# Patient Record
Sex: FEMALE | Race: WHITE | NOT HISPANIC OR LATINO | Employment: UNEMPLOYED | URBAN - METROPOLITAN AREA
[De-identification: names, ages, dates, MRNs, and addresses within clinical notes are randomized per-mention and may not be internally consistent; named-entity substitution may affect disease eponyms.]

---

## 2018-04-20 ENCOUNTER — TRANSCRIBE ORDERS (OUTPATIENT)
Dept: ADMINISTRATIVE | Facility: HOSPITAL | Age: 48
End: 2018-04-20

## 2018-04-20 DIAGNOSIS — J44.9 CHRONIC OBSTRUCTIVE PULMONARY DISEASE, UNSPECIFIED COPD TYPE (HCC): ICD-10-CM

## 2018-04-20 DIAGNOSIS — R07.9 CHEST PAIN, UNSPECIFIED TYPE: Primary | ICD-10-CM

## 2018-07-19 RX ORDER — FENOPROFEN CALCIUM 200 MG
CAPSULE ORAL EVERY 24 HOURS
COMMUNITY
Start: 2018-04-11 | End: 2018-12-06 | Stop reason: ALTCHOICE

## 2018-07-19 RX ORDER — FEXOFENADINE HCL 180 MG/1
TABLET ORAL EVERY 24 HOURS
COMMUNITY
Start: 2018-04-11 | End: 2018-12-06 | Stop reason: ALTCHOICE

## 2018-07-19 RX ORDER — IPRATROPIUM BROMIDE AND ALBUTEROL SULFATE 2.5; .5 MG/3ML; MG/3ML
3 SOLUTION RESPIRATORY (INHALATION) 4 TIMES DAILY
COMMUNITY
Start: 2017-08-24 | End: 2018-12-06 | Stop reason: ALTCHOICE

## 2018-07-19 RX ORDER — ALBUTEROL SULFATE 90 UG/1
1 AEROSOL, METERED RESPIRATORY (INHALATION) EVERY 6 HOURS
COMMUNITY
Start: 2017-08-24 | End: 2019-01-30 | Stop reason: SDUPTHER

## 2018-07-19 RX ORDER — LORATADINE 10 MG
TABLET ORAL
Refills: 0 | COMMUNITY
Start: 2018-04-11 | End: 2019-03-14 | Stop reason: ALTCHOICE

## 2018-07-19 RX ORDER — PAROXETINE 10 MG/1
TABLET, FILM COATED ORAL EVERY 24 HOURS
COMMUNITY
Start: 2018-04-20 | End: 2018-07-24 | Stop reason: SDUPTHER

## 2018-07-19 RX ORDER — NAPROXEN 500 MG/1
500 TABLET ORAL 2 TIMES DAILY WITH MEALS
Refills: 2 | COMMUNITY
Start: 2018-05-13 | End: 2018-07-24 | Stop reason: SDUPTHER

## 2018-07-19 RX ORDER — OMEPRAZOLE 40 MG/1
40 CAPSULE, DELAYED RELEASE ORAL DAILY
Refills: 5 | COMMUNITY
Start: 2018-05-13 | End: 2018-08-07 | Stop reason: SDUPTHER

## 2018-07-19 RX ORDER — LORAZEPAM 1 MG/1
1 TABLET ORAL EVERY 6 HOURS
COMMUNITY
Start: 2017-08-24 | End: 2018-07-24 | Stop reason: SDUPTHER

## 2018-07-23 DIAGNOSIS — Z72.0 TOBACCO ABUSE DISORDER: Primary | ICD-10-CM

## 2018-07-23 RX ORDER — NICOTINE 7 MG/24H
PATCH, EXTENDED RELEASE TRANSDERMAL
Qty: 28 PATCH | Refills: 0 | Status: SHIPPED | OUTPATIENT
Start: 2018-07-23 | End: 2020-03-13 | Stop reason: SDUPTHER

## 2018-07-23 RX ORDER — NICOTINE 14MG/24HR
PATCH, TRANSDERMAL 24 HOURS TRANSDERMAL
Qty: 28 PATCH | Refills: 0 | Status: SHIPPED | OUTPATIENT
Start: 2018-07-23 | End: 2019-01-10 | Stop reason: SDDI

## 2018-07-24 ENCOUNTER — OFFICE VISIT (OUTPATIENT)
Dept: FAMILY MEDICINE CLINIC | Facility: CLINIC | Age: 48
End: 2018-07-24
Payer: COMMERCIAL

## 2018-07-24 VITALS
RESPIRATION RATE: 16 BRPM | BODY MASS INDEX: 22.25 KG/M2 | TEMPERATURE: 97.6 F | SYSTOLIC BLOOD PRESSURE: 120 MMHG | DIASTOLIC BLOOD PRESSURE: 74 MMHG | WEIGHT: 106 LBS | OXYGEN SATURATION: 99 % | HEART RATE: 74 BPM | HEIGHT: 58 IN

## 2018-07-24 DIAGNOSIS — G89.29 CHRONIC BILATERAL LOW BACK PAIN WITHOUT SCIATICA: ICD-10-CM

## 2018-07-24 DIAGNOSIS — F17.200 TOBACCO DEPENDENCE SYNDROME: ICD-10-CM

## 2018-07-24 DIAGNOSIS — J44.9 COPD (CHRONIC OBSTRUCTIVE PULMONARY DISEASE) WITH CHRONIC BRONCHITIS (HCC): ICD-10-CM

## 2018-07-24 DIAGNOSIS — R53.83 FATIGUE, UNSPECIFIED TYPE: ICD-10-CM

## 2018-07-24 DIAGNOSIS — N95.1 PERIMENOPAUSAL: ICD-10-CM

## 2018-07-24 DIAGNOSIS — R58 ECCHYMOSIS: ICD-10-CM

## 2018-07-24 DIAGNOSIS — M54.50 CHRONIC BILATERAL LOW BACK PAIN WITHOUT SCIATICA: ICD-10-CM

## 2018-07-24 DIAGNOSIS — F41.9 ANXIETY: Primary | ICD-10-CM

## 2018-07-24 DIAGNOSIS — J45.40 UNCONTROLLED MODERATE PERSISTENT ASTHMA: ICD-10-CM

## 2018-07-24 PROBLEM — J45.909 ASTHMA: Status: ACTIVE | Noted: 2017-10-25

## 2018-07-24 PROBLEM — K44.9 GASTROESOPHAGEAL REFLUX DISEASE WITH HIATAL HERNIA: Status: ACTIVE | Noted: 2017-11-22

## 2018-07-24 PROBLEM — K21.9 GASTROESOPHAGEAL REFLUX DISEASE WITH HIATAL HERNIA: Status: ACTIVE | Noted: 2017-11-22

## 2018-07-24 LAB
APTT PPP: 28 SECONDS (ref 24–36)
BASOPHILS # BLD AUTO: 0.01 THOUSANDS/ΜL (ref 0–0.1)
BASOPHILS NFR BLD AUTO: 0 % (ref 0–1)
EOSINOPHIL # BLD AUTO: 0.04 THOUSAND/ΜL (ref 0–0.61)
EOSINOPHIL NFR BLD AUTO: 0 % (ref 0–6)
ERYTHROCYTE [DISTWIDTH] IN BLOOD BY AUTOMATED COUNT: 17.4 % (ref 11.6–15.1)
HCT VFR BLD AUTO: 37.4 % (ref 34.8–46.1)
HGB BLD-MCNC: 11.5 G/DL (ref 11.5–15.4)
IMM GRANULOCYTES # BLD AUTO: 0.04 THOUSAND/UL (ref 0–0.2)
IMM GRANULOCYTES NFR BLD AUTO: 0 % (ref 0–2)
LYMPHOCYTES # BLD AUTO: 1.15 THOUSANDS/ΜL (ref 0.6–4.47)
LYMPHOCYTES NFR BLD AUTO: 12 % (ref 14–44)
MCH RBC QN AUTO: 25.8 PG (ref 26.8–34.3)
MCHC RBC AUTO-ENTMCNC: 30.7 G/DL (ref 31.4–37.4)
MCV RBC AUTO: 84 FL (ref 82–98)
MONOCYTES # BLD AUTO: 0.97 THOUSAND/ΜL (ref 0.17–1.22)
MONOCYTES NFR BLD AUTO: 10 % (ref 4–12)
NEUTROPHILS # BLD AUTO: 7.16 THOUSANDS/ΜL (ref 1.85–7.62)
NEUTS SEG NFR BLD AUTO: 78 % (ref 43–75)
NRBC BLD AUTO-RTO: 0 /100 WBCS
PLATELET # BLD AUTO: 237 THOUSANDS/UL (ref 149–390)
PMV BLD AUTO: 11.7 FL (ref 8.9–12.7)
RBC # BLD AUTO: 4.46 MILLION/UL (ref 3.81–5.12)
TSH SERPL DL<=0.05 MIU/L-ACNC: 1.27 UIU/ML (ref 0.36–3.74)
WBC # BLD AUTO: 9.37 THOUSAND/UL (ref 4.31–10.16)

## 2018-07-24 PROCEDURE — 85730 THROMBOPLASTIN TIME PARTIAL: CPT | Performed by: FAMILY MEDICINE

## 2018-07-24 PROCEDURE — 36415 COLL VENOUS BLD VENIPUNCTURE: CPT | Performed by: FAMILY MEDICINE

## 2018-07-24 PROCEDURE — 99214 OFFICE O/P EST MOD 30 MIN: CPT | Performed by: FAMILY MEDICINE

## 2018-07-24 PROCEDURE — 85025 COMPLETE CBC W/AUTO DIFF WBC: CPT | Performed by: FAMILY MEDICINE

## 2018-07-24 PROCEDURE — 84443 ASSAY THYROID STIM HORMONE: CPT | Performed by: FAMILY MEDICINE

## 2018-07-24 PROCEDURE — 3008F BODY MASS INDEX DOCD: CPT | Performed by: FAMILY MEDICINE

## 2018-07-24 RX ORDER — LORAZEPAM 1 MG/1
1 TABLET ORAL EVERY 6 HOURS
Qty: 30 TABLET | Refills: 0 | Status: CANCELLED | OUTPATIENT
Start: 2018-07-24

## 2018-07-24 RX ORDER — LORAZEPAM 1 MG/1
1 TABLET ORAL DAILY PRN
Qty: 30 TABLET | Refills: 0 | Status: SHIPPED | OUTPATIENT
Start: 2018-07-24 | End: 2018-12-06 | Stop reason: ALTCHOICE

## 2018-07-24 RX ORDER — NAPROXEN 500 MG/1
500 TABLET ORAL 2 TIMES DAILY WITH MEALS
Qty: 60 TABLET | Refills: 1 | Status: SHIPPED | OUTPATIENT
Start: 2018-07-24 | End: 2018-10-22 | Stop reason: SDUPTHER

## 2018-07-24 RX ORDER — PAROXETINE HYDROCHLORIDE 20 MG/1
20 TABLET, FILM COATED ORAL DAILY
Qty: 30 TABLET | Refills: 3 | Status: SHIPPED | OUTPATIENT
Start: 2018-07-24 | End: 2019-02-05

## 2018-07-24 NOTE — PROGRESS NOTES
Assessment/Plan:    No problem-specific Assessment & Plan notes found for this encounter  Plan:  1  Patient was referred to pulmonologist   2  We will increase her Paxil to 20 mg daily  This should help with some recent depression and also her underlying anxiety condition  She will also use Ativan 1 mg as needed when she has significant anxiety or panic symptoms  She is aware that this medication may cause dependency  3  We discussed the importance of smoking cessation and she is going to continue to try to quit  4  In reference to her menopausal symptoms I would like her to see our gynecologist for continued care and management  5  Her CT scan of the abdomen and pelvis was essentially unremarkable except for some old healed left rib fractures and mild diverticulosis of her sigmoid colon  Appropriate diet was discussed which included higher fiber  And to avoid seeds and nuts  Most likely her symptoms are related to irritable bowel syndrome which probably would improve with a higher dose of Paxil and better control of her depression and anxiety  If her symptoms become worse it would be prudent to refer her to a gastroenterology specialist   6  She also has experienced significant almost daily headaches which are described more as a tension-type headache  She was advised on various tips including the use of a massager, heat, and over-the-counter salonpas patches  She may also continue to use naproxen 500 mg twice daily, but she is to monitor for symptoms of stomach irritation  7  I will see her back in 4 weeks to see how some of the items that we discussed today are helping her and working for her  [place] drug [place] was reviewed  Diagnoses and all orders for this visit:    Anxiety  -     LORazepam (ATIVAN) 1 mg tablet; Take 1 tablet (1 mg total) by mouth every 6 (six) hours    Other orders  -     albuterol (PROVENTIL HFA,VENTOLIN HFA) 90 mcg/act inhaler;  Inhale 1 puff every 6 (six) hours  -     mometasone-formoterol (DULERA) 200-5 MCG/ACT inhaler; Every 12 hours  -     fexofenadine (ALLEGRA) 180 MG tablet; every 24 hours  -     fluticasone-salmeterol (ADVAIR DISKUS) 100-50 mcg/dose inhaler; Inhale 1 puff  -     MUCUS RELIEF 400 MG; TAKE 1 TABLET BY ORAL ROUTE EVERY 4 HOURS AS NEEDED  -     hydrocortisone 1 % lotion; every 24 hours  -     ipratropium-albuterol (DUO-NEB) 0 5-2 5 mg/3 mL nebulizer solution; Inhale 3 mL 4 (four) times a day  -     LORazepam (ATIVAN) 1 mg tablet; Take 1 mg by mouth every 6 (six) hours  -     naproxen (NAPROSYN) 500 mg tablet; Take 500 mg by mouth 2 (two) times a day with meals  -     omeprazole (PriLOSEC) 40 MG capsule; Take 40 mg by mouth daily  -     PARoxetine (PAXIL) 10 mg tablet; every 24 hours          Subjective:      Patient ID: Maite Sweeney is a 53 y o  female  53 yof complains of cough with SOB productive of clear mucus x 1 month  This improves with inhalers and nebulizers which she uses three times a day  Reports she has hot and cold sweats  Reports irregular periods  Believes she is going through menopause  Reports tension headaches in the evening, is not aware of any exacerbating or alleviating factors other than food  She was also recently scratched by a cat 2 weeks ago  Denies warmth or drainage  Has been washing it regularly and is using bacitracin  Also reports she has been bruising more easily lately  Also reports upper back pain that is mildly alleviated by naproxen  The following portions of the patient's history were reviewed and updated as appropriate: allergies, current medications, past family history, past medical history, past social history, past surgical history and problem list     Review of Systems   Constitutional: Positive for fatigue  Negative for appetite change and unexpected weight change  HENT: Positive for congestion, rhinorrhea and sinus pressure      Eyes: Negative for discharge and visual disturbance  Reports worsening vision and has never seen an opthomologist   Respiratory: Positive for cough, chest tightness, shortness of breath and wheezing  Cardiovascular: Negative for chest pain, palpitations and leg swelling  Gastrointestinal: Negative for abdominal pain, diarrhea, nausea and vomiting  Genitourinary: Negative for frequency  Neurological: Positive for headaches  Negative for dizziness and light-headedness  Hematological: Bruises/bleeds easily  Psychiatric/Behavioral: Positive for agitation  Negative for decreased concentration  The patient is nervous/anxious  Objective:      /74 (BP Location: Left arm, Patient Position: Sitting, Cuff Size: Standard)   Pulse 74   Temp 97 6 °F (36 4 °C) (Tympanic)   Resp 16   Ht 4' 10" (1 473 m)   Wt 48 1 kg (106 lb)   SpO2 99%   BMI 22 15 kg/m²          Physical Exam   Constitutional: She appears well-developed and well-nourished  She is cooperative  No distress  HENT:   Head: Normocephalic and atraumatic  Eyes: Conjunctivae are normal  No scleral icterus  Neck: Normal range of motion  Neck supple  No thyromegaly present  Cardiovascular: Normal rate, regular rhythm, normal heart sounds and intact distal pulses  No murmur heard  Pulmonary/Chest: Effort normal and breath sounds normal  No respiratory distress  She has no wheezes  She has no rales  Musculoskeletal: She exhibits no edema  Neurological: She is alert  Skin: She is not diaphoretic  Psychiatric: She has a normal mood and affect   Her behavior is normal  Judgment and thought content normal

## 2018-08-07 DIAGNOSIS — K21.00 GASTROESOPHAGEAL REFLUX DISEASE WITH ESOPHAGITIS: Primary | ICD-10-CM

## 2018-08-07 RX ORDER — OMEPRAZOLE 40 MG/1
CAPSULE, DELAYED RELEASE ORAL
Qty: 30 CAPSULE | Refills: 5 | Status: SHIPPED | OUTPATIENT
Start: 2018-08-07 | End: 2019-01-30 | Stop reason: SDUPTHER

## 2018-10-11 DIAGNOSIS — F41.8 DEPRESSION WITH ANXIETY: Primary | ICD-10-CM

## 2018-10-11 NOTE — TELEPHONE ENCOUNTER
Medication list shows Paxil 20 mg daily  Refill request from pharmacy is for Paxil 10 mg daily  Please call patient and pharmacy to verify which dose she is taking  Please pen and the medication order with 30 tablets and 1 refill an established patient with new PCP for November 2018

## 2018-10-22 DIAGNOSIS — M54.50 CHRONIC BILATERAL LOW BACK PAIN WITHOUT SCIATICA: ICD-10-CM

## 2018-10-22 DIAGNOSIS — G89.29 CHRONIC BILATERAL LOW BACK PAIN WITHOUT SCIATICA: ICD-10-CM

## 2018-10-22 RX ORDER — NAPROXEN 500 MG/1
TABLET ORAL
Qty: 60 TABLET | Refills: 1 | Status: SHIPPED | OUTPATIENT
Start: 2018-10-22 | End: 2018-12-16 | Stop reason: SDUPTHER

## 2018-12-06 ENCOUNTER — OFFICE VISIT (OUTPATIENT)
Dept: FAMILY MEDICINE CLINIC | Facility: CLINIC | Age: 48
End: 2018-12-06
Payer: COMMERCIAL

## 2018-12-06 VITALS
SYSTOLIC BLOOD PRESSURE: 152 MMHG | HEART RATE: 98 BPM | DIASTOLIC BLOOD PRESSURE: 84 MMHG | BODY MASS INDEX: 22.25 KG/M2 | WEIGHT: 106 LBS | OXYGEN SATURATION: 95 % | HEIGHT: 58 IN

## 2018-12-06 DIAGNOSIS — F17.200 TOBACCO DEPENDENCE SYNDROME: ICD-10-CM

## 2018-12-06 DIAGNOSIS — J44.1 CHRONIC OBSTRUCTIVE PULMONARY DISEASE WITH ACUTE EXACERBATION (HCC): Primary | ICD-10-CM

## 2018-12-06 DIAGNOSIS — F41.9 ANXIETY DISORDER, UNSPECIFIED TYPE: ICD-10-CM

## 2018-12-06 PROCEDURE — 99214 OFFICE O/P EST MOD 30 MIN: CPT | Performed by: INTERNAL MEDICINE

## 2018-12-06 PROCEDURE — 3008F BODY MASS INDEX DOCD: CPT | Performed by: INTERNAL MEDICINE

## 2018-12-06 RX ORDER — AMOXICILLIN AND CLAVULANATE POTASSIUM 875; 125 MG/1; MG/1
1 TABLET, FILM COATED ORAL EVERY 12 HOURS SCHEDULED
Qty: 14 TABLET | Refills: 0 | Status: SHIPPED | OUTPATIENT
Start: 2018-12-06 | End: 2018-12-13

## 2018-12-06 RX ORDER — PREDNISONE 20 MG/1
TABLET ORAL
Qty: 8 TABLET | Refills: 0 | Status: SHIPPED | OUTPATIENT
Start: 2018-12-06 | End: 2019-01-08 | Stop reason: SDUPTHER

## 2018-12-06 NOTE — PROGRESS NOTES
Assessment/Plan:    No problem-specific Assessment & Plan notes found for this encounter  Diagnoses and all orders for this visit:    Chronic obstructive pulmonary disease with acute exacerbation ([facility] [address] )  -     Ambulatory referral to Pulmonology; Future  -     predniSONE 20 mg tablet; 20 mg 1 p o  Q day then 10 mg 1 p  O  Q day then 5 mg 1 p o  Q day   -     amoxicillin-clavulanate (AUGMENTIN) 875-125 mg per tablet; Take 1 tablet by mouth every 12 twelve) hours for 7 days   patient will follow up on Monday for close monitoring  Anxiety disorder, unspecified type  -     Ambulatory referral to Psychiatry; Future   continue with packed  Tobacco dependence syndrome    Other orders  -     Discontinue: OMEPRAZOLE PO; TAKE 1 CAPSULE BY ORAL ROUTE EVERY DAY        Subjective:      Patient ID: Hernesto Tom is a 53 y o  female  This is a 16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 who is here with an upper respiratory infection going on for last several days with increased mucus production shortness of breath  She has a diagnosis of COPD for last several years  She smokes since she was 16years of age to 3 packs a day though has cut back to couple of cigarettes today she stays now  She is diagnosed with COPD  Denies any fevers and chills  She did take her nebulizer treatment this morning  She has poor exercise tolerance  Dr Rebecca Landa had referred her for a pulmonologist but she did not follow up  She was started on Paxil last visit  She has not seen any significant improvement and does suffer from anxiety  We discussed the cycle of anxiety and continues use of tobacco   Psychiatry referral was made today  She is also encouraged to follow up with pulmonologist as well  She has a process of getting a welfare health  She has a 15year-old son at home        She is accompanied today by Gaye Lynne clinical coordinator    The following portions of the patient's history were reviewed and updated as appropriate: allergies, current medications, past family history, past medical history, past social history, past surgical history and problem list     Review of Systems   Constitutional: Negative for chills and fever  HENT: Negative for trouble swallowing  Respiratory: Positive for cough and shortness of breath  Cardiovascular: Negative for chest pain and leg swelling  Gastrointestinal: Positive for constipation and diarrhea  Abdominal pain: Chronic ongoing abdominal pain for which she sees Dr Ilana High  Genitourinary: Negative for dysuria  Neurological: Negative for dizziness  Psychiatric/Behavioral: Positive for dysphoric mood  The patient is nervous/anxious  Objective:      /84 (BP Location: Right leg, Patient Position: Sitting, Cuff Size: Standard)   Pulse 98   Ht 4' 10" (1 473 m)   Wt 48 1 kg (106 lb)   SpO2 95%   BMI 22 15 kg/m²          Physical Exam   Constitutional: She is oriented to person, place, and time  HENT:   Mouth/Throat: No oropharyngeal exudate  Throat erythema   Eyes: Conjunctivae are normal  No scleral icterus  Neck: No thyromegaly present  Cardiovascular: Normal rate, regular rhythm and normal heart sounds  No murmur heard  Pulmonary/Chest: Effort normal  No respiratory distress  She has wheezes  She has no rales  Smells of tobacco   Abdominal: Soft  Bowel sounds are normal  She exhibits no distension  There is no tenderness  There is no rebound  Musculoskeletal: She exhibits no edema  Lymphadenopathy:     She has no cervical adenopathy  Neurological: She is alert and oriented to person, place, and time     Psychiatric:   Tearful talking about her son and social situation

## 2018-12-10 ENCOUNTER — OFFICE VISIT (OUTPATIENT)
Dept: FAMILY MEDICINE CLINIC | Facility: CLINIC | Age: 48
End: 2018-12-10
Payer: COMMERCIAL

## 2018-12-10 VITALS
SYSTOLIC BLOOD PRESSURE: 120 MMHG | OXYGEN SATURATION: 99 % | HEART RATE: 87 BPM | BODY MASS INDEX: 22.67 KG/M2 | DIASTOLIC BLOOD PRESSURE: 68 MMHG | HEIGHT: 58 IN | WEIGHT: 108 LBS

## 2018-12-10 DIAGNOSIS — Z13.220 SCREENING FOR LIPID DISORDERS: ICD-10-CM

## 2018-12-10 DIAGNOSIS — Z01.419 ENCOUNTER FOR GYNECOLOGICAL EXAMINATION: ICD-10-CM

## 2018-12-10 DIAGNOSIS — F41.9 ANXIETY: ICD-10-CM

## 2018-12-10 DIAGNOSIS — J41.0 SIMPLE CHRONIC BRONCHITIS (HCC): ICD-10-CM

## 2018-12-10 DIAGNOSIS — Z13.21 ENCOUNTER FOR VITAMIN DEFICIENCY SCREENING: ICD-10-CM

## 2018-12-10 DIAGNOSIS — Z13.29 SCREENING FOR THYROID DISORDER: ICD-10-CM

## 2018-12-10 DIAGNOSIS — J45.50 SEVERE PERSISTENT ASTHMA WITHOUT COMPLICATION: Primary | ICD-10-CM

## 2018-12-10 PROCEDURE — 3008F BODY MASS INDEX DOCD: CPT | Performed by: INTERNAL MEDICINE

## 2018-12-10 PROCEDURE — 99213 OFFICE O/P EST LOW 20 MIN: CPT | Performed by: INTERNAL MEDICINE

## 2018-12-10 NOTE — PROGRESS NOTES
Assessment/Plan:         Diagnoses and all orders for this visit:    Severe persistent asthma without complication  -     CBC and differential  -     Comprehensive metabolic panel    Continue current regimen  Screening for thyroid disorder  -     TSH, 3rd generation with Free T4 reflex    Screening for lipid disorders  -     Lipid panel    Encounter for vitamin deficiency screening  -     Vitamin D 25 hydroxy    copd    Continue current regimen pulmonary consult  Encounter for gynecological examination  -     Ambulatory referral to Gynecology; Future        Subjective:      Patient ID: Leia Stephens is a 53 y o  female  Patient is here to follow-up on recent episode upper respiratory infection  As started on Augmentin she has taken for last 3 days and root reports remarkable improvement of her symptoms  She reports that she has not smoke since this morning  She has seen an improvement already with less dyspnea and cough  No fevers and chills  Denies any diarrhea  She will be calling for the pulmonologist appointment  The following portions of the patient's history were reviewed and updated as appropriate: allergies, current medications, past family history, past medical history, past social history, past surgical history and problem list     Review of Systems   Respiratory:        As above         Objective:      /68 (BP Location: Left arm, Patient Position: Sitting, Cuff Size: Standard)   Pulse 87   Ht 4' 10" (1 473 m)   Wt 49 kg (108 lb)   SpO2 99%   BMI 22 57 kg/m²          Physical Exam   Constitutional: She is oriented to person, place, and time  Minimal congestion and hoarseness   Cardiovascular: Normal rate, regular rhythm and normal heart sounds  No murmur heard  Pulmonary/Chest: No respiratory distress  She has no wheezes  She has no rales  Decreased breath sounds bilaterally no wheeze or rhonchi   Musculoskeletal: She exhibits no edema     Neurological: She is alert and oriented to person, place, and time     Psychiatric:    Seems calm  And composed

## 2018-12-13 RX ORDER — PAROXETINE 10 MG/1
TABLET, FILM COATED ORAL
Qty: 30 TABLET | Refills: 1 | Status: SHIPPED | OUTPATIENT
Start: 2018-12-13 | End: 2019-02-05

## 2018-12-16 DIAGNOSIS — G89.29 CHRONIC BILATERAL LOW BACK PAIN WITHOUT SCIATICA: ICD-10-CM

## 2018-12-16 DIAGNOSIS — M54.50 CHRONIC BILATERAL LOW BACK PAIN WITHOUT SCIATICA: ICD-10-CM

## 2018-12-17 RX ORDER — NAPROXEN 500 MG/1
TABLET ORAL
Qty: 60 TABLET | Refills: 1 | Status: SHIPPED | OUTPATIENT
Start: 2018-12-17 | End: 2019-02-18 | Stop reason: SDUPTHER

## 2019-01-08 ENCOUNTER — APPOINTMENT (OUTPATIENT)
Dept: LAB | Facility: MEDICAL CENTER | Age: 49
End: 2019-01-08
Payer: COMMERCIAL

## 2019-01-08 DIAGNOSIS — R58 ECCHYMOSIS: ICD-10-CM

## 2019-01-08 DIAGNOSIS — R53.83 FATIGUE, UNSPECIFIED TYPE: ICD-10-CM

## 2019-01-08 LAB
25(OH)D3 SERPL-MCNC: 27.1 NG/ML (ref 30–100)
ALBUMIN SERPL BCP-MCNC: 3.6 G/DL (ref 3.5–5)
ALP SERPL-CCNC: 146 U/L (ref 46–116)
ALT SERPL W P-5'-P-CCNC: 89 U/L (ref 12–78)
ANION GAP SERPL CALCULATED.3IONS-SCNC: 6 MMOL/L (ref 4–13)
AST SERPL W P-5'-P-CCNC: 121 U/L (ref 5–45)
BASOPHILS # BLD AUTO: 0.03 THOUSANDS/ΜL (ref 0–0.1)
BASOPHILS NFR BLD AUTO: 1 % (ref 0–1)
BILIRUB SERPL-MCNC: 0.29 MG/DL (ref 0.2–1)
BUN SERPL-MCNC: 8 MG/DL (ref 5–25)
CALCIUM SERPL-MCNC: 9 MG/DL (ref 8.3–10.1)
CHLORIDE SERPL-SCNC: 101 MMOL/L (ref 100–108)
CHOLEST SERPL-MCNC: 245 MG/DL (ref 50–200)
CO2 SERPL-SCNC: 27 MMOL/L (ref 21–32)
CREAT SERPL-MCNC: 0.53 MG/DL (ref 0.6–1.3)
EOSINOPHIL # BLD AUTO: 0.14 THOUSAND/ΜL (ref 0–0.61)
EOSINOPHIL NFR BLD AUTO: 3 % (ref 0–6)
ERYTHROCYTE [DISTWIDTH] IN BLOOD BY AUTOMATED COUNT: 17.8 % (ref 11.6–15.1)
GFR SERPL CREATININE-BSD FRML MDRD: 113 ML/MIN/1.73SQ M
GLUCOSE P FAST SERPL-MCNC: 72 MG/DL (ref 65–99)
HCT VFR BLD AUTO: 43.4 % (ref 34.8–46.1)
HDLC SERPL-MCNC: 96 MG/DL (ref 40–60)
HGB BLD-MCNC: 14 G/DL (ref 11.5–15.4)
IMM GRANULOCYTES # BLD AUTO: 0.02 THOUSAND/UL (ref 0–0.2)
IMM GRANULOCYTES NFR BLD AUTO: 0 % (ref 0–2)
INR PPP: 1.16 (ref 0.86–1.17)
LDLC SERPL CALC-MCNC: 104 MG/DL (ref 0–100)
LYMPHOCYTES # BLD AUTO: 0.77 THOUSANDS/ΜL (ref 0.6–4.47)
LYMPHOCYTES NFR BLD AUTO: 17 % (ref 14–44)
MCH RBC QN AUTO: 28.6 PG (ref 26.8–34.3)
MCHC RBC AUTO-ENTMCNC: 32.3 G/DL (ref 31.4–37.4)
MCV RBC AUTO: 89 FL (ref 82–98)
MONOCYTES # BLD AUTO: 0.72 THOUSAND/ΜL (ref 0.17–1.22)
MONOCYTES NFR BLD AUTO: 16 % (ref 4–12)
NEUTROPHILS # BLD AUTO: 2.79 THOUSANDS/ΜL (ref 1.85–7.62)
NEUTS SEG NFR BLD AUTO: 63 % (ref 43–75)
NONHDLC SERPL-MCNC: 149 MG/DL
NRBC BLD AUTO-RTO: 0 /100 WBCS
PLATELET # BLD AUTO: 225 THOUSANDS/UL (ref 149–390)
PMV BLD AUTO: 11.8 FL (ref 8.9–12.7)
POTASSIUM SERPL-SCNC: 3.8 MMOL/L (ref 3.5–5.3)
PROT SERPL-MCNC: 8.9 G/DL (ref 6.4–8.2)
PROTHROMBIN TIME: 14.9 SECONDS (ref 11.8–14.2)
RBC # BLD AUTO: 4.89 MILLION/UL (ref 3.81–5.12)
SODIUM SERPL-SCNC: 134 MMOL/L (ref 136–145)
TRIGL SERPL-MCNC: 223 MG/DL
TSH SERPL DL<=0.05 MIU/L-ACNC: 3.21 UIU/ML (ref 0.36–3.74)
WBC # BLD AUTO: 4.47 THOUSAND/UL (ref 4.31–10.16)

## 2019-01-08 PROCEDURE — 82306 VITAMIN D 25 HYDROXY: CPT | Performed by: INTERNAL MEDICINE

## 2019-01-08 PROCEDURE — 80061 LIPID PANEL: CPT | Performed by: INTERNAL MEDICINE

## 2019-01-08 PROCEDURE — 84443 ASSAY THYROID STIM HORMONE: CPT | Performed by: INTERNAL MEDICINE

## 2019-01-08 PROCEDURE — 80053 COMPREHEN METABOLIC PANEL: CPT

## 2019-01-08 PROCEDURE — 36415 COLL VENOUS BLD VENIPUNCTURE: CPT

## 2019-01-08 PROCEDURE — 85610 PROTHROMBIN TIME: CPT

## 2019-01-08 PROCEDURE — 85025 COMPLETE CBC W/AUTO DIFF WBC: CPT | Performed by: INTERNAL MEDICINE

## 2019-01-10 ENCOUNTER — OFFICE VISIT (OUTPATIENT)
Dept: PULMONOLOGY | Facility: CLINIC | Age: 49
End: 2019-01-10
Payer: COMMERCIAL

## 2019-01-10 ENCOUNTER — APPOINTMENT (OUTPATIENT)
Dept: RADIOLOGY | Facility: MEDICAL CENTER | Age: 49
End: 2019-01-10
Payer: COMMERCIAL

## 2019-01-10 VITALS
RESPIRATION RATE: 22 BRPM | HEART RATE: 94 BPM | BODY MASS INDEX: 20.96 KG/M2 | HEIGHT: 59 IN | TEMPERATURE: 98.5 F | WEIGHT: 104 LBS | OXYGEN SATURATION: 95 % | SYSTOLIC BLOOD PRESSURE: 154 MMHG | DIASTOLIC BLOOD PRESSURE: 84 MMHG

## 2019-01-10 DIAGNOSIS — J44.9 COPD (CHRONIC OBSTRUCTIVE PULMONARY DISEASE) (HCC): ICD-10-CM

## 2019-01-10 DIAGNOSIS — J30.9 ALLERGIC RHINITIS: ICD-10-CM

## 2019-01-10 DIAGNOSIS — J44.1 CHRONIC OBSTRUCTIVE PULMONARY DISEASE WITH ACUTE EXACERBATION (HCC): ICD-10-CM

## 2019-01-10 DIAGNOSIS — R06.00 DOE (DYSPNEA ON EXERTION): Primary | ICD-10-CM

## 2019-01-10 PROCEDURE — 99245 OFF/OP CONSLTJ NEW/EST HI 55: CPT | Performed by: INTERNAL MEDICINE

## 2019-01-10 PROCEDURE — 71046 X-RAY EXAM CHEST 2 VIEWS: CPT

## 2019-01-10 PROCEDURE — 94010 BREATHING CAPACITY TEST: CPT | Performed by: INTERNAL MEDICINE

## 2019-01-10 PROCEDURE — 94618 PULMONARY STRESS TESTING: CPT | Performed by: INTERNAL MEDICINE

## 2019-01-10 RX ORDER — FEXOFENADINE HCL 180 MG/1
180 TABLET ORAL DAILY
Qty: 30 TABLET | Refills: 5 | Status: SHIPPED | OUTPATIENT
Start: 2019-01-10 | End: 2019-02-05

## 2019-01-10 RX ORDER — FLUTICASONE PROPIONATE 50 MCG
1 SPRAY, SUSPENSION (ML) NASAL DAILY
Qty: 1 BOTTLE | Refills: 5 | Status: SHIPPED | OUTPATIENT
Start: 2019-01-10 | End: 2019-08-04 | Stop reason: SDUPTHER

## 2019-01-10 NOTE — PROGRESS NOTES
Pulmonary Consultation   Silvia Smallwood 53 y o  female MRN: [id number]    Encounter: [id number]      Reason for consultation:   Dyspnea and bronchitis  Requesting physician:  Cher Ramírez      Impressions:   · Severe chronic obstructive pulmonary disease  · Dyspnea on exertion  · Allergic rhinitis  Recommendations:  · Spirometry  · 6 min walk test   · Chest x-ray PA and lateral   · Trelegy one inhalation once a day  · Discontinued Dulera  · Albuterol as needed  · Fexofenadine 180 mg once a day  · Fluticasone nasal spray 2 sprays to each nostril once a day  · Discontinue prednisone  · Discontinue doxycycline  · Follow-up in 2 months  Discussion:  The patient has severe COPD  It is the cause of her dyspnea on exertion  Fortunately on the 6 min walk test she did not desaturate  I have discontinue the [facility]  I have started her on Trelegy 1 inhalation once a day  I have discontinue the [facility]  Also advised her to start taking the prednisone and doxycycline  I have maintained her on the albuterol as needed  Also she has significant allergic rhinitis  I have started her on fexofenadine 180 mg once a day and fluticasone nasal spray 2 sprays to each nostril once a day  I had a long discussion with her regarding her home environment  Seems that there is mold in the house as well as her significant other smoking in the trailer  She is in a very difficult social situation  Of will discuss this further during the next visit  I have recommended to do the chest x-ray PA and lateral that was ordered by Magdalena Gonzalez  Also I will discuss with her next visit the need for lung cancer screening  I will see her in 2 months in a follow-up visit  History of Present Illness   HPI:  Silvia Smallwood is a 53 y o  female who is here for evaluation of dyspnea and bronchitis  The patient has been having recurrent symptoms of cough and wheezing    The patient stated that she gets sudden onset of tickling in her throat and then she will have an episode of severe cough followed by wheezing and chest tightness  Also she has been having shortness of breath on exertion  These episodes happen mainly in the trailer that she lives in  The trailer is old and has mold in it  His significant other who lives with her smokes in the trailer  She has about 40 pack year smoking history  Occasionally she wakes up at night with coughing episodes and shortness of Breath  She was recently at the [facility] and was prescribed prednisone and doxycycline  She was seen by Dr Kevin Call :  Who renewed her prednisone  Before the latest episode she will have these episodes once or twice a week  Review of systems:  12 point review of systems was completed and was otherwise negative except as listed in HPI  Historical Information   Past Medical History:   Diagnosis Date    Anxiety     Depression      Past Surgical History:   Procedure Laterality Date    BUNIONECTOMY Left      Family History   Problem Relation Age of Onset    Diabetes Mother    Wash Caller COPD Mother     Lung cancer Mother     Lung cancer Father        Family History:  Noncontributory  Social History:  The patient lives with her significant other  They live in a trailer which is very old and has mold in it  Also her son lives with her  She has no pets  She has more than 40 pack year smoking history and quit a month ago  Meds/Allergies   No current facility-administered medications for this visit  (Not in a hospital admission)  Allergies   Allergen Reactions    No Active Allergies        Vitals: Blood pressure 154/84, pulse 94, temperature 98 5 °F (36 9 °C), resp  rate 22, height 4' 10 5" (1 486 m), weight 47 2 kg (104 lb), SpO2 95 %  ,      Physical exam:        Head/eyes:    Normocephalic, without obvious abnormality, atraumatic,         PERRL, extraocular muscles intact, no scleral icterus    Nose:   Nares normal, septum midline, mucosa normal, no drainage    or sinus tenderness   Throat:   Moist mucous membranes, no thrush   Neck:   Supple, trachea midline, no adenopathy; no carotid    bruit or JVD   Lungs:     Decreased breath sounds  No wheezing or rhonchi  Heart:    Regular rate and rhythm, S1 and S2 normal, no murmur, rub   or gallop   Abdomen:     Soft, non-tender, bowel sounds active all four quadrants,     no masses, no organomegaly   Extremities:   Extremities normal, atraumatic, no cyanosis or edema   Skin:   Warm, dry, turgor normal, no rashes or lesions   Neurologic:   CNII-XII intact, normal strength, non-focal         6 min walk test was done today and she was able to do only 4 min because she was out of breath  She has maintained her O2 saturation between 90-93 %  She walked 252 m  Spirometry done today in the office and it showed severe airflow limitation           Jas Conley MD

## 2019-01-15 ENCOUNTER — TELEPHONE (OUTPATIENT)
Dept: PULMONOLOGY | Facility: CLINIC | Age: 49
End: 2019-01-15

## 2019-01-15 NOTE — TELEPHONE ENCOUNTER
Pt is calling requesting for trellegy and fexofenadine to get pre authorized  Call her if you have any questions

## 2019-01-15 NOTE — TELEPHONE ENCOUNTER
Called the patient and explained the steps taken and that she will have to purchase the allegra as it is not covered and they do sell the generic over the counter  The insurance will not cover over the counter medication and the pharmacist also explained to patient

## 2019-01-15 NOTE — TELEPHONE ENCOUNTER
Called [facility] and was informed that the trelegy ellipta needed prior auth and that the [employer] is not covered by insurance because it is a over the counter medication      Submitted prior auth via [facility] [id number]

## 2019-01-21 DIAGNOSIS — J45.909 UNCOMPLICATED ASTHMA, UNSPECIFIED ASTHMA SEVERITY, UNSPECIFIED WHETHER PERSISTENT: Primary | ICD-10-CM

## 2019-01-21 DIAGNOSIS — J45.40 UNCONTROLLED MODERATE PERSISTENT ASTHMA: ICD-10-CM

## 2019-01-21 NOTE — TELEPHONE ENCOUNTER
Meghann Weber call for an update on the prior auth  Explained I am working on it  Patient is low so I am giving 2 samples    Spoke with Meghann Weber and explained that the insurance will not cover the Trelegy due to not being formulary and I explained that I am waiting on a reply as to what is to be done   I will call her tomorrow with the answer as she could not get in today    Spoke to Ana Paula and we are going to give 2 samples and left a message to call me in a week to see how she is doing

## 2019-01-21 NOTE — TELEPHONE ENCOUNTER
Received fax that the insurance will not cover the Trelegy because it is not a formulary drug   Patient will have to try Edgar smart

## 2019-01-22 DIAGNOSIS — J45.20 MILD INTERMITTENT ASTHMA WITHOUT COMPLICATION: Primary | ICD-10-CM

## 2019-01-22 RX ORDER — FLUTICASONE FUROATE AND VILANTEROL 200; 25 UG/1; UG/1
1 POWDER RESPIRATORY (INHALATION) DAILY
Qty: 2 INHALER | Refills: 0 | Status: SHIPPED | COMMUNITY
Start: 2019-01-22 | End: 2019-01-25 | Stop reason: CLARIF

## 2019-01-24 NOTE — TELEPHONE ENCOUNTER
Kori Sanchez called and stated that the patient had already tried Breo in the past and it did not work  She had gone to the pharmacy and got a list of the medication that the patient tried in the past     Called the insurance and spoke with Ml Morales who transferred me to [facility] and he is approving the trelegy Rachna Calender as the patient already tried the [employer] and left a message to let Kori Sanchez know the status    Received approval for the Trelegy quantity of 60 for 30 days for 12 months from 1/24/19-1/24/2020      Called pharmacy to please expidite the prescription due to the patient not having any at this time

## 2019-01-25 DIAGNOSIS — J45.40 UNCONTROLLED MODERATE PERSISTENT ASTHMA: ICD-10-CM

## 2019-01-25 NOTE — TELEPHONE ENCOUNTER
Submitting sample of  trelegy in the amount of2 given to Jessica Horner at the [facility] office per Becca O'Lakes

## 2019-01-25 NOTE — ADDENDUM NOTE
Addended by: Khurram Ruiz on: 1/25/2019 01:29 PM     Modules accepted: Orders Encounter addended by: Elizabeth L. Sawallisch on: 10/10/2017 11:07 AM<BR>    Actions taken: Sign clinical note

## 2019-01-30 DIAGNOSIS — J40 BRONCHITIS: Primary | ICD-10-CM

## 2019-01-30 DIAGNOSIS — K21.00 GASTROESOPHAGEAL REFLUX DISEASE WITH ESOPHAGITIS: ICD-10-CM

## 2019-01-30 RX ORDER — OMEPRAZOLE 40 MG/1
CAPSULE, DELAYED RELEASE ORAL
Qty: 30 CAPSULE | Refills: 5 | Status: SHIPPED | OUTPATIENT
Start: 2019-01-30 | End: 2019-07-23 | Stop reason: SDUPTHER

## 2019-01-30 RX ORDER — ALBUTEROL SULFATE 90 UG/1
AEROSOL, METERED RESPIRATORY (INHALATION)
Qty: 18 INHALER | Refills: 0 | Status: SHIPPED | OUTPATIENT
Start: 2019-01-30 | End: 2019-01-31 | Stop reason: SDUPTHER

## 2019-01-31 ENCOUNTER — OFFICE VISIT (OUTPATIENT)
Dept: FAMILY MEDICINE CLINIC | Facility: CLINIC | Age: 49
End: 2019-01-31
Payer: COMMERCIAL

## 2019-01-31 ENCOUNTER — HOSPITAL ENCOUNTER (OUTPATIENT)
Dept: ULTRASOUND IMAGING | Facility: HOSPITAL | Age: 49
Discharge: HOME | End: 2019-01-31
Payer: COMMERCIAL

## 2019-01-31 VITALS
DIASTOLIC BLOOD PRESSURE: 90 MMHG | WEIGHT: 104 LBS | HEIGHT: 59 IN | TEMPERATURE: 98.6 F | BODY MASS INDEX: 20.96 KG/M2 | HEART RATE: 97 BPM | SYSTOLIC BLOOD PRESSURE: 142 MMHG | OXYGEN SATURATION: 98 %

## 2019-01-31 DIAGNOSIS — Z72.89 ALCOHOL USE: ICD-10-CM

## 2019-01-31 DIAGNOSIS — R74.8 ELEVATED LIVER ENZYMES: ICD-10-CM

## 2019-01-31 DIAGNOSIS — R74.8 ELEVATED LIVER ENZYMES: Primary | ICD-10-CM

## 2019-01-31 DIAGNOSIS — R21 RASH: ICD-10-CM

## 2019-01-31 DIAGNOSIS — J40 BRONCHITIS: ICD-10-CM

## 2019-01-31 DIAGNOSIS — I10 ESSENTIAL HYPERTENSION: ICD-10-CM

## 2019-01-31 PROBLEM — F41.9 ANXIETY: Status: RESOLVED | Noted: 2017-10-25 | Resolved: 2019-01-31

## 2019-01-31 LAB
ALBUMIN SERPL BCP-MCNC: 4.1 G/DL (ref 3.5–5)
ALP SERPL-CCNC: 117 U/L (ref 46–116)
ALT SERPL W P-5'-P-CCNC: 61 U/L (ref 12–78)
ANION GAP SERPL CALCULATED.3IONS-SCNC: 10 MMOL/L (ref 4–13)
AST SERPL W P-5'-P-CCNC: 76 U/L (ref 5–45)
BILIRUB SERPL-MCNC: 0.24 MG/DL (ref 0.2–1)
BUN SERPL-MCNC: 10 MG/DL (ref 5–25)
CALCIUM SERPL-MCNC: 8.8 MG/DL (ref 8.3–10.1)
CHLORIDE SERPL-SCNC: 107 MMOL/L (ref 100–108)
CO2 SERPL-SCNC: 22 MMOL/L (ref 21–32)
CREAT SERPL-MCNC: 0.57 MG/DL (ref 0.6–1.3)
GFR SERPL CREATININE-BSD FRML MDRD: 111 ML/MIN/1.73SQ M
GLUCOSE P FAST SERPL-MCNC: 116 MG/DL (ref 65–99)
INR PPP: 1.1 (ref 0.86–1.17)
MAGNESIUM SERPL-MCNC: 1.6 MG/DL (ref 1.6–2.6)
POTASSIUM SERPL-SCNC: 4 MMOL/L (ref 3.5–5.3)
PROT SERPL-MCNC: 8 G/DL (ref 6.4–8.2)
PROTHROMBIN TIME: 14.3 SECONDS (ref 11.8–14.2)
SODIUM SERPL-SCNC: 139 MMOL/L (ref 136–145)

## 2019-01-31 PROCEDURE — 76705 ECHO EXAM OF ABDOMEN: CPT

## 2019-01-31 PROCEDURE — 86705 HEP B CORE ANTIBODY IGM: CPT | Performed by: INTERNAL MEDICINE

## 2019-01-31 PROCEDURE — 86803 HEPATITIS C AB TEST: CPT | Performed by: INTERNAL MEDICINE

## 2019-01-31 PROCEDURE — 36415 COLL VENOUS BLD VENIPUNCTURE: CPT | Performed by: INTERNAL MEDICINE

## 2019-01-31 PROCEDURE — 85610 PROTHROMBIN TIME: CPT | Performed by: INTERNAL MEDICINE

## 2019-01-31 PROCEDURE — 87340 HEPATITIS B SURFACE AG IA: CPT | Performed by: INTERNAL MEDICINE

## 2019-01-31 PROCEDURE — 86704 HEP B CORE ANTIBODY TOTAL: CPT | Performed by: INTERNAL MEDICINE

## 2019-01-31 PROCEDURE — 80053 COMPREHEN METABOLIC PANEL: CPT | Performed by: INTERNAL MEDICINE

## 2019-01-31 PROCEDURE — 83735 ASSAY OF MAGNESIUM: CPT | Performed by: INTERNAL MEDICINE

## 2019-01-31 PROCEDURE — 99215 OFFICE O/P EST HI 40 MIN: CPT | Performed by: INTERNAL MEDICINE

## 2019-01-31 RX ORDER — ALBUTEROL SULFATE 90 UG/1
2 AEROSOL, METERED RESPIRATORY (INHALATION) EVERY 4 HOURS PRN
Qty: 18 INHALER | Refills: 0 | Status: SHIPPED | OUTPATIENT
Start: 2019-01-31 | End: 2019-03-18 | Stop reason: SDUPTHER

## 2019-01-31 RX ORDER — PERMETHRIN 50 MG/G
CREAM TOPICAL ONCE
Qty: 60 G | Refills: 0 | Status: SHIPPED | OUTPATIENT
Start: 2019-01-31 | End: 2019-01-31

## 2019-01-31 RX ORDER — METOPROLOL SUCCINATE 25 MG/1
25 TABLET, EXTENDED RELEASE ORAL DAILY
Qty: 30 TABLET | Refills: 1 | Status: SHIPPED | OUTPATIENT
Start: 2019-01-31 | End: 2019-03-29 | Stop reason: SDUPTHER

## 2019-01-31 RX ORDER — FOLIC ACID 1 MG/1
1 TABLET ORAL DAILY
Qty: 30 TABLET | Refills: 1 | Status: SHIPPED | OUTPATIENT
Start: 2019-01-31 | End: 2019-03-29 | Stop reason: SDUPTHER

## 2019-01-31 RX ORDER — LANOLIN ALCOHOL/MO/W.PET/CERES
100 CREAM (GRAM) TOPICAL DAILY
Qty: 30 TABLET | Refills: 1 | Status: SHIPPED | OUTPATIENT
Start: 2019-01-31 | End: 2019-04-01 | Stop reason: SDUPTHER

## 2019-01-31 NOTE — PROGRESS NOTES
Assessment/Plan:         Diagnoses and all orders for this visit:   this is a very complicated patient with many  social economical challenges  Elevated liver enzymes  -     US liver; Future  -      thiamine 100 MG tablet; Take 1 tablet (100 mg total) by mouth daily  -     folic acid (FOLVITE) 1 mg tablet; Take 1 tablet (1 mg total) by mouth daily  -     Chronic Hepatitis Panel; Future  -     Comprehensive metabolic panel; Future  -     Protime-INR; Future  -     Ambulatory referral to Gastroenterology; Future  -     Chronic Hepatitis Panel  -     Comprehensive metabolic panel  -     Protime-INR    Essential hypertension  -     metoprolol succinate (TOPROL-XL) 25 mg 24 hr tablet; Take 1 tablet (25 mg total) by mouth daily   her blood pressures been persistently elevated  I would  Start her metoprolol  She took her blood sugar checked by her friend Matti Cameron who help her with transportation and is a medical assistant  beta-blocker would potentially be helpful reduce portal hypertension  scabies  -     permethrin (ELIMITE) 5 % cream; Apply topically once for 1 dose   cleaning instructions  reiterated  Bronchitis  -     albuterol (PROVENTIL HFA,VENTOLIN HFA) 90 mcg/act inhaler; Inhale 2 puffs every 4 (four) hours as needed for wheezing    Alcohol use  -     Magnesium; Future  -     Chronic Hepatitis Panel; Future  -     Comprehensive metabolic panel; Future  -     Protime-INR; Future  -     Ambulatory referral to Gastroenterology; Future  -     Magnesium  -     Chronic Hepatitis Panel  -     Comprehensive metabolic panel  -     Protime-INR     I was started on thiamine folic acid  Abstinence again advised  Subjective:      Patient ID: Vanessa Montero is a 53 y o  female  Hypertension   Associated symptoms include shortness of breath  Pertinent negatives include no chest pain  patient with history of COPD chronic alcohol use is here to follow-up on recent lab studies  Her liver enzymes were elevated    Her ALT was up to 89 and   Alk-phos has increased to 146  Total protein 8 9 with albumin at 3 6  No recent hepatitis studies  Patient denies using any IV drug use  She does drink 6 can beer a day  She has cut down to 3-4 a week since we had this discussion last visit  She reports no abdominal pain nausea vomiting hematemesis melena hematochezia or easy bruisability  Patient does mention dyspepsia and has been on a PPI  She denies any dysphagia  No weight changes  she denies chest pain lightheadedness palpitation  Her blood pressure is high today  Gratefully she is not smoking for almost a month now  She had an appointment with Pulmonary and has a follow-up scheduled   She also mentions a right that she has been noticing for the last several weeks now  Is a concern of scabies by a recent visit her in the house  She had had pruritus for long time but now is seeing new lesions both on herself and her son  The pruritus is throughout the day  Does not get worse at night or after hot shower  The following portions of the patient's history were reviewed and updated as appropriate: allergies, current medications, past family history, past medical history, past social history, past surgical history and problem list     Review of Systems   Constitutional: Negative for appetite change, chills and fever  HENT: Positive for congestion  Negative for sore throat  Eyes: Positive for itching  Respiratory: Positive for cough and shortness of breath  As above   Cardiovascular: Negative for chest pain and leg swelling  Gastrointestinal: Negative for abdominal pain, blood in stool, constipation, diarrhea and nausea  Genitourinary: Negative for difficulty urinating and hematuria  Musculoskeletal: Positive for back pain  Skin: Positive for rash  See above  Pruritus   Neurological: Negative for dizziness  Psychiatric/Behavioral: The patient is nervous/anxious  Objective:      /90 (BP Location: Left arm, Patient Position: Sitting, Cuff Size: Standard)   Pulse 97   Temp 98 6 °F (37 °C) (Tympanic)   Ht 4' 10 5" (1 486 m)   Wt 47 2 kg (104 lb)   SpO2 98%   BMI 21 37 kg/m²          Physical Exam   Constitutional: She is oriented to person, place, and time  No distress  Eyes: Conjunctivae are normal  No scleral icterus  Mild irritation of eyelids   Cardiovascular: Normal rate, regular rhythm and normal heart sounds  No murmur heard  Pulmonary/Chest: No respiratory distress  She has wheezes (Few occasional wheeze)  She has no rales  Decreased breath sounds   Abdominal: Soft  Bowel sounds are normal  She exhibits no distension  There is no tenderness  There is no rebound and no guarding  Liver able two fingers below the right rib cage  No free fluid  Nontender   Musculoskeletal: She exhibits no edema  Neurological: She is alert and oriented to person, place, and time  Skin: Rash ( few papular rash arms, abdomen legs  No burrowing lesions seen) noted  Psychiatric: She has a normal mood and affect     Anxious

## 2019-02-01 ENCOUNTER — TELEPHONE (OUTPATIENT)
Dept: FAMILY MEDICINE CLINIC | Facility: CLINIC | Age: 49
End: 2019-02-01

## 2019-02-01 DIAGNOSIS — R76.8 HEPATITIS C ANTIBODY TEST POSITIVE: Primary | ICD-10-CM

## 2019-02-01 PROBLEM — B17.10 ACUTE HEPATITIS C VIRUS INFECTION WITHOUT HEPATIC COMA: Status: ACTIVE | Noted: 2019-02-01

## 2019-02-01 LAB
HBV CORE AB SER QL: ABNORMAL
HBV CORE IGM SER QL: ABNORMAL
HBV SURFACE AG SER QL: ABNORMAL
HCV AB SER QL: ABNORMAL

## 2019-02-01 NOTE — TELEPHONE ENCOUNTER
----- Message from Afua Simmons MD sent at 2/1/2019 10:20 AM EST -----  Barrier protection as well  Blood glucose level is high watch carbohydrate intake

## 2019-02-04 ENCOUNTER — TELEPHONE (OUTPATIENT)
Dept: FAMILY MEDICINE CLINIC | Facility: CLINIC | Age: 49
End: 2019-02-04

## 2019-02-04 ENCOUNTER — DOCUMENTATION (OUTPATIENT)
Dept: PULMONOLOGY | Facility: CLINIC | Age: 49
End: 2019-02-04

## 2019-02-04 ENCOUNTER — APPOINTMENT (OUTPATIENT)
Dept: LAB | Facility: MEDICAL CENTER | Age: 49
End: 2019-02-04
Payer: COMMERCIAL

## 2019-02-04 DIAGNOSIS — R76.8 HEPATITIS C ANTIBODY TEST POSITIVE: ICD-10-CM

## 2019-02-04 PROCEDURE — 36415 COLL VENOUS BLD VENIPUNCTURE: CPT

## 2019-02-04 PROCEDURE — 87522 HEPATITIS C REVRS TRNSCRPJ: CPT

## 2019-02-04 NOTE — PROGRESS NOTES
Last week patient came in requesting a printed copy of a recent PFT done by Dr Jeyson Crowder in [facility]  I told the patient I would check on it and see to print it for them  I asked Raffy Sneed to print out the PFT  She printed the PFT and as I went to give her the printed PFT, 2 patients came in to be checked in  So I checked the patients in and when I went to call Steffanie mikhail, her and her caregiver had already left

## 2019-02-04 NOTE — TELEPHONE ENCOUNTER
Could you please give her her hepatitis C resolved  Also please emphasize follow up with GI  She could see ENT Dr Sydney Haile

## 2019-02-04 NOTE — TELEPHONE ENCOUNTER
Patient has some concerns about lump middle of her throat slight discomfort , state it wake her up during the night   Can you refer her to ENT ? Patient had her labs done today at [address] [place] [address]   Requesting for Liver Screening results 02/01/2019

## 2019-02-05 ENCOUNTER — CONSULT (OUTPATIENT)
Dept: GASTROENTEROLOGY | Facility: MEDICAL CENTER | Age: 49
End: 2019-02-05
Payer: COMMERCIAL

## 2019-02-05 VITALS
HEIGHT: 59 IN | BODY MASS INDEX: 21.57 KG/M2 | SYSTOLIC BLOOD PRESSURE: 128 MMHG | TEMPERATURE: 98 F | DIASTOLIC BLOOD PRESSURE: 82 MMHG | WEIGHT: 107 LBS | HEART RATE: 112 BPM

## 2019-02-05 DIAGNOSIS — R74.8 ELEVATED LIVER ENZYMES: ICD-10-CM

## 2019-02-05 DIAGNOSIS — R13.19 OTHER DYSPHAGIA: ICD-10-CM

## 2019-02-05 DIAGNOSIS — K21.9 GASTROESOPHAGEAL REFLUX DISEASE WITH HIATAL HERNIA: ICD-10-CM

## 2019-02-05 DIAGNOSIS — R76.8 HEPATITIS C ANTIBODY TEST POSITIVE: Primary | ICD-10-CM

## 2019-02-05 DIAGNOSIS — K44.9 GASTROESOPHAGEAL REFLUX DISEASE WITH HIATAL HERNIA: ICD-10-CM

## 2019-02-05 DIAGNOSIS — Z12.11 COLON CANCER SCREENING: ICD-10-CM

## 2019-02-05 DIAGNOSIS — Z72.89 ALCOHOL USE: ICD-10-CM

## 2019-02-05 PROCEDURE — 99244 OFF/OP CNSLTJ NEW/EST MOD 40: CPT | Performed by: INTERNAL MEDICINE

## 2019-02-05 NOTE — LETTER
February 6, 2019     MD Surekha [address]  [address]    Patient: Shadia Bob   YOB: 1970   Date of Visit: 2/5/2019       Dear Dr Thomas Richardson: Thank you for referring Shadia Bob to me for evaluation  Below are my notes for this consultation  If you have questions, please do not hesitate to call me  I look forward to following your patient along with you  Sincerely,        Grayson Escoto DO        CC: No Recipients  Grayson Escoto DO  2/6/2019  3:16 PM  Sign at close encounter  [facility] - Outpatient Consultation  Shadia Bob 53 y o  female MRN: [id number]  Encounter: [id number]          ASSESSMENT AND PLAN:      Ilya Bryant was seen today for elevated lfts, dysphagia and sore throat  Diagnoses and all orders for this visit:    1  Hepatitis C antibody test positive- already got Hep C quantitative viral load; will await this result to see if she has chronic Hep C; discussed not sharing toothbrushes and razors     2  Elevated liver enzymes  -     Likely due to chronic Hepatitis C, does not appear to be cirrhotic     3  Alcohol abuse  -      Discussed stopping alcohol as her liver enzymes are already elevated, this is imperative   -discussed AA as well    4  Gastroesophageal reflux disease with hiatal hernia    5  Colon cancer screening  Patient is due for a colonoscopy in 3 years  6  Other dysphagia  I will schedule her for an EGD  Risks and benefits of the procedure were    discussed  Risks include, but are not limited to bleeding, perforation, missed lesions  She is agreeable to the procedure  -     Case request operating room: ESOPHAGOGASTRODUODENOSCOPY (EGD);  Standing  -     Case request operating room: ESOPHAGOGASTRODUODENOSCOPY (EGD)    Follow up in 3-4 months     ______________________________________________________________________    HPI:  Shadia Bob is a 53 y o  female here for the evaluation and treatment of elevated LFTs and dysphagia  She is currently taking Omeprazole 40mg orally everyday  She denies any family history of liver disease  She denies IV drug use and denies having any blood transfusions or surgeries done as a young woman  She denies any family history of colon cancer  Patient complains of acid reflux and feels as if her throat is closing up when she tries to swallow solids and liquids for the past couple of months  Patient smokes on and off and consumes ETOH daily  REVIEW OF SYSTEMS:    CONSTITUTIONAL: Denies any fever, chills, rigors, and weight loss  HEENT: No earache or tinnitus  Denies hearing loss or visual disturbances  CARDIOVASCULAR: No chest pain or palpitations  RESPIRATORY: Denies any cough, hemoptysis, shortness of breath or dyspnea on exertion  GASTROINTESTINAL: As noted in the History of Present Illness  GENITOURINARY: No problems with urination  Denies any hematuria or dysuria  NEUROLOGIC: No dizziness or vertigo, denies headaches  MUSCULOSKELETAL: Denies any muscle or joint pain  SKIN: Denies skin rashes or itching  ENDOCRINE: Denies excessive thirst  Denies intolerance to heat or cold  PSYCHOSOCIAL: Denies depression or anxiety  Denies any recent memory loss         Historical Information   Past Medical History:   Diagnosis Date    Anxiety     Depression      Past Surgical History:   Procedure Laterality Date    BUNIONECTOMY Left      Social History   History   Alcohol Use    3 6 oz/week    6 Cans of beer per week     Comment: occasional     History   Drug Use No     History   Smoking Status    Former Smoker    Packs/day: 1 50    Years: 15 00    Types: Cigarettes    Start date: 5    Quit date: 12/2018   Smokeless Tobacco    Never Used     Comment: Stop smoking a month ago /Dec  2018     Family History   Problem Relation Age of Onset    Diabetes Mother     COPD Mother     Lung cancer Mother     Lung cancer Father        Meds/Allergies       Current Outpatient Prescriptions:     albuterol (2 5 mg/3 mL) 0 083 % nebulizer solution    albuterol (PROVENTIL HFA,VENTOLIN HFA) 90 mcg/act inhaler    benzonatate (TESSALON PERLES) 100 mg capsule    [employer]S NICOTINE 7 MG/24HR TD 24 hr patch    fexofenadine (ALLEGRA) 180 MG tablet    fluticasone (FLONASE) 50 mcg/act nasal spray    fluticasone-umeclidinium-vilanterol (TRELEGY ELLIPTA) 100-62 5-25 MCG/INH inhaler    fluticasone-umeclidinium-vilanterol (TRELEGY ELLIPTA) 100-62 5-25 MCG/INH inhaler    folic acid (FOLVITE) 1 mg tablet    metoprolol succinate (TOPROL-XL) 25 mg 24 hr tablet    MUCUS RELIEF 400 MG    naproxen (NAPROSYN) 500 mg tablet    omeprazole (PriLOSEC) 40 MG capsule    PARoxetine (PAXIL) 10 mg tablet    PARoxetine (PAXIL) 20 mg tablet    thiamine 100 MG tablet    Allergies   Allergen Reactions    No Active Allergies            Objective     Height 4' 10 5" (1 486 m), weight 48 5 kg (107 lb)  Body mass index is 21 98 kg/m²  PHYSICAL EXAM:      General Appearance:   Alert, cooperative, no distress   HEENT:   Normocephalic, atraumatic, anicteric      Neck:  Supple, symmetrical, trachea midline   Lungs:   Clear to auscultation bilaterally; no rales, rhonchi or wheezing; respirations unlabored    Heart[de-identified]   Regular rate and rhythm; no murmur, rub, or gallop  Abdomen:   Soft, non-tender, non-distended; normal bowel sounds; no masses, no organomegaly    Genitalia:   Deferred    Rectal:   Deferred    Extremities:  No cyanosis, clubbing or edema    Pulses:  2+ and symmetric    Skin:  No jaundice, rashes, or lesions    Lymph nodes:  No palpable cervical lymphadenopathy        Lab Results:   No visits with results within 1 Day(s) from this visit     Latest known visit with results is:   Office Visit on 01/31/2019   Component Date Value    Magnesium 01/31/2019 1 6     Hepatitis B Surface Ag 01/31/2019 Non-reactive     Hepatitis C Ab 01/31/2019 High Reactive*    Hep B C IgM 01/31/2019 Non-reactive     Hep B Core Total Ab 01/31/2019 Non-reactive     Sodium 01/31/2019 139     Potassium 01/31/2019 4 0     Chloride 01/31/2019 107     CO2 01/31/2019 22     ANION GAP 01/31/2019 10     BUN 01/31/2019 10     Creatinine 01/31/2019 0 57*    Glucose, Fasting 01/31/2019 116*    Calcium 01/31/2019 8 8     AST 01/31/2019 76*    ALT 01/31/2019 61     Alkaline Phosphatase 01/31/2019 117*    Total Protein 01/31/2019 8 0     Albumin 01/31/2019 4 1     Total Bilirubin 01/31/2019 0 24     eGFR 01/31/2019 111     Protime 01/31/2019 14 3*    INR 01/31/2019 1 10          Radiology Results:   Xr Chest Pa & Lateral    Result Date: 1/14/2019  Narrative: CHEST INDICATION:   J44 1: Chronic obstructive pulmonary disease with (acute) exacerbation  Cough and congestion  Quit smoking one month ago  COMPARISON:  None EXAM PERFORMED/VIEWS:  XR CHEST PA & LATERAL FINDINGS: Cardiomediastinal silhouette appears unremarkable  Emphysematous changes are noted consistent with chronic obstructive pulmonary disease  Linear right basilar density likely atelectasis or scarring  No airspace opacity to suggest focal pneumonia  No pneumothorax or pleural effusion  Osseous structures appear within normal limits for patient age  Impression: COPD  No acute cardiopulmonary disease  Workstation performed: [id number]     Us Right Upper Quadrant    Result Date: 2/1/2019  Narrative: RIGHT UPPER QUADRANT ULTRASOUND INDICATION:     R74 8: Abnormal levels of other serum enzymes  COMPARISON:  None TECHNIQUE:   Real-time ultrasound of the right upper quadrant was performed with a curvilinear transducer with both volumetric sweeps and still imaging techniques  FINDINGS: PANCREAS:  Visualized portions of the pancreas are within normal limits  AORTA AND IVC:  Visualized portions are normal for patient age  LIVER: Size:  Within normal range  The liver measures 15 8 cm in the midclavicular line  Contour:  Surface contour is smooth   Parenchyma: Echogenicity and echotexture are within normal limits  No evidence of suspicious mass  Limited imaging of the main portal vein shows it to be patent and hepatopetal   BILIARY: The gallbladder is normal in caliber  No wall thickening or pericholecystic fluid  No stones or sludge identified  No sonographic Reyes's sign  No intrahepatic biliary dilatation  CBD measures 4 mm  No choledocholithiasis  KIDNEY: Right kidney measures 9 8 x 4 4 cm  Within normal limits  ASCITES:   None  Impression: Unremarkable exam  Workstation performed: [id number]           Attestation:   By signing my name below, ILucia, attest that this documentation has been    prepared under the direction and in the presence of BISI Geronimo  Electronically    Signed: Abdoulaye Goel  2/5/19        Radha NÚÑEZ, personally performed the services described in this documentation  All medical record entries made by the cheryliblucita were at my direction and in my presence  I    have reviewed the chart and discharge instructions and agree that the record reflects my    personal performance and is accurate and complete   BISI Geronimo  2/5/19

## 2019-02-05 NOTE — PROGRESS NOTES
[facility] - Outpatient Consultation  Steffanie Mendez 53 y o  female MRN: [id number]  Encounter: [id number]          ASSESSMENT AND PLAN:      Ilya Friend was seen today for elevated lfts, dysphagia and sore throat  Diagnoses and all orders for this visit:    1  Hepatitis C antibody test positive- already got Hep C quantitative viral load; will await this result to see if she has chronic Hep C; discussed not sharing toothbrushes and razors     2  Elevated liver enzymes  -     Likely due to chronic Hepatitis C, does not appear to be cirrhotic     3  Alcohol abuse  -      Discussed stopping alcohol as her liver enzymes are already elevated, this is imperative   -discussed AA as well    4  Gastroesophageal reflux disease with hiatal hernia    5  Colon cancer screening  Patient is due for a colonoscopy in 3 years  6  Other dysphagia  I will schedule her for an EGD  Risks and benefits of the procedure were    discussed  Risks include, but are not limited to bleeding, perforation, missed lesions  She is agreeable to the procedure  -     Case request operating room: ESOPHAGOGASTRODUODENOSCOPY (EGD); Standing  -     Case request operating room: ESOPHAGOGASTRODUODENOSCOPY (EGD)    Follow up in 3-4 months     ______________________________________________________________________    HPI:  Shadia Bob is a 53 y o  female here for the evaluation and treatment of elevated LFTs and dysphagia  She is currently taking Omeprazole 40mg orally everyday  She denies any family history of liver disease  She denies IV drug use and denies having any blood transfusions or surgeries done as a young woman  She denies any family history of colon cancer  Patient complains of acid reflux and feels as if her throat is closing up when she tries to swallow solids and liquids for the past couple of months  Patient smokes on and off and consumes ETOH daily        REVIEW OF SYSTEMS:    CONSTITUTIONAL: Denies any fever, chills, rigors, and weight loss  HEENT: No earache or tinnitus  Denies hearing loss or visual disturbances  CARDIOVASCULAR: No chest pain or palpitations  RESPIRATORY: Denies any cough, hemoptysis, shortness of breath or dyspnea on exertion  GASTROINTESTINAL: As noted in the History of Present Illness  GENITOURINARY: No problems with urination  Denies any hematuria or dysuria  NEUROLOGIC: No dizziness or vertigo, denies headaches  MUSCULOSKELETAL: Denies any muscle or joint pain  SKIN: Denies skin rashes or itching  ENDOCRINE: Denies excessive thirst  Denies intolerance to heat or cold  PSYCHOSOCIAL: Denies depression or anxiety  Denies any recent memory loss         Historical Information   Past Medical History:   Diagnosis Date    Anxiety     Depression      Past Surgical History:   Procedure Laterality Date    BUNIONECTOMY Left      Social History   History   Alcohol Use    3 6 oz/week    6 Cans of beer per week     Comment: occasional     History   Drug Use No     History   Smoking Status    Former Smoker    Packs/day: 1 50    Years: 15 00    Types: Cigarettes    Start date: 5    Quit date: 12/2018   Smokeless Tobacco    Never Used     Comment: Stop smoking a month ago /Dec  2018     Family History   Problem Relation Age of Onset    Diabetes Mother     COPD Mother     Lung cancer Mother     Lung cancer Father        Meds/Allergies       Current Outpatient Prescriptions:     albuterol (2 5 mg/3 mL) 0 083 % nebulizer solution    albuterol (PROVENTIL HFA,VENTOLIN HFA) 90 mcg/act inhaler    benzonatate (TESSALON PERLES) 100 mg capsule    CVS NICOTINE 7 MG/24HR TD 24 hr patch    fexofenadine (ALLEGRA) 180 MG tablet    fluticasone (FLONASE) 50 mcg/act nasal spray    fluticasone-umeclidinium-vilanterol (TRELEGY ELLIPTA) 100-62 5-25 MCG/INH inhaler    fluticasone-umeclidinium-vilanterol (TRELEGY ELLIPTA) 100-62 5-25 MCG/INH inhaler    folic acid (FOLVITE) 1 mg tablet   metoprolol succinate (TOPROL-XL) 25 mg 24 hr tablet    MUCUS RELIEF 400 MG    naproxen (NAPROSYN) 500 mg tablet    omeprazole (PriLOSEC) 40 MG capsule    PARoxetine (PAXIL) 10 mg tablet    PARoxetine (PAXIL) 20 mg tablet    thiamine 100 MG tablet    Allergies   Allergen Reactions    No Active Allergies            Objective     Height 4' 10 5" (1 486 m), weight 48 5 kg (107 lb)  Body mass index is 21 98 kg/m²  PHYSICAL EXAM:      General Appearance:   Alert, cooperative, no distress   HEENT:   Normocephalic, atraumatic, anicteric      Neck:  Supple, symmetrical, trachea midline   Lungs:   Clear to auscultation bilaterally; no rales, rhonchi or wheezing; respirations unlabored    Heart[de-identified]   Regular rate and rhythm; no murmur, rub, or gallop  Abdomen:   Soft, non-tender, non-distended; normal bowel sounds; no masses, no organomegaly    Genitalia:   Deferred    Rectal:   Deferred    Extremities:  No cyanosis, clubbing or edema    Pulses:  2+ and symmetric    Skin:  No jaundice, rashes, or lesions    Lymph nodes:  No palpable cervical lymphadenopathy        Lab Results:   No visits with results within 1 Day(s) from this visit     Latest known visit with results is:   Office Visit on 01/31/2019   Component Date Value    Magnesium 01/31/2019 1 6     Hepatitis B Surface Ag 01/31/2019 Non-reactive     Hepatitis C Ab 01/31/2019 High Reactive*    Hep B C IgM 01/31/2019 Non-reactive     Hep B Core Total Ab 01/31/2019 Non-reactive     Sodium 01/31/2019 139     Potassium 01/31/2019 4 0     Chloride 01/31/2019 107     CO2 01/31/2019 22     ANION GAP 01/31/2019 10     BUN 01/31/2019 10     Creatinine 01/31/2019 0 57*    Glucose, Fasting 01/31/2019 116*    Calcium 01/31/2019 8 8     AST 01/31/2019 76*    ALT 01/31/2019 61     Alkaline Phosphatase 01/31/2019 117*    Total Protein 01/31/2019 8 0     Albumin 01/31/2019 4 1     Total Bilirubin 01/31/2019 0 24     eGFR 01/31/2019 111     Protime 01/31/2019 14 3*    INR 01/31/2019 1 10          Radiology Results:   Xr Chest Pa & Lateral    Result Date: 1/14/2019  Narrative: CHEST INDICATION:   J44 1: Chronic obstructive pulmonary disease with (acute) exacerbation  Cough and congestion  Quit smoking one month ago  COMPARISON:  None EXAM PERFORMED/VIEWS:  XR CHEST PA & LATERAL FINDINGS: Cardiomediastinal silhouette appears unremarkable  Emphysematous changes are noted consistent with chronic obstructive pulmonary disease  Linear right basilar density likely atelectasis or scarring  No airspace opacity to suggest focal pneumonia  No pneumothorax or pleural effusion  Osseous structures appear within normal limits for patient age  Impression: COPD  No acute cardiopulmonary disease  Workstation performed: [id number]     Us Right Upper Quadrant    Result Date: 2/1/2019  Narrative: RIGHT UPPER QUADRANT ULTRASOUND INDICATION:     R74 8: Abnormal levels of other serum enzymes  COMPARISON:  None TECHNIQUE:   Real-time ultrasound of the right upper quadrant was performed with a curvilinear transducer with both volumetric sweeps and still imaging techniques  FINDINGS: PANCREAS:  Visualized portions of the pancreas are within normal limits  AORTA AND IVC:  Visualized portions are normal for patient age  LIVER: Size:  Within normal range  The liver measures 15 8 cm in the midclavicular line  Contour:  Surface contour is smooth  Parenchyma:  Echogenicity and echotexture are within normal limits  No evidence of suspicious mass  Limited imaging of the main portal vein shows it to be patent and hepatopetal   BILIARY: The gallbladder is normal in caliber  No wall thickening or pericholecystic fluid  No stones or sludge identified  No sonographic Reyes's sign  No intrahepatic biliary dilatation  CBD measures 4 mm  No choledocholithiasis  KIDNEY: Right kidney measures 9 8 x 4 4 cm  Within normal limits  ASCITES:   None       Impression: Unremarkable exam  Workstation performed: [id number]           Attestation:   By signing my name below, IShasha, attest that this documentation has been    prepared under the direction and in the presence of BISI Thomas  Electronically    Signed: Abdoulaye Ridley  2/5/19        Odalys NÚÑEZ, personally performed the services described in this documentation  All medical record entries made by the scribe were at my direction and in my presence  I    have reviewed the chart and discharge instructions and agree that the record reflects my    personal performance and is accurate and complete   BISI Thomas  2/5/19

## 2019-02-05 NOTE — PATIENT INSTRUCTIONS
Pt is scheduled with dr Jene Halsted on 4/11/19 for egd at [employer], I offered different dates and [facility] with sooner dates pt refused   She is aware she will need a  to and from procedure and get a call the day before with exact time of arrival  Follow up is scheduled with pa as per checkout list

## 2019-02-06 LAB
HCV RNA SERPL NAA+PROBE-ACNC: NORMAL IU/ML
HCV RNA SERPL NAA+PROBE-LOG IU: 4.64 LOG10 IU/ML
TEST INFORMATION: NORMAL

## 2019-02-13 ENCOUNTER — TELEPHONE (OUTPATIENT)
Dept: GASTROENTEROLOGY | Facility: AMBULARY SURGERY CENTER | Age: 49
End: 2019-02-13

## 2019-02-13 NOTE — TELEPHONE ENCOUNTER
Spoke with patient and discussed results of hepatitis C blood work and requirements for treatment  She verbalized understanding and stated she was interested in treatment  She will work on sobriety for treatment and follow up with Dr Trinidad Dancer

## 2019-02-13 NOTE — TELEPHONE ENCOUNTER
----- Message from Radha El DO sent at 2/12/2019 10:52 PM EST -----  Please call pt and tell her she does have chronic Hep C  Right now she is to focus on getting sober from alcohol and see me in the office in a few months time  Thanks,  Gia Young    ----- Message -----  From: Jaiden Summers MD  Sent: 2/7/2019   1:34 PM  To: DO Blaze Geronimo,  Please see her viral load    Thank you for seeing her,  Olegario Mcdonald

## 2019-02-13 NOTE — TELEPHONE ENCOUNTER
Please see attached encounter  Clerical please contact patient to schedule office visit with Dr Rafael Salas in a couple months    Thank you

## 2019-02-18 DIAGNOSIS — M54.50 CHRONIC BILATERAL LOW BACK PAIN WITHOUT SCIATICA: ICD-10-CM

## 2019-02-18 DIAGNOSIS — G89.29 CHRONIC BILATERAL LOW BACK PAIN WITHOUT SCIATICA: ICD-10-CM

## 2019-02-19 RX ORDER — NAPROXEN 500 MG/1
TABLET ORAL
Qty: 60 TABLET | Refills: 1 | Status: SHIPPED | OUTPATIENT
Start: 2019-02-19 | End: 2019-06-25 | Stop reason: SDUPTHER

## 2019-03-07 ENCOUNTER — OFFICE VISIT (OUTPATIENT)
Dept: PULMONOLOGY | Facility: CLINIC | Age: 49
End: 2019-03-07
Payer: COMMERCIAL

## 2019-03-07 ENCOUNTER — OFFICE VISIT (OUTPATIENT)
Dept: URGENT CARE | Facility: MEDICAL CENTER | Age: 49
End: 2019-03-07
Payer: COMMERCIAL

## 2019-03-07 VITALS
OXYGEN SATURATION: 96 % | HEART RATE: 90 BPM | HEIGHT: 59 IN | DIASTOLIC BLOOD PRESSURE: 78 MMHG | RESPIRATION RATE: 22 BRPM | SYSTOLIC BLOOD PRESSURE: 124 MMHG | TEMPERATURE: 98.7 F | WEIGHT: 107.6 LBS | BODY MASS INDEX: 21.69 KG/M2

## 2019-03-07 VITALS
BODY MASS INDEX: 22.46 KG/M2 | RESPIRATION RATE: 16 BRPM | OXYGEN SATURATION: 97 % | DIASTOLIC BLOOD PRESSURE: 75 MMHG | HEART RATE: 87 BPM | WEIGHT: 107 LBS | TEMPERATURE: 98.2 F | SYSTOLIC BLOOD PRESSURE: 140 MMHG | HEIGHT: 58 IN

## 2019-03-07 DIAGNOSIS — B00.1 HERPES LABIALIS: Primary | ICD-10-CM

## 2019-03-07 DIAGNOSIS — R06.00 DOE (DYSPNEA ON EXERTION): ICD-10-CM

## 2019-03-07 DIAGNOSIS — J44.9 COPD (CHRONIC OBSTRUCTIVE PULMONARY DISEASE) (HCC): Primary | ICD-10-CM

## 2019-03-07 PROCEDURE — 99283 EMERGENCY DEPT VISIT LOW MDM: CPT | Performed by: FAMILY MEDICINE

## 2019-03-07 PROCEDURE — 99215 OFFICE O/P EST HI 40 MIN: CPT | Performed by: INTERNAL MEDICINE

## 2019-03-07 PROCEDURE — 99203 OFFICE O/P NEW LOW 30 MIN: CPT | Performed by: FAMILY MEDICINE

## 2019-03-07 PROCEDURE — G0382 LEV 3 HOSP TYPE B ED VISIT: HCPCS | Performed by: FAMILY MEDICINE

## 2019-03-07 RX ORDER — VALACYCLOVIR HYDROCHLORIDE 1 G/1
TABLET, FILM COATED ORAL
Qty: 4 TABLET | Refills: 0 | Status: SHIPPED | OUTPATIENT
Start: 2019-03-07 | End: 2019-04-23 | Stop reason: ALTCHOICE

## 2019-03-07 NOTE — PROGRESS NOTES
[facility]        NAME: De Number is a 53 y o  female  : 1970    MRN: [id number]  DATE: 2019  TIME: 2:30 PM    Assessment and Plan   Herpes labialis [B00 1]  1  Herpes labialis  lidocaine (XYLOCAINE) 2 % topical gel    valACYclovir (VALTREX) 1,000 mg tablet         Patient Instructions       Follow up with PCP in 3-5 days  Proceed to  ER if symptoms worsen  Chief Complaint     Chief Complaint   Patient presents with    Oral Pain     Patient here with blister  at right side of mouth for one week  Also stating of "sores in her left nostril " also  History of Present Illness       Patient here with which she believes is cool sore the right corner of her mouth the past week  It is burning  Has now spread to her lower lips and she believes into her nostril  She has had cold sores in the past but never this bad  Denies any purulent drainage  No fever  He has been applying Vaseline ointment with no noticeable improvement  Review of Systems   Review of Systems   Skin: Positive for rash and wound           Current Medications       Current Outpatient Medications:     albuterol (2 5 mg/3 mL) 0 083 % nebulizer solution, INHALE 3ML BY NEBULIZTION ROUTE 3 TIMES DAILY, Disp: , Rfl: 10    albuterol (PROVENTIL HFA,VENTOLIN HFA) 90 mcg/act inhaler, Inhale 2 puffs every 4 (four) hours as needed for wheezing, Disp: 18 Inhaler, Rfl: 0    [employer] NICOTINE 7 MG/24HR TD 24 hr patch, APPLY 1 PATCH EVERY DAY, Disp: 28 patch, Rfl: 0    fluticasone (FLONASE) 50 mcg/act nasal spray, 1 spray into each nostril daily, Disp: 1 Bottle, Rfl: 5    fluticasone-umeclidinium-vilanterol (TRELEGY ELLIPTA) 100-62 5-25 MCG/INH inhaler, Inhale 1 puff daily Rinse mouth after use , Disp: 1 Inhaler, Rfl: 5    fluticasone-umeclidinium-vilanterol (TRELEGY ELLIPTA) 100-62 5-25 MCG/INH inhaler, Inhale 1 puff daily Rinse mouth after use , Disp: 2 Inhaler, Rfl: 0    folic acid (FOLVITE) 1 mg tablet, Take 1 tablet (1 mg total) by mouth daily, Disp: 30 tablet, Rfl: 1    metoprolol succinate (TOPROL-XL) 25 mg 24 hr tablet, Take 1 tablet (25 mg total) by mouth daily, Disp: 30 tablet, Rfl: 1    MUCUS RELIEF 400 MG, TAKE 1 TABLET BY ORAL ROUTE EVERY 4 HOURS AS NEEDED, Disp: , Rfl: 0    naproxen (NAPROSYN) 500 mg tablet, TAKE 1 TABLET BY MOUTH TWICE A DAY WITH MEALS, Disp: 60 tablet, Rfl: 1    omeprazole (PriLOSEC) 40 MG capsule, TAKE 1 CAPSULE BY ORAL ROUTE EVERY DAY, Disp: 30 capsule, Rfl: 5    thiamine 100 MG tablet, Take 1 tablet (100 mg total) by mouth daily, Disp: 30 tablet, Rfl: 1    lidocaine (XYLOCAINE) 2 % topical gel, Apply 1 application topically as needed for mild pain, Disp: 30 mL, Rfl: 0    valACYclovir (VALTREX) 1,000 mg tablet, Take 2 tablets on onset, repeat in 12 hours x1 dose, Disp: 4 tablet, Rfl: 0    Current Allergies     Allergies as of 03/07/2019 - Reviewed 03/07/2019   Allergen Reaction Noted    No active allergies  05/23/2018            The following portions of the patient's history were reviewed and updated as appropriate: allergies, current medications, past family history, past medical history, past social history, past surgical history and problem list      Past Medical History:   Diagnosis Date    Anxiety     Depression        Past Surgical History:   Procedure Laterality Date    BUNIONECTOMY Left        Family History   Problem Relation Age of Onset    Diabetes Mother     COPD Mother     Lung cancer Mother     Lung cancer Father          Medications have been verified  Objective   /75   Pulse 87   Temp 98 2 °F (36 8 °C) (Tympanic)   Resp 16   Ht 4' 10" (1 473 m)   Wt 48 5 kg (107 lb)   SpO2 97%   BMI 22 36 kg/m²        Physical Exam     Physical Exam   HENT:   Mouth/Throat: Oropharynx is clear and moist    Skin:   Corner right lower lip reveals fissuring with erythema    There is also evidence of vesicular lesion of the right upper and lower lip which are tender to touch   Findings are consistent with herpes labialis  Nursing note and vitals reviewed

## 2019-03-07 NOTE — PROGRESS NOTES
Office Progress Note - Pulmonary    Silvia Smallwood 53 y o  female MRN: [id number]    Encounter: [id number]      Assessment:  · Severe COPD  · Dyspnea on exertion  · Intermittent tobacco use  Plan:   · Trelegy 1 inhalation once a day  · Smoking cessation  · CT scan of the chest without IV contrast for lung cancer screening  · Follow-up in 6 months  Discussion:   The patient has severe COPD  Her dyspnea on exertion is due to the severe COPD  Overall her symptoms have improved with the use of Trelegy  I had a long discussion with her about the nature of her disease  I have explained to her that she should completely quit smoking  I have recommended to have a low-dose CT scan of the chest without IV contrast for lung cancer screening and the patient agreed  I have ordered a CT of the chest   I will see her in 6 months  Subjective: The patient is here for a follow-up visit  Her cough and dyspnea on exertion have improved to a certain degree  She still has occasional cough with clear sputum  Denies any chest pain  She has no nocturnal symptoms  She still smoking a few cigarettes a day based on stress according to her  She is using Trelegy once a day  Review of systems:  A 12 point system review is done and aside from what is stated above the rest of the review of systems is negative  Family history and social history are reviewed  Medications list is reviewed  Vitals: Blood pressure 124/78, pulse 90, temperature 98 7 °F (37 1 °C), temperature source Tympanic, resp  rate 22, height 4' 10 5" (1 486 m), weight 48 8 kg (107 lb 9 6 oz), SpO2 96 %  ,     Physical Exam  Gen: Awake, alert, oriented x 3, no acute distress  HEENT: Mucous membranes moist, no oral lesions, no thrush  NECK: No accessory muscle use, JVP not elevated  Cardiac: Regular, single S1, single S2, no murmurs, no rubs, no gallops  Lungs:  Decreased breath sounds  No wheezing or rhonchi    Abdomen: normoactive bowel sounds, soft nontender, nondistended, no rebound or rigidity, no guarding  Extremities: no cyanosis, no clubbing, no edema  Neuro:  Grossly nonfocal   Skin:  No rash

## 2019-03-07 NOTE — PATIENT INSTRUCTIONS
I prescribed xylocaine and jelly 2% to be applied liberally to open sore of the lip and nostril  Oral Herpes Simplex Virus Infections   WHAT YOU NEED TO KNOW:   Oral herpes simplex virus (HSV) infections cause sores to form on the mouth, lips, or gums  HSV has 2 types  Oral HSV infections are most often caused by HSV type 1  HSV type 2 normally affects the genital area, but may also occur in the mouth  After you are infected, the virus hides in your nerves and may return  An HSV infection that comes back is also known as a cold sore  DISCHARGE INSTRUCTIONS:   Medicines:   · Antiviral medicine: This decreases symptoms and shortens the amount of time blisters are present  You may also need to take it daily to prevent blisters  The medicine may be given as a liquid, pill, or ointment  Use as directed  · Numbing medicine: This decreases mouth pain  It is usually given as a mouth rinse  Use it before you eat or drink, or as directed  Follow up with your healthcare provider as directed:  Write down your questions so you remember to ask them during your visits  Self-care:   · Eat soft, bland foods:  Avoid salty, acidic, spicy, sharp-edged, and hard foods  Eat healthy foods to help healing  · Drink liquids:  Cool liquids may help soothe your mouth and numb the pain  Avoid citrus or carbonated drinks, such as orange or grapefruit juice, lemonade, or soda  These liquids may cause your mouth to hurt more  A straw may help if you have blisters on the lips or tongue  · Use ice:  Ice helps decrease swelling and pain  Drink cold water or suck on ice to help decrease pain on your tongue or inside your mouth  Use an ice pack, or put crushed ice in a plastic bag on your lip  Cover it with a towel and place it on your lip for 15 to 20 minutes every hour or as directed    Prevent the spread of the herpes simplex virus:   · Do not have close contact with people until the blisters heal  This includes touching, kissing, and oral sex  · Do not get close to babies or to people who are sick while you have cold sores  · Do not share eating utensils, towels, lip balm, or makeup with another person  · Do not touch the blisters or pick at the scabs  Do not touch other body parts, especially your eyes or genitals without washing your hands first  Wash your hands often  Contact your healthcare provider if:   · You have a fever  · Your symptoms become worse or do not improve a week after you start treatment  · You have difficulty eating or drinking because of the pain in your mouth  · You get a headache, are nauseated, or vomit  · Your eyes feel irritated, or you feel like you have something in your eye  · Your skin becomes itchy, swollen, or develops a rash after you take your medicine  · You have questions or concerns about your condition or care  Return to the emergency department if:   · You get a fever, feel achy, or see pus instead of clear fluid in the sores  · You get sores on your eyes  · You have abdominal pain, a severe headache, or confusion  · You get new symptoms, or old symptoms return after you have been treated  © 2017 [employer]  Information is for End User's use only and may not be sold, redistributed or otherwise used for commercial purposes  All illustrations and images included in CareNotes® are the copyrighted property of [employer] , [employer]  or Aryan Dey  The above information is an  only  It is not intended as medical advice for individual conditions or treatments  Talk to your doctor, nurse or pharmacist before following any medical regimen to see if it is safe and effective for you  Pharyngitis   WHAT YOU NEED TO KNOW:   Pharyngitis, or sore throat, is inflammation of the tissues and structures in your pharynx (throat)  Pharyngitis is most often caused by bacteria  It may also be caused by a cold or flu virus   Other causes include smoking, allergies, or acid reflux  DISCHARGE INSTRUCTIONS:   Call 911 for any of the following:   · You have trouble breathing or swallowing because your throat is swollen or sore  Return to the emergency department if:   · You are drooling because it hurts too much to swallow  · Your fever is higher than 102? F (39?C) or lasts longer than 3 days  · You are confused  · You taste blood in your throat  Contact your healthcare provider if:   · Your throat pain gets worse  · You have a painful lump in your throat that does not go away after 5 days  · Your symptoms do not improve after 5 days  · You have questions or concerns about your condition or care  Medicines:  Viral pharyngitis will go away on its own without treatment  Your sore throat should start to feel better in 3 to 5 days for both viral and bacterial infections  You may need any of the following:  · Antibiotics  treat a bacterial infection  · NSAIDs , such as ibuprofen, help decrease swelling, pain, and fever  NSAIDs can cause stomach bleeding or kidney problems in certain people  If you take blood thinner medicine, always ask your healthcare provider if NSAIDs are safe for you  Always read the medicine label and follow directions  · Acetaminophen  decreases pain and fever  It is available without a doctor's order  Ask how much to take and how often to take it  Follow directions  Acetaminophen can cause liver damage if not taken correctly  · Take your medicine as directed  Contact your healthcare provider if you think your medicine is not helping or if you have side effects  Tell him or her if you are allergic to any medicine  Keep a list of the medicines, vitamins, and herbs you take  Include the amounts, and when and why you take them  Bring the list or the pill bottles to follow-up visits  Carry your medicine list with you in case of an emergency  Manage your symptoms:   · Gargle salt water    Mix ¼ teaspoon salt in an 8 ounce glass of warm water and gargle  This may help decrease swelling in your throat  · Drink liquids as directed  You may need to drink more liquids than usual  Liquids may help soothe your throat and prevent dehydration  Ask how much liquid to drink each day and which liquids are best for you  · Use a cool-steam humidifier  to help moisten the air in your room and calm your cough  · Soothe your throat  with cough drops, ice, soft foods, or popsicles  Prevent the spread of pharyngitis:  Cover your mouth and nose when you cough or sneeze  Do not share food or drinks  Wash your hands often  Use soap and water  If soap and water are unavailable, use an alcohol based hand   Follow up with your healthcare provider as directed:  Write down your questions so you remember to ask them during your visits  © 2017 [employer] Information is for End User's use only and may not be sold, redistributed or otherwise used for commercial purposes  All illustrations and images included in CareNotes® are the copyrighted property of [employer] , [employer]  or Aryan Dey  The above information is an  only  It is not intended as medical advice for individual conditions or treatments  Talk to your doctor, nurse or pharmacist before following any medical regimen to see if it is safe and effective for you  Valtrex 1000 mg -2 tablets at onset, repeat in 12 hours x1 dose  Follow up with primary care provider is source persist or worsen

## 2019-03-14 ENCOUNTER — OFFICE VISIT (OUTPATIENT)
Dept: FAMILY MEDICINE CLINIC | Facility: CLINIC | Age: 49
End: 2019-03-14
Payer: COMMERCIAL

## 2019-03-14 VITALS
OXYGEN SATURATION: 99 % | HEART RATE: 90 BPM | DIASTOLIC BLOOD PRESSURE: 72 MMHG | SYSTOLIC BLOOD PRESSURE: 124 MMHG | BODY MASS INDEX: 23.3 KG/M2 | WEIGHT: 111 LBS | HEIGHT: 58 IN

## 2019-03-14 DIAGNOSIS — Z23 ENCOUNTER FOR VACCINATION: Primary | ICD-10-CM

## 2019-03-14 DIAGNOSIS — J42 CHRONIC BRONCHITIS, UNSPECIFIED CHRONIC BRONCHITIS TYPE (HCC): ICD-10-CM

## 2019-03-14 DIAGNOSIS — J01.00 ACUTE NON-RECURRENT MAXILLARY SINUSITIS: ICD-10-CM

## 2019-03-14 PROCEDURE — 3008F BODY MASS INDEX DOCD: CPT | Performed by: INTERNAL MEDICINE

## 2019-03-14 PROCEDURE — 99213 OFFICE O/P EST LOW 20 MIN: CPT | Performed by: INTERNAL MEDICINE

## 2019-03-14 RX ORDER — AMOXICILLIN 875 MG/1
875 TABLET, COATED ORAL 2 TIMES DAILY
Qty: 14 TABLET | Refills: 0 | Status: SHIPPED | OUTPATIENT
Start: 2019-03-14 | End: 2019-03-21

## 2019-03-14 NOTE — PROGRESS NOTES
Assessment/Plan:         Diagnoses and all orders for this visit:    Encounter for vaccination  -     Cancel: TDAP VACCINE GREATER THAN OR EQUAL TO 6YO IM    Acute non-recurrent maxillary sinusitis  -     amoxicillin (AMOXIL) 875 mg tablet; Take 1 tablet (875 mg total) by mouth 2 (two) times a day for 7 days  Increase fluid intake, rest, saline gargles, saline nasal irrigation, NSAID/tylenol as tolerated  Follow up if symptoms getting worse or seek immediate medical help for emergency  Pt understands the plan  Tobacco abstinence  Chronic bronchitis, unspecified chronic bronchitis type ([address] )    continue current regimen    Subjective:      Patient ID: Leia Stephens is a 53 y o  female  Sinusitis   This is a new problem  Episode onset: 5 days ago  The maximum temperature recorded prior to her arrival was 100 4 - 100 9 F  Associated symptoms include chills, congestion, coughing, a hoarse voice, sinus pressure, sneezing and a sore throat  Pertinent negatives include no shortness of breath  Patient is not smoking anymore  She has been using inhalers regularly  She has not had any alcoholic  drink except for isolated occasion to celebrate grandniece birth  Has an EGD coming up  The following portions of the patient's history were reviewed and updated as appropriate: allergies, current medications, past medical history, past social history and problem list     Review of Systems   Constitutional: Positive for chills  Negative for fever  HENT: Positive for congestion, hoarse voice, postnasal drip, sinus pressure, sinus pain, sneezing and sore throat  Respiratory: Positive for cough  Negative for shortness of breath  Skin: Positive for rash (Herpes labialis)  Neurological: Negative for dizziness           Objective:      /72 (BP Location: Right arm, Patient Position: Sitting, Cuff Size: Standard)   Pulse 90   Ht 4' 10" (1 473 m)   Wt 50 3 kg (111 lb)   SpO2 99%   BMI 23 20 kg/m² Physical Exam   HENT:   Right Ear: External ear normal    Left Ear: External ear normal    Mouth/Throat: Oropharynx is clear and moist    Positive facial pressure   Eyes: Conjunctivae are normal    Cardiovascular: Normal rate, regular rhythm and normal heart sounds  No murmur heard  Pulmonary/Chest: Effort normal and breath sounds normal  No stridor  No respiratory distress  She has no wheezes  Neurological: She is alert

## 2019-03-15 ENCOUNTER — TELEPHONE (OUTPATIENT)
Dept: PULMONOLOGY | Facility: CLINIC | Age: 49
End: 2019-03-15

## 2019-03-15 NOTE — TELEPHONE ENCOUNTER
Justin Dennis wanted me to fax patient's medical testing to an 's office  I explained to her that we need written authorization from the patient as it is against the law to release any information without patient's consent  Patient and Justin Dennis showed up in the office seeking copy of the patient's db and notes from 1/10/19  I printed the information and gave it to the patient  Then they wanted me to fax to an   I explained via Nathaly Parry that we need documentation requesting that we fax the same to an    We can only do via communication to make sure we have all things legally done

## 2019-03-18 DIAGNOSIS — J40 BRONCHITIS: ICD-10-CM

## 2019-03-18 NOTE — TELEPHONE ENCOUNTER
As well Patient requested an envelope so I gave her one of our office envelopes  We do not have plain envelopes

## 2019-03-21 ENCOUNTER — ANNUAL EXAM (OUTPATIENT)
Dept: GYNECOLOGY | Facility: CLINIC | Age: 49
End: 2019-03-21
Payer: COMMERCIAL

## 2019-03-21 VITALS
DIASTOLIC BLOOD PRESSURE: 70 MMHG | SYSTOLIC BLOOD PRESSURE: 120 MMHG | BODY MASS INDEX: 22.34 KG/M2 | HEIGHT: 59 IN | TEMPERATURE: 97.6 F | WEIGHT: 110.8 LBS | RESPIRATION RATE: 17 BRPM

## 2019-03-21 DIAGNOSIS — Z11.51 SCREENING FOR HUMAN PAPILLOMAVIRUS (HPV): ICD-10-CM

## 2019-03-21 DIAGNOSIS — Z12.31 ENCOUNTER FOR SCREENING MAMMOGRAM FOR MALIGNANT NEOPLASM OF BREAST: ICD-10-CM

## 2019-03-21 DIAGNOSIS — Z01.419 ENCOUNTER FOR GYNECOLOGICAL EXAMINATION (GENERAL) (ROUTINE) WITHOUT ABNORMAL FINDINGS: Primary | ICD-10-CM

## 2019-03-21 PROCEDURE — G0145 SCR C/V CYTO,THINLAYER,RESCR: HCPCS | Performed by: OBSTETRICS & GYNECOLOGY

## 2019-03-21 PROCEDURE — 99396 PREV VISIT EST AGE 40-64: CPT | Performed by: OBSTETRICS & GYNECOLOGY

## 2019-03-21 PROCEDURE — 87624 HPV HI-RISK TYP POOLED RSLT: CPT | Performed by: OBSTETRICS & GYNECOLOGY

## 2019-03-21 NOTE — PROGRESS NOTES
Assessment/Plan:       Diagnoses and all orders for this visit:    Encounter for gynecological examination (general) (routine) without abnormal findings    Encounter for screening mammogram for malignant neoplasm of breast  -     Mammo screening bilateral w cad; Future          Subjective:      Patient ID: Silvia Smallwood is a 51 y o  female  The patient is a 64-year-old who presents for an annual gyn exam   She has had 2 vaginal deliveries the last 113 years ago  She has not been to a gynecologist since then  She reports that she is still having periods, although them are very light and she misses a month occasionally  She had some hot flashes during the summer, but these have gone away  She denies any intermenstrual bleeding  Her menses usually last for 5 days  She has no dysmenorrhea  The patient denies any breast problems or bladder issues  She states she is not sexually active  The patient was given a menstrual calendar today to keep track of her bleeding episodes  She will bring this in in 6 months to review her menstrual pattern  The patient has never had a mammography and she was encouraged to get 1 done this year  The patient states that she stopped smoking  Her significant other is still smoking and will not go outside to smoke  She is therefore still exposed to secondhand smoke  The following portions of the patient's history were reviewed and updated as appropriate: allergies, current medications, past family history, past medical history, past social history, past surgical history and problem list     Review of Systems   Constitutional: Negative  Respiratory: Negative for shortness of breath  Cardiovascular: Negative for chest pain  Gastrointestinal: Negative  Genitourinary: Negative  Skin: Negative  Psychiatric/Behavioral: Negative for agitation, behavioral problems and confusion           Objective:      /70 (BP Location: Right arm, Patient Position: Sitting, Cuff Size: Standard)   Temp 97 6 °F (36 4 °C) (Tympanic)   Resp 17   Ht 4' 10 5" (1 486 m)   Wt 50 3 kg (110 lb 12 8 oz)   LMP 03/11/2019 (Approximate)   BMI 22 76 kg/m²          Physical Exam   Constitutional: She appears well-developed and well-nourished  No distress  HENT:   Head: Normocephalic  Pulmonary/Chest: Effort normal  No respiratory distress  Right breast exhibits no inverted nipple, no mass, no nipple discharge, no skin change and no tenderness  Left breast exhibits no inverted nipple, no mass, no nipple discharge, no skin change and no tenderness  Breasts are symmetrical    Abdominal: She exhibits no distension and no mass  There is no tenderness  There is no rebound and no guarding  Genitourinary: Rectum normal and uterus normal  No labial fusion  There is no rash, tenderness, lesion or injury on the right labia  There is no rash, tenderness, lesion or injury on the left labia  Uterus is not tender  Cervix exhibits no motion tenderness, no discharge and no friability  Right adnexum displays no mass, no tenderness and no fullness  Left adnexum displays no mass, no tenderness and no fullness  No erythema, tenderness or bleeding in the vagina  No foreign body in the vagina  No signs of injury around the vagina  No vaginal discharge found  Lymphadenopathy:        Right axillary: No pectoral and no lateral adenopathy present  Left axillary: No pectoral and no lateral adenopathy present  Skin: Skin is warm, dry and intact  She is not diaphoretic  No cyanosis  Nails show no clubbing  Psychiatric: She has a normal mood and affect   Her speech is normal and behavior is normal

## 2019-03-22 LAB
HPV HR 12 DNA CVX QL NAA+PROBE: NEGATIVE
HPV16 DNA CVX QL NAA+PROBE: NEGATIVE
HPV18 DNA CVX QL NAA+PROBE: NEGATIVE

## 2019-03-27 LAB
LAB AP GYN PRIMARY INTERPRETATION: NORMAL
Lab: NORMAL

## 2019-03-28 ENCOUNTER — TELEPHONE (OUTPATIENT)
Dept: PULMONOLOGY | Facility: CLINIC | Age: 49
End: 2019-03-28

## 2019-03-28 ENCOUNTER — TELEPHONE (OUTPATIENT)
Dept: OTHER | Facility: HOSPITAL | Age: 49
End: 2019-03-28

## 2019-03-28 NOTE — TELEPHONE ENCOUNTER
----- Message from Lindsay Benítez sent at 3/26/2019  8:27 AM EDT -----  Regarding: Peer To Peer  My request for a prior auth for Steffanie Mendez's CT scan is not being approved  I have submitted her progress note and CXR, they are asking for peer to peer  Please let me know what you want to do  Phone# [phone]  Tracie Jim  [birth year]  Tracking# [id number]      Called patient and explained that her ct is not covered and if she wanted the other lung screening that the doctor talked to her about can be done at [facility] and that I will have the provider put the order in and I will let her and her advocate know   She stated that she understood and agreed with the plan

## 2019-03-29 ENCOUNTER — TELEPHONE (OUTPATIENT)
Dept: GYNECOLOGY | Facility: CLINIC | Age: 49
End: 2019-03-29

## 2019-03-29 DIAGNOSIS — I10 ESSENTIAL HYPERTENSION: ICD-10-CM

## 2019-03-29 DIAGNOSIS — R74.8 ELEVATED LIVER ENZYMES: ICD-10-CM

## 2019-03-29 RX ORDER — METOPROLOL SUCCINATE 25 MG/1
TABLET, EXTENDED RELEASE ORAL
Qty: 30 TABLET | Refills: 1 | Status: SHIPPED | OUTPATIENT
Start: 2019-03-29 | End: 2019-04-01 | Stop reason: SDUPTHER

## 2019-03-29 RX ORDER — FOLIC ACID 1 MG/1
TABLET ORAL
Qty: 30 TABLET | Refills: 1 | Status: SHIPPED | OUTPATIENT
Start: 2019-03-29 | End: 2019-04-01 | Stop reason: SDUPTHER

## 2019-03-29 NOTE — TELEPHONE ENCOUNTER
----- Message from Denise Frye MD sent at 3/27/2019 10:49 AM EDT -----  Notify pt that her pap and HPV are normal

## 2019-04-01 ENCOUNTER — TELEPHONE (OUTPATIENT)
Dept: FAMILY MEDICINE CLINIC | Facility: CLINIC | Age: 49
End: 2019-04-01

## 2019-04-01 DIAGNOSIS — I10 ESSENTIAL HYPERTENSION: ICD-10-CM

## 2019-04-01 DIAGNOSIS — R74.8 ELEVATED LIVER ENZYMES: ICD-10-CM

## 2019-04-01 RX ORDER — FOLIC ACID 1 MG/1
1000 TABLET ORAL DAILY
Qty: 30 TABLET | Refills: 1 | Status: SHIPPED | OUTPATIENT
Start: 2019-04-01 | End: 2019-07-26 | Stop reason: SDUPTHER

## 2019-04-01 RX ORDER — LANOLIN ALCOHOL/MO/W.PET/CERES
100 CREAM (GRAM) TOPICAL DAILY
Qty: 30 TABLET | Refills: 1 | Status: SHIPPED | OUTPATIENT
Start: 2019-04-01 | End: 2019-05-30 | Stop reason: SDUPTHER

## 2019-04-01 RX ORDER — METOPROLOL SUCCINATE 25 MG/1
25 TABLET, EXTENDED RELEASE ORAL DAILY
Qty: 30 TABLET | Refills: 1 | Status: SHIPPED | OUTPATIENT
Start: 2019-04-01 | End: 2019-07-23 | Stop reason: SDUPTHER

## 2019-04-10 ENCOUNTER — TELEPHONE (OUTPATIENT)
Dept: GASTROENTEROLOGY | Facility: AMBULARY SURGERY CENTER | Age: 49
End: 2019-04-10

## 2019-04-10 ENCOUNTER — ANESTHESIA EVENT (OUTPATIENT)
Dept: PERIOP | Facility: HOSPITAL | Age: 49
End: 2019-04-10

## 2019-04-11 ENCOUNTER — ANESTHESIA (OUTPATIENT)
Dept: PERIOP | Facility: HOSPITAL | Age: 49
End: 2019-04-11

## 2019-04-22 ENCOUNTER — TELEPHONE (OUTPATIENT)
Dept: GASTROENTEROLOGY | Facility: AMBULARY SURGERY CENTER | Age: 49
End: 2019-04-22

## 2019-04-22 DIAGNOSIS — B18.2 HEP C W/O COMA, CHRONIC (HCC): Primary | ICD-10-CM

## 2019-04-23 ENCOUNTER — OFFICE VISIT (OUTPATIENT)
Dept: FAMILY MEDICINE CLINIC | Facility: CLINIC | Age: 49
End: 2019-04-23
Payer: COMMERCIAL

## 2019-04-23 VITALS
DIASTOLIC BLOOD PRESSURE: 82 MMHG | BODY MASS INDEX: 23.93 KG/M2 | WEIGHT: 114 LBS | OXYGEN SATURATION: 97 % | SYSTOLIC BLOOD PRESSURE: 140 MMHG | HEIGHT: 58 IN | HEART RATE: 87 BPM

## 2019-04-23 DIAGNOSIS — J44.9 CHRONIC OBSTRUCTIVE PULMONARY DISEASE, UNSPECIFIED COPD TYPE (HCC): ICD-10-CM

## 2019-04-23 DIAGNOSIS — K44.9 GASTROESOPHAGEAL REFLUX DISEASE WITH HIATAL HERNIA: Primary | ICD-10-CM

## 2019-04-23 DIAGNOSIS — I10 ESSENTIAL HYPERTENSION: ICD-10-CM

## 2019-04-23 DIAGNOSIS — K21.9 GASTROESOPHAGEAL REFLUX DISEASE WITH HIATAL HERNIA: Primary | ICD-10-CM

## 2019-04-23 DIAGNOSIS — R76.8 HEPATITIS C ANTIBODY TEST POSITIVE: ICD-10-CM

## 2019-04-23 PROBLEM — F17.200 TOBACCO DEPENDENCE SYNDROME: Status: RESOLVED | Noted: 2017-10-25 | Resolved: 2019-04-23

## 2019-04-23 PROCEDURE — 99214 OFFICE O/P EST MOD 30 MIN: CPT | Performed by: INTERNAL MEDICINE

## 2019-04-23 PROCEDURE — 3008F BODY MASS INDEX DOCD: CPT | Performed by: INTERNAL MEDICINE

## 2019-05-30 ENCOUNTER — TELEPHONE (OUTPATIENT)
Dept: GASTROENTEROLOGY | Facility: AMBULARY SURGERY CENTER | Age: 49
End: 2019-05-30

## 2019-05-30 DIAGNOSIS — R74.8 ELEVATED LIVER ENZYMES: ICD-10-CM

## 2019-05-30 RX ORDER — DEXTRIN 3 G/3.8 G
POWDER (GRAM) ORAL
Qty: 30 TABLET | Refills: 1 | Status: SHIPPED | OUTPATIENT
Start: 2019-05-30 | End: 2019-08-09 | Stop reason: SDUPTHER

## 2019-05-31 ENCOUNTER — TELEPHONE (OUTPATIENT)
Dept: FAMILY MEDICINE CLINIC | Facility: CLINIC | Age: 49
End: 2019-05-31

## 2019-06-03 ENCOUNTER — OFFICE VISIT (OUTPATIENT)
Dept: GASTROENTEROLOGY | Facility: MEDICAL CENTER | Age: 49
End: 2019-06-03
Payer: COMMERCIAL

## 2019-06-03 ENCOUNTER — TELEPHONE (OUTPATIENT)
Dept: GASTROENTEROLOGY | Facility: AMBULARY SURGERY CENTER | Age: 49
End: 2019-06-03

## 2019-06-03 VITALS
BODY MASS INDEX: 23.24 KG/M2 | WEIGHT: 111.2 LBS | HEART RATE: 82 BPM | SYSTOLIC BLOOD PRESSURE: 130 MMHG | DIASTOLIC BLOOD PRESSURE: 76 MMHG | TEMPERATURE: 98.6 F

## 2019-06-03 DIAGNOSIS — B18.2 CHRONIC HEPATITIS C WITHOUT HEPATIC COMA (HCC): ICD-10-CM

## 2019-06-03 DIAGNOSIS — R13.19 ESOPHAGEAL DYSPHAGIA: Primary | ICD-10-CM

## 2019-06-03 DIAGNOSIS — B18.2 HEP C W/O COMA, CHRONIC (HCC): Primary | ICD-10-CM

## 2019-06-03 PROCEDURE — 99214 OFFICE O/P EST MOD 30 MIN: CPT | Performed by: INTERNAL MEDICINE

## 2019-06-25 ENCOUNTER — TELEPHONE (OUTPATIENT)
Dept: GASTROENTEROLOGY | Facility: AMBULARY SURGERY CENTER | Age: 49
End: 2019-06-25

## 2019-06-25 DIAGNOSIS — M54.50 CHRONIC BILATERAL LOW BACK PAIN WITHOUT SCIATICA: ICD-10-CM

## 2019-06-25 DIAGNOSIS — G89.29 CHRONIC BILATERAL LOW BACK PAIN WITHOUT SCIATICA: ICD-10-CM

## 2019-06-25 RX ORDER — NAPROXEN 500 MG/1
TABLET ORAL
Qty: 60 TABLET | Refills: 1 | Status: SHIPPED | OUTPATIENT
Start: 2019-06-25 | End: 2019-08-25 | Stop reason: SDUPTHER

## 2019-07-23 DIAGNOSIS — I10 ESSENTIAL HYPERTENSION: ICD-10-CM

## 2019-07-23 DIAGNOSIS — K21.00 GASTROESOPHAGEAL REFLUX DISEASE WITH ESOPHAGITIS: ICD-10-CM

## 2019-07-26 DIAGNOSIS — R74.8 ELEVATED LIVER ENZYMES: ICD-10-CM

## 2019-07-29 DIAGNOSIS — J40 BRONCHITIS: ICD-10-CM

## 2019-07-29 RX ORDER — FOLIC ACID 1 MG/1
TABLET ORAL
Qty: 30 TABLET | Refills: 1 | Status: SHIPPED | OUTPATIENT
Start: 2019-07-29 | End: 2019-09-18 | Stop reason: SDUPTHER

## 2019-07-29 RX ORDER — METOPROLOL SUCCINATE 25 MG/1
TABLET, EXTENDED RELEASE ORAL
Qty: 30 TABLET | Refills: 1 | Status: SHIPPED | OUTPATIENT
Start: 2019-07-29 | End: 2019-09-18 | Stop reason: SDUPTHER

## 2019-07-29 RX ORDER — OMEPRAZOLE 40 MG/1
CAPSULE, DELAYED RELEASE ORAL
Qty: 90 CAPSULE | Refills: 1 | Status: SHIPPED | OUTPATIENT
Start: 2019-07-29 | End: 2020-01-21

## 2019-08-04 DIAGNOSIS — J30.9 ALLERGIC RHINITIS: ICD-10-CM

## 2019-08-05 RX ORDER — FLUTICASONE PROPIONATE 50 MCG
SPRAY, SUSPENSION (ML) NASAL
Qty: 16 ML | Refills: 5 | Status: SHIPPED | OUTPATIENT
Start: 2019-08-05 | End: 2020-03-13 | Stop reason: SDUPTHER

## 2019-08-09 DIAGNOSIS — R74.8 ELEVATED LIVER ENZYMES: ICD-10-CM

## 2019-08-12 RX ORDER — DEXTRIN 3 G/3.8 G
POWDER (GRAM) ORAL
Qty: 30 TABLET | Refills: 1 | Status: SHIPPED | OUTPATIENT
Start: 2019-08-12 | End: 2019-11-26 | Stop reason: SDUPTHER

## 2019-08-25 DIAGNOSIS — M54.50 CHRONIC BILATERAL LOW BACK PAIN WITHOUT SCIATICA: ICD-10-CM

## 2019-08-25 DIAGNOSIS — G89.29 CHRONIC BILATERAL LOW BACK PAIN WITHOUT SCIATICA: ICD-10-CM

## 2019-08-26 RX ORDER — NAPROXEN 500 MG/1
TABLET ORAL
Qty: 60 TABLET | Refills: 1 | Status: SHIPPED | OUTPATIENT
Start: 2019-08-26 | End: 2019-11-12 | Stop reason: SDUPTHER

## 2019-08-29 DIAGNOSIS — J40 BRONCHITIS: ICD-10-CM

## 2019-08-29 RX ORDER — ALBUTEROL SULFATE 90 UG/1
AEROSOL, METERED RESPIRATORY (INHALATION)
Qty: 8.5 INHALER | Refills: 0 | Status: SHIPPED | OUTPATIENT
Start: 2019-08-29 | End: 2019-09-24 | Stop reason: SDUPTHER

## 2019-09-12 ENCOUNTER — TELEPHONE (OUTPATIENT)
Dept: FAMILY MEDICINE CLINIC | Facility: CLINIC | Age: 49
End: 2019-09-12

## 2019-09-12 ENCOUNTER — ANNUAL EXAM (OUTPATIENT)
Dept: GYNECOLOGY | Facility: CLINIC | Age: 49
End: 2019-09-12
Payer: COMMERCIAL

## 2019-09-12 VITALS
RESPIRATION RATE: 17 BRPM | HEART RATE: 93 BPM | DIASTOLIC BLOOD PRESSURE: 70 MMHG | SYSTOLIC BLOOD PRESSURE: 118 MMHG | TEMPERATURE: 99 F | OXYGEN SATURATION: 96 % | HEIGHT: 59 IN | BODY MASS INDEX: 21.57 KG/M2 | WEIGHT: 107 LBS

## 2019-09-12 DIAGNOSIS — N95.1 MENOPAUSAL SYMPTOMS: ICD-10-CM

## 2019-09-12 DIAGNOSIS — J44.9 COPD (CHRONIC OBSTRUCTIVE PULMONARY DISEASE) (HCC): ICD-10-CM

## 2019-09-12 DIAGNOSIS — N93.9 ABNORMAL UTERINE BLEEDING: Primary | ICD-10-CM

## 2019-09-12 PROCEDURE — 99213 OFFICE O/P EST LOW 20 MIN: CPT | Performed by: OBSTETRICS & GYNECOLOGY

## 2019-09-12 NOTE — PROGRESS NOTES
Assessment/Plan:       Diagnoses and all orders for this visit:    Abnormal uterine bleeding  -     US pelvis complete w transvaginal; Future    Menopausal symptoms  -     Follicle stimulating hormone; Future  -     Estradiol; Future  -     Luteinizing hormone; Future          Subjective:      Patient ID: Dyllan Hager is a 51 y o  female  The patient the 7year-old who has had irregular bleeding for the past 6 months  She has kept a menstrual calendar and reports 1 day of spotting on 03/26, bleeding like a  [address] fourteen through 4/25, and spotting on 5/21, 6/2 through 6/4 and on 06/08  She reports significant hot flashes and night sweats  She has at least 3 or 4 during the day and the night sweats wake her up 3 times during the night  She also has intense mood swings  We discussed anovulatory perimenopausal bleeding and endometrial hyperplasia  The patient will need an ultrasound of the pelvis to measure the endometrial thickness and blood work to confirm perimenopause  If her endometrium is thickened, she will need an endometrial biopsy  This was discussed with her and she will take 3 tablets of over-the-counter ibuprofen for a total of 600 mg prior to coming in for an endometrial biopsy  She is aware that if it looks like she has a polyp, she will need a D and C with hysteroscopy instead  The following portions of the patient's history were reviewed and updated as appropriate: allergies, current medications, past family history, past medical history, past social history, past surgical history and problem list     Review of Systems   Constitutional: Negative  Respiratory: Negative for shortness of breath  Cardiovascular: Negative for chest pain  Gastrointestinal: Negative  Genitourinary: Negative  Skin: Negative  Psychiatric/Behavioral: Negative for agitation, behavioral problems and confusion           Objective:      /70 (BP Location: Left arm, Patient Position: Sitting, Cuff Size: Standard)   Pulse 93   Temp 99 °F (37 2 °C) (Tympanic)   Resp 17   Ht 4' 10 5" (1 486 m)   Wt 48 5 kg (107 lb)   LMP 06/02/2019 (Exact Date)   SpO2 96%   BMI 21 98 kg/m²          Physical Exam   Constitutional: She appears well-developed and well-nourished  No distress  HENT:   Head: Normocephalic  Eyes: No scleral icterus  Neck: Normal range of motion  Pulmonary/Chest: Effort normal    Skin: Skin is warm  No rash noted  She is not diaphoretic  No erythema  No pallor  Psychiatric: She has a normal mood and affect   Her behavior is normal  Judgment and thought content normal

## 2019-09-13 DIAGNOSIS — J44.9 COPD (CHRONIC OBSTRUCTIVE PULMONARY DISEASE) (HCC): ICD-10-CM

## 2019-09-16 ENCOUNTER — TELEPHONE (OUTPATIENT)
Dept: FAMILY MEDICINE CLINIC | Facility: CLINIC | Age: 49
End: 2019-09-16

## 2019-09-16 NOTE — TELEPHONE ENCOUNTER
Patient is scheduled in October and Alta Song said " this should be good enough" Is this ok or would you like me to move appointment up  I did not realize she had something schedule in October

## 2019-09-16 NOTE — TELEPHONE ENCOUNTER
Patient will keep apt in Oct but is out of all medications and needs them all  Asked to speak with Eden Pham but you were busy for the moment  I told her I would tell you

## 2019-09-18 DIAGNOSIS — R74.8 ELEVATED LIVER ENZYMES: ICD-10-CM

## 2019-09-18 DIAGNOSIS — I10 ESSENTIAL HYPERTENSION: ICD-10-CM

## 2019-09-18 RX ORDER — FOLIC ACID 1 MG/1
TABLET ORAL
Qty: 30 TABLET | Refills: 1 | Status: SHIPPED | OUTPATIENT
Start: 2019-09-18 | End: 2020-03-13 | Stop reason: SDUPTHER

## 2019-09-18 RX ORDER — METOPROLOL SUCCINATE 25 MG/1
TABLET, EXTENDED RELEASE ORAL
Qty: 30 TABLET | Refills: 1 | Status: SHIPPED | OUTPATIENT
Start: 2019-09-18 | End: 2019-11-29 | Stop reason: SDUPTHER

## 2019-09-24 DIAGNOSIS — J40 BRONCHITIS: ICD-10-CM

## 2019-09-24 RX ORDER — ALBUTEROL SULFATE 90 UG/1
AEROSOL, METERED RESPIRATORY (INHALATION)
Qty: 8.5 INHALER | Refills: 0 | Status: SHIPPED | OUTPATIENT
Start: 2019-09-24 | End: 2019-10-18 | Stop reason: SDUPTHER

## 2019-10-18 DIAGNOSIS — J40 BRONCHITIS: ICD-10-CM

## 2019-10-21 RX ORDER — ALBUTEROL SULFATE 90 UG/1
AEROSOL, METERED RESPIRATORY (INHALATION)
Qty: 8.5 INHALER | Refills: 0 | Status: SHIPPED | OUTPATIENT
Start: 2019-10-21 | End: 2019-11-19 | Stop reason: SDUPTHER

## 2019-10-29 ENCOUNTER — OFFICE VISIT (OUTPATIENT)
Dept: FAMILY MEDICINE CLINIC | Facility: CLINIC | Age: 49
End: 2019-10-29
Payer: COMMERCIAL

## 2019-10-29 ENCOUNTER — APPOINTMENT (OUTPATIENT)
Dept: LAB | Facility: MEDICAL CENTER | Age: 49
End: 2019-10-29
Attending: INTERNAL MEDICINE
Payer: COMMERCIAL

## 2019-10-29 ENCOUNTER — OFFICE VISIT (OUTPATIENT)
Dept: PULMONOLOGY | Facility: CLINIC | Age: 49
End: 2019-10-29
Payer: COMMERCIAL

## 2019-10-29 VITALS
SYSTOLIC BLOOD PRESSURE: 120 MMHG | HEIGHT: 59 IN | DIASTOLIC BLOOD PRESSURE: 78 MMHG | HEART RATE: 93 BPM | BODY MASS INDEX: 21.37 KG/M2 | WEIGHT: 106 LBS | TEMPERATURE: 98.5 F | OXYGEN SATURATION: 98 %

## 2019-10-29 VITALS
HEIGHT: 59 IN | HEART RATE: 87 BPM | DIASTOLIC BLOOD PRESSURE: 82 MMHG | BODY MASS INDEX: 21.77 KG/M2 | WEIGHT: 108 LBS | OXYGEN SATURATION: 98 % | SYSTOLIC BLOOD PRESSURE: 120 MMHG

## 2019-10-29 DIAGNOSIS — F17.200 TOBACCO DEPENDENCE SYNDROME: ICD-10-CM

## 2019-10-29 DIAGNOSIS — H65.192 OTHER NON-RECURRENT ACUTE NONSUPPURATIVE OTITIS MEDIA OF LEFT EAR: Primary | ICD-10-CM

## 2019-10-29 DIAGNOSIS — N95.1 MENOPAUSAL SYMPTOMS: ICD-10-CM

## 2019-10-29 DIAGNOSIS — J44.9 COPD (CHRONIC OBSTRUCTIVE PULMONARY DISEASE) (HCC): Primary | ICD-10-CM

## 2019-10-29 DIAGNOSIS — Z23 ENCOUNTER FOR IMMUNIZATION: ICD-10-CM

## 2019-10-29 DIAGNOSIS — F17.220 NICOTINE DEPENDENCE, CHEWING TOBACCO, UNCOMPLICATED: ICD-10-CM

## 2019-10-29 DIAGNOSIS — R06.00 DOE (DYSPNEA ON EXERTION): ICD-10-CM

## 2019-10-29 DIAGNOSIS — B18.2 HEP C W/O COMA, CHRONIC (HCC): ICD-10-CM

## 2019-10-29 LAB
ALBUMIN SERPL BCP-MCNC: 4.4 G/DL (ref 3.5–5)
ALP SERPL-CCNC: 147 U/L (ref 46–116)
ALT SERPL W P-5'-P-CCNC: 41 U/L (ref 12–78)
ANION GAP SERPL CALCULATED.3IONS-SCNC: 7 MMOL/L (ref 4–13)
AST SERPL W P-5'-P-CCNC: 60 U/L (ref 5–45)
BASOPHILS # BLD AUTO: 0.03 THOUSANDS/ΜL (ref 0–0.1)
BASOPHILS NFR BLD AUTO: 1 % (ref 0–1)
BILIRUB DIRECT SERPL-MCNC: 0.09 MG/DL (ref 0–0.2)
BILIRUB SERPL-MCNC: 0.26 MG/DL (ref 0.2–1)
BUN SERPL-MCNC: 5 MG/DL (ref 5–25)
CALCIUM SERPL-MCNC: 9.6 MG/DL (ref 8.3–10.1)
CHLORIDE SERPL-SCNC: 104 MMOL/L (ref 100–108)
CO2 SERPL-SCNC: 27 MMOL/L (ref 21–32)
CREAT SERPL-MCNC: 0.64 MG/DL (ref 0.6–1.3)
EOSINOPHIL # BLD AUTO: 0.18 THOUSAND/ΜL (ref 0–0.61)
EOSINOPHIL NFR BLD AUTO: 3 % (ref 0–6)
ERYTHROCYTE [DISTWIDTH] IN BLOOD BY AUTOMATED COUNT: 12.7 % (ref 11.6–15.1)
ESTRADIOL SERPL-MCNC: 13 PG/ML
FSH SERPL-ACNC: 59.8 MIU/ML
GFR SERPL CREATININE-BSD FRML MDRD: 106 ML/MIN/1.73SQ M
GLUCOSE P FAST SERPL-MCNC: 99 MG/DL (ref 65–99)
HCT VFR BLD AUTO: 42.6 % (ref 34.8–46.1)
HGB BLD-MCNC: 14.5 G/DL (ref 11.5–15.4)
IMM GRANULOCYTES # BLD AUTO: 0.03 THOUSAND/UL (ref 0–0.2)
IMM GRANULOCYTES NFR BLD AUTO: 1 % (ref 0–2)
INR PPP: 1.17 (ref 0.84–1.19)
LH SERPL-ACNC: 22.2 MIU/ML
LYMPHOCYTES # BLD AUTO: 0.81 THOUSANDS/ΜL (ref 0.6–4.47)
LYMPHOCYTES NFR BLD AUTO: 14 % (ref 14–44)
MCH RBC QN AUTO: 33.1 PG (ref 26.8–34.3)
MCHC RBC AUTO-ENTMCNC: 34 G/DL (ref 31.4–37.4)
MCV RBC AUTO: 97 FL (ref 82–98)
MONOCYTES # BLD AUTO: 0.99 THOUSAND/ΜL (ref 0.17–1.22)
MONOCYTES NFR BLD AUTO: 17 % (ref 4–12)
NEUTROPHILS # BLD AUTO: 3.9 THOUSANDS/ΜL (ref 1.85–7.62)
NEUTS SEG NFR BLD AUTO: 64 % (ref 43–75)
NRBC BLD AUTO-RTO: 0 /100 WBCS
PLATELET # BLD AUTO: 225 THOUSANDS/UL (ref 149–390)
PMV BLD AUTO: 12.1 FL (ref 8.9–12.7)
POTASSIUM SERPL-SCNC: 3.8 MMOL/L (ref 3.5–5.3)
PROT SERPL-MCNC: 8.7 G/DL (ref 6.4–8.2)
PROTHROMBIN TIME: 14.5 SECONDS (ref 11.6–14.5)
RBC # BLD AUTO: 4.38 MILLION/UL (ref 3.81–5.12)
SODIUM SERPL-SCNC: 138 MMOL/L (ref 136–145)
WBC # BLD AUTO: 5.94 THOUSAND/UL (ref 4.31–10.16)

## 2019-10-29 PROCEDURE — 82247 BILIRUBIN TOTAL: CPT

## 2019-10-29 PROCEDURE — 85610 PROTHROMBIN TIME: CPT

## 2019-10-29 PROCEDURE — 80074 ACUTE HEPATITIS PANEL: CPT

## 2019-10-29 PROCEDURE — 82172 ASSAY OF APOLIPOPROTEIN: CPT

## 2019-10-29 PROCEDURE — 85025 COMPLETE CBC W/AUTO DIFF WBC: CPT

## 2019-10-29 PROCEDURE — 86708 HEPATITIS A ANTIBODY: CPT

## 2019-10-29 PROCEDURE — 83001 ASSAY OF GONADOTROPIN (FSH): CPT

## 2019-10-29 PROCEDURE — 84460 ALANINE AMINO (ALT) (SGPT): CPT

## 2019-10-29 PROCEDURE — 3008F BODY MASS INDEX DOCD: CPT | Performed by: INTERNAL MEDICINE

## 2019-10-29 PROCEDURE — 82670 ASSAY OF TOTAL ESTRADIOL: CPT

## 2019-10-29 PROCEDURE — 80053 COMPREHEN METABOLIC PANEL: CPT

## 2019-10-29 PROCEDURE — 87902 NFCT AGT GNTYP ALYS HEP C: CPT

## 2019-10-29 PROCEDURE — 87522 HEPATITIS C REVRS TRNSCRPJ: CPT

## 2019-10-29 PROCEDURE — 86704 HEP B CORE ANTIBODY TOTAL: CPT

## 2019-10-29 PROCEDURE — 83010 ASSAY OF HAPTOGLOBIN QUANT: CPT

## 2019-10-29 PROCEDURE — 83883 ASSAY NEPHELOMETRY NOT SPEC: CPT

## 2019-10-29 PROCEDURE — 36415 COLL VENOUS BLD VENIPUNCTURE: CPT

## 2019-10-29 PROCEDURE — 99213 OFFICE O/P EST LOW 20 MIN: CPT | Performed by: INTERNAL MEDICINE

## 2019-10-29 PROCEDURE — 82977 ASSAY OF GGT: CPT

## 2019-10-29 PROCEDURE — 90471 IMMUNIZATION ADMIN: CPT

## 2019-10-29 PROCEDURE — 82248 BILIRUBIN DIRECT: CPT

## 2019-10-29 PROCEDURE — 86706 HEP B SURFACE ANTIBODY: CPT

## 2019-10-29 PROCEDURE — 83002 ASSAY OF GONADOTROPIN (LH): CPT

## 2019-10-29 PROCEDURE — 87389 HIV-1 AG W/HIV-1&-2 AB AG IA: CPT

## 2019-10-29 PROCEDURE — 90682 RIV4 VACC RECOMBINANT DNA IM: CPT

## 2019-10-29 RX ORDER — AMOXICILLIN AND CLAVULANATE POTASSIUM 875; 125 MG/1; MG/1
1 TABLET, FILM COATED ORAL EVERY 12 HOURS SCHEDULED
Qty: 20 TABLET | Refills: 0 | Status: SHIPPED | OUTPATIENT
Start: 2019-10-29 | End: 2019-11-08

## 2019-10-29 RX ORDER — RANITIDINE 300 MG/1
TABLET ORAL
COMMUNITY
Start: 2019-09-05 | End: 2020-03-13

## 2019-10-29 RX ORDER — AZELASTINE HYDROCHLORIDE 137 UG/1
SPRAY, METERED NASAL
Refills: 3 | COMMUNITY
Start: 2019-08-24 | End: 2020-03-13

## 2019-10-29 NOTE — PROGRESS NOTES
Office Progress Note - Pulmonary    Steffanie Mendez 51 y o  female MRN: [id number]    Encounter: [id number]      Assessment:   Chronic obstructive pulmonary disease   Dyspnea on exertion   Nicotine dependence  Plan:    Trelegy Ellipta 1 inhalation once a day   Albuterol rescue inhaler 4 times a day as needed   Smoking cessation   Influenza vaccine   Follow-up in 6 months  Discussion:   The patient's COPD is under treated  She will  the Trelegy Ellipta from the pharmacy today  She will use it once a day  She will use the albuterol rescue inhaler 4 times a day  I had a long discussion regarding his smoking cessation  She promised that by next visit she will quit smoking  I have reassured the patient that she does not have an infection  She does not need antibiotics  I gave the patient the influenza vaccine today  I will see her in 6 months in a follow-up visit  Subjective: The patient is here for follow-up visit  She has chest tightness and more cough  Her cough is dry  She ran out of the Trelegy  She has been using Breo instead  She is using her ProAir 4 times a day as needed  She still smoking 3-4 cigarettes a day  She denies fever or chills  No chest pain  Review of systems:  A 12 point system review is done and aside from what is stated above the rest of the review of systems is negative  Family history and social history are reviewed  Medications list is reviewed  Vitals: Blood pressure 120/78, pulse 93, temperature 98 5 °F (36 9 °C), temperature source Tympanic, height 4' 10 5" (1 486 m), weight 48 1 kg (106 lb), SpO2 98 %  ,     Physical Exam  Gen: Awake, alert, oriented x 3, no acute distress  HEENT: Mucous membranes moist, no oral lesions, no thrush  NECK: No accessory muscle use, JVP not elevated  Cardiac: Regular, single S1, single S2, no murmurs, no rubs, no gallops  Lungs:  Decreased breath sounds  No wheezing or rhonchi    Abdomen: normoactive bowel sounds, soft nontender, nondistended, no rebound or rigidity, no guarding  Extremities: no cyanosis, no clubbing, no edema  Neuro:  Grossly nonfocal   Skin:  No rash      Lab Results   Component Value Date    ALT 61 01/31/2019    AST 76 (H) 01/31/2019    ALKPHOS 117 (H) 01/31/2019

## 2019-10-29 NOTE — PROGRESS NOTES
Assessment/Plan:         Diagnoses and all orders for this visit:    Other non-recurrent acute nonsuppurative otitis media of left ear  -     amoxicillin-clavulanate (AUGMENTIN) 875-125 mg per tablet; Take 1 tablet by mouth every 12 (twelve) hours for 10 days  Increase fluid intake, rest, saline gargles,tylenol as tolerated  Follow up if symptoms getting worse or seek immediate medical help for emergency  Pt understands the plan  Tobacco dependence syndrome    Tobacco cessation again reinforced  Patient follow-up in 2 weeks to review labs and routine visit  Subjective:      Patient ID: Jana Sow is a 51 y o  female  Patient is here for an acute visit complaining of left ear pain  Earache    There is pain in the left ear  This is a new problem  The problem occurs constantly  There has been no fever  Associated symptoms include headaches and a sore throat  Pertinent negatives include no ear discharge  Patient has started smoking again  Pulmonary consult from today noted  The following portions of the patient's history were reviewed and updated as appropriate: allergies, current medications, past medical history, past social history and problem list     Review of Systems   HENT: Positive for ear pain and sore throat  Negative for ear discharge  Neurological: Positive for headaches  Objective:      /82 (BP Location: Left arm, Patient Position: Sitting, Cuff Size: Standard)   Pulse 87   Ht 4' 10 5" (1 486 m)   Wt 49 kg (108 lb)   SpO2 98%   BMI 22 19 kg/m²          Physical Exam   HENT:   Left pinna tender to touch  Left tympanic minimal erythema  Erythematous throat   Cardiovascular: Normal rate, regular rhythm and normal heart sounds  Pulmonary/Chest: Effort normal and breath sounds normal  No stridor  No respiratory distress  She has no wheezes  Lymphadenopathy:     She has cervical adenopathy

## 2019-10-30 LAB
HAV AB SER QL IA: NORMAL
HAV IGM SER QL: NORMAL
HBV CORE AB SER QL: ABNORMAL
HBV CORE IGM SER QL: ABNORMAL
HBV SURFACE AB SER-ACNC: <3.1 MIU/ML
HBV SURFACE AG SER QL: ABNORMAL
HCV AB SER QL: ABNORMAL
HIV 1+2 AB+HIV1 P24 AG SERPL QL IA: NORMAL

## 2019-10-31 LAB
HCV RNA SERPL NAA+PROBE-ACNC: 1540 IU/ML
HCV RNA SERPL NAA+PROBE-LOG IU: 3.19 LOG10 IU/ML
TEST INFORMATION: NORMAL

## 2019-11-01 ENCOUNTER — TELEPHONE (OUTPATIENT)
Dept: GYNECOLOGY | Facility: CLINIC | Age: 49
End: 2019-11-01

## 2019-11-01 LAB
A2 MACROGLOB SERPL-MCNC: 258 MG/DL (ref 110–276)
ALT SERPL W P-5'-P-CCNC: 34 IU/L (ref 0–40)
APO A-I SERPL-MCNC: 204 MG/DL (ref 116–209)
BILIRUB SERPL-MCNC: 0.1 MG/DL (ref 0–1.2)
COMMENT: ABNORMAL
FIBROSIS SCORING:: ABNORMAL
FIBROSIS STAGE SERPL QL: ABNORMAL
GGT SERPL-CCNC: 332 IU/L (ref 0–60)
HAPTOGLOB SERPL-MCNC: 243 MG/DL (ref 34–200)
INTERPRETATIONS: ABNORMAL
LIVER FIBR SCORE SERPL CALC.FIBROSURE: 0.09 (ref 0–0.21)
NECROINFLAMM ACTIVITY SCORING:: ABNORMAL
NECROINFLAMMATORY ACT GRADE SERPL QL: ABNORMAL
NECROINFLAMMATORY ACT SCORE SERPL: 0.13 (ref 0–0.17)
SERVICE CMNT-IMP: ABNORMAL

## 2019-11-01 NOTE — TELEPHONE ENCOUNTER
----- Message from Jeanne Jay MD sent at 10/30/2019  4:42 PM EDT -----  Notify pt that her BW is compatible with menopause  She needs to get the u/s of the pelvis  She may need an EMB

## 2019-11-03 LAB
HCV GENTYP SERPL NAA+PROBE: NORMAL
HCV PLEASE NOTE: NORMAL

## 2019-11-04 ENCOUNTER — TELEPHONE (OUTPATIENT)
Dept: GASTROENTEROLOGY | Facility: AMBULARY SURGERY CENTER | Age: 49
End: 2019-11-04

## 2019-11-04 DIAGNOSIS — B18.2 CHRONIC HEPATITIS C WITHOUT HEPATIC COMA (HCC): Primary | ICD-10-CM

## 2019-11-09 ENCOUNTER — TELEPHONE (OUTPATIENT)
Dept: GYNECOLOGY | Facility: CLINIC | Age: 49
End: 2019-11-09

## 2019-11-09 NOTE — TELEPHONE ENCOUNTER
I had notified the pt's care giver that pt needed to have an us and I called also yoav's number to inform her about the us

## 2019-11-09 NOTE — TELEPHONE ENCOUNTER
----- Message from Jeanne Jay MD sent at 11/7/2019  7:32 PM EST -----  Please notify pt that her BW is consistent with menopause or perimenopause  It is very important that she gets the u/s of the pelvis done

## 2019-11-12 DIAGNOSIS — M54.50 CHRONIC BILATERAL LOW BACK PAIN WITHOUT SCIATICA: ICD-10-CM

## 2019-11-12 DIAGNOSIS — G89.29 CHRONIC BILATERAL LOW BACK PAIN WITHOUT SCIATICA: ICD-10-CM

## 2019-11-12 RX ORDER — NAPROXEN 500 MG/1
TABLET ORAL
Qty: 60 TABLET | Refills: 1 | Status: SHIPPED | OUTPATIENT
Start: 2019-11-12 | End: 2020-01-09 | Stop reason: SDUPTHER

## 2019-11-13 DIAGNOSIS — B18.2 HEP C W/O COMA, CHRONIC (HCC): Primary | ICD-10-CM

## 2019-11-13 NOTE — TELEPHONE ENCOUNTER
No Dr Kenan Yun, she was not started on treatment  I am waiting for labs to be completed  Also the lab was not able to do a Genotype - from the lab:   ("Although this sample had sufficient HCV RNA as determined by quantitative methods, we were unable to determine the genotype on repeated attempts ")    I ordered a repeat Genotype on 11/4  Her care-giver, Coby Molina, is aware  She did not get the ethanol and toxicology screen

## 2019-11-13 NOTE — TELEPHONE ENCOUNTER
----- Message from Genaro Marin MD sent at 11/13/2019  9:12 AM EST -----  It seems she has low viral load  Was she started on treatment? Thanks

## 2019-11-19 DIAGNOSIS — J40 BRONCHITIS: ICD-10-CM

## 2019-11-19 RX ORDER — ALBUTEROL SULFATE 90 UG/1
AEROSOL, METERED RESPIRATORY (INHALATION)
Qty: 8.5 INHALER | Refills: 0 | Status: SHIPPED | OUTPATIENT
Start: 2019-11-19 | End: 2019-12-12 | Stop reason: SDUPTHER

## 2019-11-21 ENCOUNTER — APPOINTMENT (OUTPATIENT)
Dept: LAB | Age: 49
End: 2019-11-21
Payer: COMMERCIAL

## 2019-11-21 DIAGNOSIS — B18.2 CHRONIC HEPATITIS C WITHOUT HEPATIC COMA (HCC): ICD-10-CM

## 2019-11-21 PROCEDURE — 87902 NFCT AGT GNTYP ALYS HEP C: CPT

## 2019-11-21 PROCEDURE — 36415 COLL VENOUS BLD VENIPUNCTURE: CPT

## 2019-11-21 PROCEDURE — 80307 DRUG TEST PRSMV CHEM ANLYZR: CPT

## 2019-11-25 LAB
HCV GENTYP SERPL NAA+PROBE: NORMAL
HCV PLEASE NOTE: NORMAL

## 2019-11-26 DIAGNOSIS — R74.8 ELEVATED LIVER ENZYMES: ICD-10-CM

## 2019-11-26 RX ORDER — DEXTRIN 3 G/3.8 G
POWDER (GRAM) ORAL
Qty: 30 TABLET | Refills: 1 | Status: SHIPPED | OUTPATIENT
Start: 2019-11-26 | End: 2020-01-30

## 2019-11-29 DIAGNOSIS — I10 ESSENTIAL HYPERTENSION: ICD-10-CM

## 2019-11-29 LAB
AMPHETAMINES UR QL SCN: NEGATIVE NG/ML
BARBITURATES UR QL SCN: NEGATIVE NG/ML
BENZODIAZ UR QL: NEGATIVE NG/ML
BZE UR QL: NEGATIVE NG/ML
CANNABINOIDS UR QL SCN: POSITIVE
METHADONE UR QL SCN: NEGATIVE NG/ML
OPIATES UR QL: NEGATIVE NG/ML
PCP UR QL: NEGATIVE NG/ML
PROPOXYPH UR QL SCN: NEGATIVE NG/ML

## 2019-12-02 RX ORDER — METOPROLOL SUCCINATE 25 MG/1
TABLET, EXTENDED RELEASE ORAL
Qty: 30 TABLET | Refills: 1 | Status: SHIPPED | OUTPATIENT
Start: 2019-12-02 | End: 2020-01-27

## 2019-12-04 NOTE — TELEPHONE ENCOUNTER
Ethanol, urine is resulted, but still In process in Epic  Called [facility] and the lab has been completed  They faxed me the result and it is positive for alcohol  Result: Positive  Result  0 028      Cutoff = 0 020    I will have the result paper scanned into the Media Tab  Also, I will order the [address]  8 weeks, but I doubt [facility] will approve with a positive alcohol result  We will see  Treatment - naive    Viral load - 1540    Fibrosis Score - F0     Genotype - 1b    Please sign attached script if you agree

## 2019-12-05 LAB — ETHANOL UR-MCNC: NORMAL MG/DL

## 2019-12-12 DIAGNOSIS — J40 BRONCHITIS: ICD-10-CM

## 2019-12-12 RX ORDER — ALBUTEROL SULFATE 90 UG/1
AEROSOL, METERED RESPIRATORY (INHALATION)
Qty: 8.5 INHALER | Refills: 0 | Status: SHIPPED | OUTPATIENT
Start: 2019-12-12 | End: 2020-01-10

## 2019-12-17 ENCOUNTER — TELEPHONE (OUTPATIENT)
Dept: GASTROENTEROLOGY | Facility: AMBULARY SURGERY CENTER | Age: 49
End: 2019-12-17

## 2019-12-17 NOTE — TELEPHONE ENCOUNTER
Patients GI provider:  Orin Montero     Number to return call: [phone]    Reason for call: Pt returning missed call    Scheduled procedure/appointment date if applicable: Apt/procedure   n/a

## 2019-12-17 NOTE — TELEPHONE ENCOUNTER
Called Lashonda's phone and Tyler Boykin gave the phone to Bill  Review results with her and let her know she needs the Hep A and B vaccine  She understands and will do that  Reviewed education, side effects, directions, missed pills, labs and start date  Steffanie gave me her phone number so [facility] can call her directly  Steffanie will call with her start date

## 2019-12-20 NOTE — TELEPHONE ENCOUNTER
[facility] pharmacy called requesting more information regarding pt meds   Pharmacy would like to know where is the medication going, to our office or directly to the pt  [phone]

## 2020-01-03 ENCOUNTER — TELEPHONE (OUTPATIENT)
Dept: PULMONOLOGY | Facility: CLINIC | Age: 50
End: 2020-01-03

## 2020-01-03 ENCOUNTER — TELEPHONE (OUTPATIENT)
Dept: FAMILY MEDICINE CLINIC | Facility: CLINIC | Age: 50
End: 2020-01-03

## 2020-01-03 DIAGNOSIS — J44.9 CHRONIC OBSTRUCTIVE PULMONARY DISEASE, UNSPECIFIED COPD TYPE (HCC): Primary | ICD-10-CM

## 2020-01-03 RX ORDER — ALBUTEROL SULFATE 2.5 MG/3ML
2.5 SOLUTION RESPIRATORY (INHALATION) 3 TIMES DAILY
Qty: 90 VIAL | Refills: 5 | Status: SHIPPED | OUTPATIENT
Start: 2020-01-03 | End: 2020-03-13 | Stop reason: SDUPTHER

## 2020-01-03 NOTE — TELEPHONE ENCOUNTER
Called the patient on another matter (medicatio) and informed her that the paperwork I ready and she stated that she will pick it up on Monday  Will let Adair know  Antonio called to find out if they have the medical records ready to be picked up as she can not come into the office [address]) as her caregiver's  was ill and they will be in sometime this week to  the paperwork   Called and left a message for Concepcion Weaver to please hold the paperwork

## 2020-01-03 NOTE — TELEPHONE ENCOUNTER
Patient's care taker and the patient came into the office today demanding copies of all medical records and testing done on the patient  I explained that we can only do anything with Pulmonary and she (the patient) should be contacting the office with enough notice to get everything done  The Care giver insisted to go to the other office [facility]) to demand the records today  I explained that anyone needing the records would have to have known for sometime that they needed this for a reason  The patient then stated that she has known for some time (approx 1-2 Weeks) that she needed this for her to optain another medical insurance  I explained that I did contact my Manager and explained the situation and informed the patient that I will send the information to the proper parties and some one will get back to her as soon as they can get it done

## 2020-01-03 NOTE — TELEPHONE ENCOUNTER
Steffanie Mendez walked in on 1/3/2019 at 3:04pm , requesting Lab results from January 2019 to 103/2020  Also office visit notes   For her own personal use

## 2020-01-03 NOTE — TELEPHONE ENCOUNTER
Patient stated that she was informed by the pharmacy when she went for a refill that she needs a new script for her nebulizer medication   We did not order it originally and I asked the patient to make sure it was the Albuteral 2 5 and sent to see if we can refill it

## 2020-01-06 NOTE — TELEPHONE ENCOUNTER
FYI    Patient stating she received her medication on 1/2/2020 but has not started yet  She knows that she cannot be drinking while taking the [address] and she drinks 6- 16oz beers a day  She wants to quit but does not want to do it cold turkey  Advised she can slowly decrease the amount she drinks  Maybe decrease from 6 to 5 cans for one week, then decrease to 4 cans for 2 weeks and decrease slowly  She wants to quit drinking so she can take the [address]  She was afraid to call and tell me exactly what is going on  She said she was happy she called after we talked  I told her I will be her Hep C nurse, I'm here to support her  She has ny number and will can if any questions  Aslo suggested she could reach out to her PCP for a low dose medication  She was not interested in that route

## 2020-01-07 NOTE — TELEPHONE ENCOUNTER
Charity Lozano called regarding Steffanie and her records  She would like a call back   [phone]

## 2020-01-09 DIAGNOSIS — G89.29 CHRONIC BILATERAL LOW BACK PAIN WITHOUT SCIATICA: ICD-10-CM

## 2020-01-09 DIAGNOSIS — M54.50 CHRONIC BILATERAL LOW BACK PAIN WITHOUT SCIATICA: ICD-10-CM

## 2020-01-09 DIAGNOSIS — J40 BRONCHITIS: ICD-10-CM

## 2020-01-09 RX ORDER — NAPROXEN 500 MG/1
500 TABLET ORAL DAILY PRN
Qty: 60 TABLET | Refills: 1 | Status: SHIPPED | OUTPATIENT
Start: 2020-01-09 | End: 2020-03-09

## 2020-01-10 RX ORDER — ALBUTEROL SULFATE 90 UG/1
AEROSOL, METERED RESPIRATORY (INHALATION)
Qty: 8.5 INHALER | Refills: 0 | Status: SHIPPED | OUTPATIENT
Start: 2020-01-10 | End: 2020-03-13 | Stop reason: SDUPTHER

## 2020-01-10 RX ORDER — NAPROXEN 500 MG/1
TABLET ORAL
Qty: 60 TABLET | Refills: 0 | Status: SHIPPED | OUTPATIENT
Start: 2020-01-10 | End: 2020-03-13 | Stop reason: SDUPTHER

## 2020-01-21 DIAGNOSIS — K21.00 GASTROESOPHAGEAL REFLUX DISEASE WITH ESOPHAGITIS: ICD-10-CM

## 2020-01-21 RX ORDER — OMEPRAZOLE 40 MG/1
CAPSULE, DELAYED RELEASE ORAL
Qty: 90 CAPSULE | Refills: 0 | Status: SHIPPED | OUTPATIENT
Start: 2020-01-21 | End: 2020-03-13 | Stop reason: SDUPTHER

## 2020-01-27 DIAGNOSIS — I10 ESSENTIAL HYPERTENSION: ICD-10-CM

## 2020-01-27 RX ORDER — METOPROLOL SUCCINATE 25 MG/1
TABLET, EXTENDED RELEASE ORAL
Qty: 30 TABLET | Refills: 0 | Status: SHIPPED | OUTPATIENT
Start: 2020-01-27 | End: 2020-02-19

## 2020-01-30 ENCOUNTER — TELEPHONE (OUTPATIENT)
Dept: PULMONOLOGY | Facility: CLINIC | Age: 50
End: 2020-01-30

## 2020-01-30 DIAGNOSIS — J44.9 CHRONIC OBSTRUCTIVE PULMONARY DISEASE, UNSPECIFIED COPD TYPE (HCC): Primary | ICD-10-CM

## 2020-01-30 DIAGNOSIS — R74.8 ELEVATED LIVER ENZYMES: ICD-10-CM

## 2020-01-30 RX ORDER — DEXTRIN 3 G/3.8 G
POWDER (GRAM) ORAL
Qty: 30 TABLET | Refills: 0 | Status: SHIPPED | OUTPATIENT
Start: 2020-01-30 | End: 2020-03-13 | Stop reason: SDUPTHER

## 2020-01-30 RX ORDER — BUDESONIDE AND FORMOTEROL FUMARATE DIHYDRATE 160; 4.5 UG/1; UG/1
2 AEROSOL RESPIRATORY (INHALATION) 2 TIMES DAILY
Qty: 1 INHALER | Refills: 5 | Status: SHIPPED | OUTPATIENT
Start: 2020-01-30 | End: 2020-03-13

## 2020-01-30 NOTE — TELEPHONE ENCOUNTER
Per the patient the pharmacy said the pharmacy said that the inusrance will not cover the trelegy    Called the pharmacy to find out what is going on as I did not get notified about anything and the patient is out of medication at this time    Per the pharm trelley not covered   We will be doing symbicort and spariva per doctor

## 2020-01-31 ENCOUNTER — DOCUMENTATION (OUTPATIENT)
Dept: PULMONOLOGY | Facility: CLINIC | Age: 50
End: 2020-01-31

## 2020-02-06 ENCOUNTER — TELEPHONE (OUTPATIENT)
Dept: PULMONOLOGY | Facility: CLINIC | Age: 50
End: 2020-02-06

## 2020-02-06 NOTE — TELEPHONE ENCOUNTER
Dorcas Frederickson calling regarding Steffanie  She said Steffanie needs an order for nebulizer supplies as her old doctor who used to order it for her is no longer with the practice  Please advise

## 2020-02-06 NOTE — TELEPHONE ENCOUNTER
Called Kevin castanon and left a message that I will be contacting the patient directly as I need some information on her nebulizer machine  Left a message for the patient to call me back as I need additional information      Left a message again with the detail of the day time and that I was looking for the dme company to please when calling to give the information so that the order can be placed if we can     Called [employer] to see if they are the dme company for the patient    Called and left a message for Ana Hurtado with date, day and time I am calling to contact me back for the information of what dme company the patient is using for the supplies

## 2020-02-07 DIAGNOSIS — J45.909 UNCOMPLICATED ASTHMA, UNSPECIFIED ASTHMA SEVERITY, UNSPECIFIED WHETHER PERSISTENT: Primary | ICD-10-CM

## 2020-02-07 NOTE — TELEPHONE ENCOUNTER
Orders faxed to [facility]      Left message with Steffanie's roommate that the orders were faxed

## 2020-02-07 NOTE — TELEPHONE ENCOUNTER
John Cowan (patient's advocate), came to our [place] office to discuss Steffanie's nebulizer supplies  I told her that [address] called Steffanie four times yesterday in order to find out who her DME company was and we did not hear back  John Cowan stated that the [address] is [employer] and that they would need a lifetime supply sent over to them for her nebulizer supplies  Please order nebulizer supplies with length of need for 99 months so I can send to [facility]

## 2020-02-19 DIAGNOSIS — I10 ESSENTIAL HYPERTENSION: ICD-10-CM

## 2020-02-19 RX ORDER — METOPROLOL SUCCINATE 25 MG/1
TABLET, EXTENDED RELEASE ORAL
Qty: 30 TABLET | Refills: 0 | Status: SHIPPED | OUTPATIENT
Start: 2020-02-19 | End: 2020-03-13 | Stop reason: SDUPTHER

## 2020-02-19 NOTE — TELEPHONE ENCOUNTER
Patient is to make an appointment  Unfortunately I would not be able to can you giving her medication without seeing her    Also send her registered letter she does not return call

## 2020-02-21 NOTE — TELEPHONE ENCOUNTER
Advised Priyanka Miller that yoav needs an appointment before any more refill  She states she will call yoav and let her know and give the office a call back

## 2020-02-24 ENCOUNTER — TELEPHONE (OUTPATIENT)
Dept: PULMONOLOGY | Facility: CLINIC | Age: 50
End: 2020-02-24

## 2020-02-24 NOTE — TELEPHONE ENCOUNTER
Jethro Washington called for [facility] saying they need a prior auth for Trelegy, Pt has tried other inhalers but they don't work for her

## 2020-02-25 DIAGNOSIS — J45.40 UNCONTROLLED MODERATE PERSISTENT ASTHMA: Primary | ICD-10-CM

## 2020-02-25 NOTE — TELEPHONE ENCOUNTER
Called Nathan Lugo back and had to leave a message that I need to know if the patient had any type of reaction and what is the reason for the change as the insurance will need these answered when the prior auth is requested and that I will have the provider order the trelegy  I also stated the date and time of my call  Noah Elkins called back and she stated the medication is not helping with Shortness of breath

## 2020-02-25 NOTE — TELEPHONE ENCOUNTER
Called and left a message with Steffanie to call back  Called her twice before with no response  Also called her pt advocate, Katherin Henry  Left message for Katherin Henry to call me back concerning Steffanie,    Also called [facility] to speak with Ruy Rhodes but she was not available  I will call her tomorrow

## 2020-02-25 NOTE — TELEPHONE ENCOUNTER
Radha from [employer] called to give an update on pt's Mavyret medication  Bharathi Navarro stated pt received first dose but is unable to complete tx due to them not being able to contact pt  They have tried on several occassions but have been unsuccessful   Any questions feel free to call Bharathi Navarro at [phone] ext [phone]

## 2020-02-26 ENCOUNTER — TELEPHONE (OUTPATIENT)
Dept: GASTROENTEROLOGY | Facility: AMBULARY SURGERY CENTER | Age: 50
End: 2020-02-26
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
## 2020-02-26 NOTE — TELEPHONE ENCOUNTER
I will be very hesitating to treat if she is still abusing alcohol  Let me know what the rep says  Thanks

## 2020-02-26 NOTE — TELEPHONE ENCOUNTER
Pedro Hein is calling from [facility] to let me know that Steffanie called [facility] looking for me  I will call her now  Steffanie is having difficulty stopping drinking, but she wants to take the [address]  She has high anxiety and cannot go to a rehab or AA  She has cut down to 4-5 16 oz beers a day  I will reach out Ge Velasquez, the [facility],to ask if she can drink while taking MAvyret  Patient  is also asking about a pill to take to stop drinking  Email sent today  I will also inform Jerson from [address] with Jonas's answer  I would keep you informed

## 2020-02-27 DIAGNOSIS — J45.40 UNCONTROLLED MODERATE PERSISTENT ASTHMA: ICD-10-CM

## 2020-02-27 NOTE — TELEPHONE ENCOUNTER
Dr Raya Valentino,    I spoke to Zach Head our [address] and he said there is no contraindication to a patient drinking throughout treatment  Studies show no differences in outcomes if a patient does or does not drink alcohol  It all comes down to compliance which Steffanie wants very badly to be cured of Hep C  For herself and her 15year old son  Steffanie asked me if there was a pill that she could take so she would not drink  I asked Cuauhtemoc Granado about it  He told me about Antabuse (disufiran) which is a deterrent to drinking  It is a daily pill that has unfavorable side effects when a patient drinks  He stated that a patient can take Hep C medication and Antibuse and there are no contraindications  Let me know what you think  Thanks

## 2020-03-03 NOTE — TELEPHONE ENCOUNTER
Shana Aguero,    I am ok to treat her with myvret while she is still drinking  I am not sure about the interaction of antabuse  I would say, she can start antabuse after she is treated with Myvret  Thanks       Go Hickman

## 2020-03-03 NOTE — TELEPHONE ENCOUNTER
WHIT Stoddard,    [facility] with [address]  Explained that Dr Wale Stoddard just gave the approval for Steffanie to take the [facility] while she is drinking  Shaheen Gómez, rep from [place], confirmed that there is no reaction between alcohol and MAvyret  Steffanie is ready to get started - she will start today at [facility] and take same time every day  [facility] aware of start date  We discussed side effects, directions, missed pills, labs  4 week labs will be due on 3/31  SVR due on 7/21  I will call her prior to due date  She will call if any questions or concerns

## 2020-03-09 DIAGNOSIS — G89.29 CHRONIC BILATERAL LOW BACK PAIN WITHOUT SCIATICA: ICD-10-CM

## 2020-03-09 DIAGNOSIS — M54.50 CHRONIC BILATERAL LOW BACK PAIN WITHOUT SCIATICA: ICD-10-CM

## 2020-03-09 RX ORDER — NAPROXEN 500 MG/1
500 TABLET ORAL DAILY PRN
Qty: 30 TABLET | Refills: 0 | Status: SHIPPED | OUTPATIENT
Start: 2020-03-09 | End: 2020-03-13

## 2020-03-11 ENCOUNTER — TELEPHONE (OUTPATIENT)
Dept: GASTROENTEROLOGY | Facility: AMBULARY SURGERY CENTER | Age: 50
End: 2020-03-11

## 2020-03-11 DIAGNOSIS — B18.2 CHRONIC HEPATITIS C WITHOUT HEPATIC COMA (HCC): Primary | ICD-10-CM

## 2020-03-11 NOTE — TELEPHONE ENCOUNTER
Left message for Steffanie to call back to let me know how things are going with her Stephen Nguyen

## 2020-03-12 ENCOUNTER — TELEPHONE (OUTPATIENT)
Dept: PULMONOLOGY | Facility: CLINIC | Age: 50
End: 2020-03-12

## 2020-03-12 NOTE — TELEPHONE ENCOUNTER
Paula Knox called and wanted to know if [facility]ffanie's [facility]y  It does not look like it was can you please find out and call Paula Knox

## 2020-03-12 NOTE — TELEPHONE ENCOUNTER
WHIT    Called Steffanie and asked her how she was feeling taking her Mavyret   She says she feels great  She has no side effects and as a matter of fact she has more energy than usual      I asked about her drinking and she says that is getting a little better  Her voice was upbeat and happy

## 2020-03-13 ENCOUNTER — APPOINTMENT (OUTPATIENT)
Dept: LAB | Facility: MEDICAL CENTER | Age: 50
End: 2020-03-13
Payer: COMMERCIAL

## 2020-03-13 ENCOUNTER — OFFICE VISIT (OUTPATIENT)
Dept: FAMILY MEDICINE CLINIC | Facility: CLINIC | Age: 50
End: 2020-03-13
Payer: COMMERCIAL

## 2020-03-13 VITALS
BODY MASS INDEX: 21.57 KG/M2 | HEART RATE: 113 BPM | OXYGEN SATURATION: 97 % | WEIGHT: 105 LBS | SYSTOLIC BLOOD PRESSURE: 138 MMHG | DIASTOLIC BLOOD PRESSURE: 82 MMHG | TEMPERATURE: 96.6 F

## 2020-03-13 DIAGNOSIS — Z13.220 SCREENING FOR LIPID DISORDERS: Primary | ICD-10-CM

## 2020-03-13 DIAGNOSIS — J45.40 UNCONTROLLED MODERATE PERSISTENT ASTHMA: ICD-10-CM

## 2020-03-13 DIAGNOSIS — M54.50 CHRONIC BILATERAL LOW BACK PAIN WITHOUT SCIATICA: ICD-10-CM

## 2020-03-13 DIAGNOSIS — R21 RASH: ICD-10-CM

## 2020-03-13 DIAGNOSIS — I10 ESSENTIAL HYPERTENSION: ICD-10-CM

## 2020-03-13 DIAGNOSIS — K21.00 GASTROESOPHAGEAL REFLUX DISEASE WITH ESOPHAGITIS: ICD-10-CM

## 2020-03-13 DIAGNOSIS — G89.29 CHRONIC BILATERAL LOW BACK PAIN WITHOUT SCIATICA: ICD-10-CM

## 2020-03-13 DIAGNOSIS — J40 BRONCHITIS: ICD-10-CM

## 2020-03-13 DIAGNOSIS — Z23 NEED FOR VACCINATION: ICD-10-CM

## 2020-03-13 DIAGNOSIS — J44.9 CHRONIC OBSTRUCTIVE PULMONARY DISEASE, UNSPECIFIED COPD TYPE (HCC): ICD-10-CM

## 2020-03-13 DIAGNOSIS — R74.8 ELEVATED LIVER ENZYMES: ICD-10-CM

## 2020-03-13 DIAGNOSIS — Z13.21 ENCOUNTER FOR VITAMIN DEFICIENCY SCREENING: ICD-10-CM

## 2020-03-13 DIAGNOSIS — Z72.0 TOBACCO ABUSE DISORDER: ICD-10-CM

## 2020-03-13 DIAGNOSIS — J30.9 ALLERGIC RHINITIS: ICD-10-CM

## 2020-03-13 DIAGNOSIS — Z13.29 SCREENING FOR THYROID DISORDER: ICD-10-CM

## 2020-03-13 LAB
25(OH)D3 SERPL-MCNC: 28.2 NG/ML (ref 30–100)
CHOLEST SERPL-MCNC: 271 MG/DL (ref 50–200)
HDLC SERPL-MCNC: 65 MG/DL
LDLC SERPL CALC-MCNC: 139 MG/DL (ref 0–100)
NONHDLC SERPL-MCNC: 206 MG/DL
TRIGL SERPL-MCNC: 335 MG/DL
TSH SERPL DL<=0.05 MIU/L-ACNC: 1.93 UIU/ML (ref 0.36–3.74)

## 2020-03-13 PROCEDURE — 90632 HEPA VACCINE ADULT IM: CPT

## 2020-03-13 PROCEDURE — 90472 IMMUNIZATION ADMIN EACH ADD: CPT

## 2020-03-13 PROCEDURE — 99214 OFFICE O/P EST MOD 30 MIN: CPT | Performed by: INTERNAL MEDICINE

## 2020-03-13 PROCEDURE — 80061 LIPID PANEL: CPT | Performed by: INTERNAL MEDICINE

## 2020-03-13 PROCEDURE — 3079F DIAST BP 80-89 MM HG: CPT | Performed by: INTERNAL MEDICINE

## 2020-03-13 PROCEDURE — 1036F TOBACCO NON-USER: CPT | Performed by: INTERNAL MEDICINE

## 2020-03-13 PROCEDURE — 36415 COLL VENOUS BLD VENIPUNCTURE: CPT | Performed by: INTERNAL MEDICINE

## 2020-03-13 PROCEDURE — 90746 HEPB VACCINE 3 DOSE ADULT IM: CPT

## 2020-03-13 PROCEDURE — 82306 VITAMIN D 25 HYDROXY: CPT | Performed by: INTERNAL MEDICINE

## 2020-03-13 PROCEDURE — 84443 ASSAY THYROID STIM HORMONE: CPT | Performed by: INTERNAL MEDICINE

## 2020-03-13 PROCEDURE — 3075F SYST BP GE 130 - 139MM HG: CPT | Performed by: INTERNAL MEDICINE

## 2020-03-13 PROCEDURE — 90471 IMMUNIZATION ADMIN: CPT

## 2020-03-13 RX ORDER — NAPROXEN 500 MG/1
500 TABLET ORAL 2 TIMES DAILY WITH MEALS
Qty: 60 TABLET | Refills: 0 | Status: SHIPPED | OUTPATIENT
Start: 2020-03-13 | End: 2020-04-07

## 2020-03-13 RX ORDER — LANOLIN ALCOHOL/MO/W.PET/CERES
100 CREAM (GRAM) TOPICAL DAILY
Qty: 30 TABLET | Refills: 0 | Status: SHIPPED | OUTPATIENT
Start: 2020-03-13 | End: 2020-12-10 | Stop reason: SDUPTHER

## 2020-03-13 RX ORDER — AZELASTINE 1 MG/ML
SPRAY, METERED NASAL
COMMUNITY
Start: 2019-11-22 | End: 2020-03-13 | Stop reason: SDUPTHER

## 2020-03-13 RX ORDER — ALBUTEROL SULFATE 2.5 MG/3ML
2.5 SOLUTION RESPIRATORY (INHALATION) 3 TIMES DAILY
Qty: 90 VIAL | Refills: 5 | Status: SHIPPED | OUTPATIENT
Start: 2020-03-13

## 2020-03-13 RX ORDER — AZELASTINE 1 MG/ML
2 SPRAY, METERED NASAL DAILY
Qty: 1 BOTTLE | Refills: 1 | Status: SHIPPED | OUTPATIENT
Start: 2020-03-13 | End: 2020-10-01 | Stop reason: SDUPTHER

## 2020-03-13 RX ORDER — OMEPRAZOLE 40 MG/1
40 CAPSULE, DELAYED RELEASE ORAL DAILY
Qty: 90 CAPSULE | Refills: 0 | Status: SHIPPED | OUTPATIENT
Start: 2020-03-13 | End: 2020-07-28

## 2020-03-13 RX ORDER — FLUTICASONE PROPIONATE 50 MCG
2 SPRAY, SUSPENSION (ML) NASAL DAILY
Qty: 16 ML | Refills: 5 | Status: SHIPPED | OUTPATIENT
Start: 2020-03-13 | End: 2020-09-28

## 2020-03-13 RX ORDER — METOPROLOL SUCCINATE 25 MG/1
25 TABLET, EXTENDED RELEASE ORAL DAILY
Qty: 30 TABLET | Refills: 0 | Status: SHIPPED | OUTPATIENT
Start: 2020-03-13 | End: 2020-04-20

## 2020-03-13 RX ORDER — ALBUTEROL SULFATE 90 UG/1
2 AEROSOL, METERED RESPIRATORY (INHALATION) EVERY 6 HOURS PRN
Qty: 8.5 INHALER | Refills: 0 | Status: SHIPPED | OUTPATIENT
Start: 2020-03-13 | End: 2020-04-27

## 2020-03-13 RX ORDER — FOLIC ACID 1 MG/1
1000 TABLET ORAL DAILY
Qty: 30 TABLET | Refills: 1 | Status: SHIPPED | OUTPATIENT
Start: 2020-03-13 | End: 2020-05-11

## 2020-03-13 NOTE — PROGRESS NOTES
Assessment/Plan:         Diagnoses and all orders for this visit:    Screening for lipid disorders    Gastroesophageal reflux disease with esophagitis  -     omeprazole (PriLOSEC) 40 MG capsule; Take 1 capsule (40 mg total) by mouth daily    Chronic bilateral low back pain without sciatica  -     naproxen (NAPROSYN) 500 mg tablet; Take 1 tablet (500 mg total) by mouth 2 (two) times a day with meals    Essential hypertension  -     metoprolol succinate (TOPROL-XL) 25 mg 24 hr tablet; Take 1 tablet (25 mg total) by mouth daily  -     Lipid panel    Elevated liver enzymes  -     folic acid (FOLVITE) 1 mg tablet; Take 1 tablet (1,000 mcg total) by mouth daily  -     thiamine (CVS B-1) 100 MG tablet; Take 1 tablet (100 mg total) by mouth daily    Uncontrolled moderate persistent asthma  -     fluticasone-umeclidinium-vilanterol (TRELEGY) 100-62 5-25 MCG/INH inhaler; Inhale 1 puff daily Rinse mouth after use  Allergic rhinitis  -     fluticasone (FLONASE) 50 mcg/act nasal spray; 2 sprays into each nostril daily  -     azelastine (ASTELIN) 0 1 % nasal spray; 2 sprays into each nostril daily Use in each nostril as directed    Tobacco abuse disorder  -     nicotine (CVS Nicotine) 7 mg/24hr TD 24 hr patch; Place 1 patch on the skin daily    Bronchitis  -     albuterol (PROVENTIL HFA,VENTOLIN HFA) 90 mcg/act inhaler; Inhale 2 puffs every 6 (six) hours as needed for wheezing    Chronic obstructive pulmonary disease, unspecified COPD type (HCC)  -     albuterol (2 5 mg/3 mL) 0 083 % nebulizer solution; Take 1 vial (2 5 mg total) by nebulization 3 (three) times a day    Rash  -     Ambulatory referral to Dermatology;  Future    Screening for thyroid disorder  -     TSH, 3rd generation with Free T4 reflex    Encounter for vitamin deficiency screening  -     Vitamin D 25 hydroxy    Need for vaccination  -     HEPATITIS B VACCINE ADULT IM  -     HEPATITIS A VACCINE ADULT IM    Other orders  -     Discontinue: azelastine (ASTELIN) 0 1 % nasal spray; USE 1-2 SPRAYS IN EACH NOSTRIL DAILY AS DIRECTED        Subjective:      Patient ID: Heather Baumann is a 51 y o  female  HPI   patient history of  Chronic bronchitis asthma hep C chronic alcohol tobacco dependency which is in remission dyspepsia  Hypertension is here to follow-up on chronic medical problems  Patient reports she has not been smoking but has been occasionally using marijuana  She has not followed up with pulmonologist but would like to have refills  She is due for lab studies  Her mammogram and pelvic ultrasound is still pending  She did not go for CT scan of the chest as ordered by Pulmonary  The following portions of the patient's history were reviewed and updated as appropriate: allergies, current medications, past family history, past medical history, past social history, past surgical history and problem list     Review of Systems   Constitutional: Negative for chills and fever  Respiratory: Positive for cough (Chronic cough)  Negative for shortness of breath  Cardiovascular: Negative for chest pain, palpitations and leg swelling  Gastrointestinal: Negative for abdominal pain, blood in stool and constipation  Skin: Positive for rash  Neurological: Negative for dizziness  Objective:      /82 (BP Location: Left arm, Patient Position: Sitting, Cuff Size: Standard)   Pulse (!) 113   Temp (!) 96 6 °F (35 9 °C)   Wt 47 6 kg (105 lb)   SpO2 97%   BMI 21 57 kg/m²          Physical Exam   Eyes: Conjunctivae are normal    Cardiovascular: Normal rate, regular rhythm and normal heart sounds  No murmur heard  Pulmonary/Chest: She has no wheezes  She has no rales  Decreased breath sounds   Abdominal: Soft  Bowel sounds are normal  She exhibits no distension and no mass  There is no tenderness  There is no rebound and no guarding  Musculoskeletal: She exhibits no edema  Neurological: She is alert     Skin: Rash ( geometric rash bilateral plantar surfaces mildly scaly) noted

## 2020-03-17 ENCOUNTER — TELEPHONE (OUTPATIENT)
Dept: PULMONOLOGY | Facility: CLINIC | Age: 50
End: 2020-03-17

## 2020-03-17 ENCOUNTER — TELEPHONE (OUTPATIENT)
Dept: FAMILY MEDICINE CLINIC | Facility: CLINIC | Age: 50
End: 2020-03-17

## 2020-03-17 NOTE — TELEPHONE ENCOUNTER
Caregiver called requesting to Speak with Gust Rinne, I told her Gust Rinne was not available at the time but she said she wanted a call back from [facility] amelie Boggsld her that I would give her the message and that she would call her back asap  I told her that the medication was approved on 3/13/20 and she said she knew that but she wanted to know if Shaunna did the PA or not in [facility] worth again told her that her med was approved but she still insist in getting a call back

## 2020-03-24 ENCOUNTER — TELEPHONE (OUTPATIENT)
Dept: GASTROENTEROLOGY | Facility: AMBULARY SURGERY CENTER | Age: 50
End: 2020-03-24

## 2020-03-24 DIAGNOSIS — B18.2 CHRONIC HEPATITIS C WITHOUT HEPATIC COMA (HCC): Primary | ICD-10-CM

## 2020-03-24 NOTE — TELEPHONE ENCOUNTER
WHIT Ruelas,     Pre authorization needs to be extended because Steffanie waited 2 months between medication delivery and starting the medication  [place] from [facility] is calling to find out if we can get an pre auth extension for patient's Anshul Escalera  I called and spoke to [facility] and he advised that [facility] should call the insurance  Informed [facility] to give Abram Houston a call  Westley Schulte will call them and call me back  Called [employer] which does pre-auths for [employer]  The cannot extend dates on pre-auth that is more than 2 month old  I was faxed the form to complete   I completed form, reprinted labs and office visit and faxed it to [address] [place] [address] at 12:05 PM

## 2020-03-25 NOTE — TELEPHONE ENCOUNTER
Pre authorization extension - approved! [facility] notified also by [employer]  Will call Steffanie after 11:00 today (her standing request)

## 2020-04-01 ENCOUNTER — TELEPHONE (OUTPATIENT)
Dept: GASTROENTEROLOGY | Facility: AMBULARY SURGERY CENTER | Age: 50
End: 2020-04-01

## 2020-04-02 NOTE — TELEPHONE ENCOUNTER
Den Lombardi has been filled and will be shipped to [facility]  She will be taking it to Steffanie

## 2020-04-02 NOTE — TELEPHONE ENCOUNTER
Prudence Baker from [address] returned your call stating she took care of the insurance issue this morning   Prudence Baker states they are just having problems getting in touch w/pt

## 2020-04-07 DIAGNOSIS — G89.29 CHRONIC BILATERAL LOW BACK PAIN WITHOUT SCIATICA: ICD-10-CM

## 2020-04-07 DIAGNOSIS — M54.50 CHRONIC BILATERAL LOW BACK PAIN WITHOUT SCIATICA: ICD-10-CM

## 2020-04-07 RX ORDER — NAPROXEN 500 MG/1
TABLET ORAL
Qty: 60 TABLET | Refills: 0 | Status: SHIPPED | OUTPATIENT
Start: 2020-04-07 | End: 2020-05-11

## 2020-04-11 DIAGNOSIS — Z72.0 TOBACCO ABUSE DISORDER: ICD-10-CM

## 2020-04-19 DIAGNOSIS — I10 ESSENTIAL HYPERTENSION: ICD-10-CM

## 2020-04-20 RX ORDER — METOPROLOL SUCCINATE 25 MG/1
TABLET, EXTENDED RELEASE ORAL
Qty: 30 TABLET | Refills: 0 | Status: SHIPPED | OUTPATIENT
Start: 2020-04-20 | End: 2020-05-26

## 2020-04-26 DIAGNOSIS — J40 BRONCHITIS: ICD-10-CM

## 2020-04-27 RX ORDER — ALBUTEROL SULFATE 90 UG/1
AEROSOL, METERED RESPIRATORY (INHALATION)
Qty: 8.5 INHALER | Refills: 0 | Status: SHIPPED | OUTPATIENT
Start: 2020-04-27 | End: 2020-07-06

## 2020-05-08 DIAGNOSIS — G89.29 CHRONIC BILATERAL LOW BACK PAIN WITHOUT SCIATICA: ICD-10-CM

## 2020-05-08 DIAGNOSIS — R74.8 ELEVATED LIVER ENZYMES: ICD-10-CM

## 2020-05-08 DIAGNOSIS — M54.50 CHRONIC BILATERAL LOW BACK PAIN WITHOUT SCIATICA: ICD-10-CM

## 2020-05-09 DIAGNOSIS — Z72.0 TOBACCO ABUSE DISORDER: ICD-10-CM

## 2020-05-11 RX ORDER — NAPROXEN 500 MG/1
500 TABLET ORAL DAILY PRN
Qty: 60 TABLET | Refills: 0 | Status: SHIPPED | OUTPATIENT
Start: 2020-05-11 | End: 2020-07-06

## 2020-05-11 RX ORDER — FOLIC ACID 1 MG/1
TABLET ORAL
Qty: 30 TABLET | Refills: 1 | Status: SHIPPED | OUTPATIENT
Start: 2020-05-11 | End: 2020-07-06

## 2020-05-24 DIAGNOSIS — I10 ESSENTIAL HYPERTENSION: ICD-10-CM

## 2020-05-26 RX ORDER — METOPROLOL SUCCINATE 25 MG/1
TABLET, EXTENDED RELEASE ORAL
Qty: 30 TABLET | Refills: 0 | Status: SHIPPED | OUTPATIENT
Start: 2020-05-26 | End: 2020-06-26

## 2020-06-11 DIAGNOSIS — Z72.0 TOBACCO ABUSE DISORDER: ICD-10-CM

## 2020-06-12 ENCOUNTER — TELEPHONE (OUTPATIENT)
Dept: GASTROENTEROLOGY | Facility: MEDICAL CENTER | Age: 50
End: 2020-06-12

## 2020-06-26 DIAGNOSIS — I10 ESSENTIAL HYPERTENSION: ICD-10-CM

## 2020-06-26 RX ORDER — METOPROLOL SUCCINATE 25 MG/1
TABLET, EXTENDED RELEASE ORAL
Qty: 30 TABLET | Refills: 0 | Status: SHIPPED | OUTPATIENT
Start: 2020-06-26 | End: 2020-07-28

## 2020-07-03 DIAGNOSIS — M54.50 CHRONIC BILATERAL LOW BACK PAIN WITHOUT SCIATICA: ICD-10-CM

## 2020-07-03 DIAGNOSIS — R74.8 ELEVATED LIVER ENZYMES: ICD-10-CM

## 2020-07-03 DIAGNOSIS — G89.29 CHRONIC BILATERAL LOW BACK PAIN WITHOUT SCIATICA: ICD-10-CM

## 2020-07-05 DIAGNOSIS — J40 BRONCHITIS: ICD-10-CM

## 2020-07-06 RX ORDER — ALBUTEROL SULFATE 90 UG/1
AEROSOL, METERED RESPIRATORY (INHALATION)
Qty: 8.5 INHALER | Refills: 0 | Status: SHIPPED | OUTPATIENT
Start: 2020-07-06 | End: 2020-08-03

## 2020-07-06 RX ORDER — NAPROXEN 500 MG/1
500 TABLET ORAL DAILY PRN
Qty: 30 TABLET | Refills: 1 | Status: SHIPPED | OUTPATIENT
Start: 2020-07-06 | End: 2020-10-01 | Stop reason: SDUPTHER

## 2020-07-06 RX ORDER — FOLIC ACID 1 MG/1
TABLET ORAL
Qty: 30 TABLET | Refills: 1 | Status: SHIPPED | OUTPATIENT
Start: 2020-07-06 | End: 2020-08-25

## 2020-07-17 ENCOUNTER — TELEPHONE (OUTPATIENT)
Dept: GASTROENTEROLOGY | Facility: AMBULARY SURGERY CENTER | Age: 50
End: 2020-07-17

## 2020-07-17 NOTE — TELEPHONE ENCOUNTER
Jelani Kennedy will take Steffanie to get the SVR done after the 27th - she will be on vacation before that  Shira Hernandez

## 2020-07-17 NOTE — TELEPHONE ENCOUNTER
Left message on Lashonda's voicemail that anytime bewteen 21 - 27 is ok  It is not ideal to get the SVR lab done before 12 weeks after end of treatment

## 2020-07-17 NOTE — TELEPHONE ENCOUNTER
Patients GI provider:  Dr Sai Jeffrey    Number to return call: (  [phone]    Reason for call: Pts friend darian called to ask if yoav can have her labs done the week of 7-27th instead of July 21st     Scheduled procedure/appointment date if applicable: Apt/procedure  na

## 2020-07-28 DIAGNOSIS — K21.00 GASTROESOPHAGEAL REFLUX DISEASE WITH ESOPHAGITIS: ICD-10-CM

## 2020-07-28 DIAGNOSIS — I10 ESSENTIAL HYPERTENSION: ICD-10-CM

## 2020-07-28 RX ORDER — OMEPRAZOLE 40 MG/1
CAPSULE, DELAYED RELEASE ORAL
Qty: 30 CAPSULE | Refills: 2 | Status: SHIPPED | OUTPATIENT
Start: 2020-07-28 | End: 2020-10-01 | Stop reason: SDUPTHER

## 2020-07-28 RX ORDER — METOPROLOL SUCCINATE 25 MG/1
TABLET, EXTENDED RELEASE ORAL
Qty: 30 TABLET | Refills: 0 | Status: SHIPPED | OUTPATIENT
Start: 2020-07-28 | End: 2020-08-26

## 2020-07-30 ENCOUNTER — TELEPHONE (OUTPATIENT)
Dept: GASTROENTEROLOGY | Facility: MEDICAL CENTER | Age: 50
End: 2020-07-30

## 2020-07-30 NOTE — TELEPHONE ENCOUNTER
Arthur De La Rosa, patient's advocate, called today to let me know that she had everything set up to pick Steffanie up and take her for her SVR on 7/28, but Steffanie called her in the middle of the night and said she was not going to get her lab done till 11/4  I thanked Jeffrey Ivey for updating me  She also said Lukas Tamayo is shutting Jeffrey Ivey out of her life  Jeffrey Ivey was helping her get SSI and now that has all fallen apart

## 2020-07-30 NOTE — TELEPHONE ENCOUNTER
Dahiana Mcfadden called back to speak with nurse [facility]    Call back # [phone]

## 2020-07-30 NOTE — TELEPHONE ENCOUNTER
Darryn Blunt, patient's advocate, called today to let me know that she had everything set up to pick Steffanie up and take her for her SVR on 7/28, but Steffanie called her in the middle of the night and said she was not going to get her lab done till 11/4       I thanked Judith Borges for updating me  She also said Jean Claude Peng is shutting Judith Borges out of her life  Judith Borges was helping her get SSI and now that has all fallen apart

## 2020-07-31 NOTE — TELEPHONE ENCOUNTER
I am sorry to hear that  If she has completed the treatment, I guess it is up to her for follow up labs  November should be fine

## 2020-08-03 DIAGNOSIS — J40 BRONCHITIS: ICD-10-CM

## 2020-08-03 RX ORDER — ALBUTEROL SULFATE 90 UG/1
AEROSOL, METERED RESPIRATORY (INHALATION)
Qty: 8.5 INHALER | Refills: 0 | Status: SHIPPED | OUTPATIENT
Start: 2020-08-03 | End: 2020-08-24

## 2020-08-22 DIAGNOSIS — J40 BRONCHITIS: ICD-10-CM

## 2020-08-23 DIAGNOSIS — R74.8 ELEVATED LIVER ENZYMES: ICD-10-CM

## 2020-08-23 DIAGNOSIS — G89.29 CHRONIC BILATERAL LOW BACK PAIN WITHOUT SCIATICA: ICD-10-CM

## 2020-08-23 DIAGNOSIS — M54.50 CHRONIC BILATERAL LOW BACK PAIN WITHOUT SCIATICA: ICD-10-CM

## 2020-08-24 RX ORDER — ALBUTEROL SULFATE 90 UG/1
AEROSOL, METERED RESPIRATORY (INHALATION)
Qty: 8.5 INHALER | Refills: 0 | Status: SHIPPED | OUTPATIENT
Start: 2020-08-24 | End: 2020-09-14

## 2020-08-25 RX ORDER — FOLIC ACID 1 MG/1
TABLET ORAL
Qty: 30 TABLET | Refills: 1 | Status: SHIPPED | OUTPATIENT
Start: 2020-08-25 | End: 2020-10-01 | Stop reason: SDUPTHER

## 2020-08-25 RX ORDER — NAPROXEN 500 MG/1
500 TABLET ORAL DAILY PRN
Qty: 30 TABLET | Refills: 1 | OUTPATIENT
Start: 2020-08-25

## 2020-08-26 DIAGNOSIS — I10 ESSENTIAL HYPERTENSION: ICD-10-CM

## 2020-08-26 RX ORDER — METOPROLOL SUCCINATE 25 MG/1
TABLET, EXTENDED RELEASE ORAL
Qty: 30 TABLET | Refills: 0 | Status: SHIPPED | OUTPATIENT
Start: 2020-08-26 | End: 2020-09-28

## 2020-09-12 DIAGNOSIS — J40 BRONCHITIS: ICD-10-CM

## 2020-09-14 RX ORDER — ALBUTEROL SULFATE 90 UG/1
AEROSOL, METERED RESPIRATORY (INHALATION)
Qty: 8.5 INHALER | Refills: 0 | Status: SHIPPED | OUTPATIENT
Start: 2020-09-14 | End: 2020-10-01 | Stop reason: SDUPTHER

## 2020-09-26 DIAGNOSIS — I10 ESSENTIAL HYPERTENSION: ICD-10-CM

## 2020-09-28 DIAGNOSIS — J30.9 ALLERGIC RHINITIS: ICD-10-CM

## 2020-09-28 RX ORDER — METOPROLOL SUCCINATE 25 MG/1
TABLET, EXTENDED RELEASE ORAL
Qty: 30 TABLET | Refills: 0 | Status: SHIPPED | OUTPATIENT
Start: 2020-09-28 | End: 2020-10-01 | Stop reason: SDUPTHER

## 2020-09-28 RX ORDER — FLUTICASONE PROPIONATE 50 MCG
SPRAY, SUSPENSION (ML) NASAL
Qty: 16 ML | Refills: 5 | Status: SHIPPED | OUTPATIENT
Start: 2020-09-28 | End: 2020-10-01 | Stop reason: SDUPTHER

## 2020-10-01 ENCOUNTER — OFFICE VISIT (OUTPATIENT)
Dept: FAMILY MEDICINE CLINIC | Facility: CLINIC | Age: 50
End: 2020-10-01
Payer: COMMERCIAL

## 2020-10-01 VITALS
BODY MASS INDEX: 19.76 KG/M2 | WEIGHT: 98 LBS | OXYGEN SATURATION: 98 % | DIASTOLIC BLOOD PRESSURE: 68 MMHG | SYSTOLIC BLOOD PRESSURE: 120 MMHG | HEIGHT: 59 IN | TEMPERATURE: 98 F | HEART RATE: 90 BPM

## 2020-10-01 DIAGNOSIS — I10 ESSENTIAL HYPERTENSION: Primary | ICD-10-CM

## 2020-10-01 DIAGNOSIS — Z00.00 ANNUAL PHYSICAL EXAM: ICD-10-CM

## 2020-10-01 DIAGNOSIS — E78.2 MIXED HYPERLIPIDEMIA: ICD-10-CM

## 2020-10-01 DIAGNOSIS — J30.9 ALLERGIC RHINITIS: ICD-10-CM

## 2020-10-01 DIAGNOSIS — K21.00 GASTROESOPHAGEAL REFLUX DISEASE WITH ESOPHAGITIS: ICD-10-CM

## 2020-10-01 DIAGNOSIS — F41.8 DEPRESSION WITH ANXIETY: ICD-10-CM

## 2020-10-01 DIAGNOSIS — J40 BRONCHITIS: ICD-10-CM

## 2020-10-01 DIAGNOSIS — G89.29 CHRONIC BILATERAL LOW BACK PAIN WITHOUT SCIATICA: ICD-10-CM

## 2020-10-01 DIAGNOSIS — J45.40 UNCONTROLLED MODERATE PERSISTENT ASTHMA: ICD-10-CM

## 2020-10-01 DIAGNOSIS — R74.8 ELEVATED LIVER ENZYMES: ICD-10-CM

## 2020-10-01 DIAGNOSIS — Z23 ENCOUNTER FOR IMMUNIZATION: ICD-10-CM

## 2020-10-01 DIAGNOSIS — Z12.31 SCREENING MAMMOGRAM, ENCOUNTER FOR: Primary | ICD-10-CM

## 2020-10-01 DIAGNOSIS — I10 ESSENTIAL HYPERTENSION: ICD-10-CM

## 2020-10-01 DIAGNOSIS — M54.50 CHRONIC BILATERAL LOW BACK PAIN WITHOUT SCIATICA: ICD-10-CM

## 2020-10-01 PROCEDURE — 90471 IMMUNIZATION ADMIN: CPT

## 2020-10-01 PROCEDURE — 99396 PREV VISIT EST AGE 40-64: CPT | Performed by: INTERNAL MEDICINE

## 2020-10-01 PROCEDURE — 90686 IIV4 VACC NO PRSV 0.5 ML IM: CPT

## 2020-10-01 RX ORDER — METOPROLOL SUCCINATE 25 MG/1
25 TABLET, EXTENDED RELEASE ORAL DAILY
Qty: 30 TABLET | Refills: 0 | Status: SHIPPED | OUTPATIENT
Start: 2020-10-01 | End: 2020-12-10 | Stop reason: SDUPTHER

## 2020-10-01 RX ORDER — NAPROXEN 500 MG/1
500 TABLET ORAL DAILY PRN
Qty: 30 TABLET | Refills: 1 | Status: SHIPPED | OUTPATIENT
Start: 2020-10-01 | End: 2020-12-21

## 2020-10-01 RX ORDER — AZELASTINE 1 MG/ML
2 SPRAY, METERED NASAL DAILY
Qty: 1 BOTTLE | Refills: 1 | Status: SHIPPED | OUTPATIENT
Start: 2020-10-01 | End: 2020-12-21

## 2020-10-01 RX ORDER — ALBUTEROL SULFATE 90 UG/1
2 AEROSOL, METERED RESPIRATORY (INHALATION) EVERY 6 HOURS PRN
Qty: 8.5 INHALER | Refills: 0 | Status: SHIPPED | OUTPATIENT
Start: 2020-10-01 | End: 2020-12-10 | Stop reason: SDUPTHER

## 2020-10-01 RX ORDER — OMEPRAZOLE 40 MG/1
40 CAPSULE, DELAYED RELEASE ORAL DAILY
Qty: 30 CAPSULE | Refills: 2 | Status: SHIPPED | OUTPATIENT
Start: 2020-10-01 | End: 2020-12-10 | Stop reason: SDUPTHER

## 2020-10-01 RX ORDER — FOLIC ACID 1 MG/1
1000 TABLET ORAL DAILY
Qty: 30 TABLET | Refills: 1 | Status: SHIPPED | OUTPATIENT
Start: 2020-10-01 | End: 2020-12-10 | Stop reason: SDUPTHER

## 2020-10-01 RX ORDER — FLUTICASONE PROPIONATE 50 MCG
1 SPRAY, SUSPENSION (ML) NASAL DAILY
Qty: 16 ML | Refills: 5 | Status: SHIPPED | OUTPATIENT
Start: 2020-10-01 | End: 2021-06-25

## 2020-10-05 ENCOUNTER — TELEPHONE (OUTPATIENT)
Dept: PULMONOLOGY | Facility: CLINIC | Age: 50
End: 2020-10-05

## 2020-10-05 ENCOUNTER — TELEPHONE (OUTPATIENT)
Dept: FAMILY MEDICINE CLINIC | Facility: CLINIC | Age: 50
End: 2020-10-05

## 2020-10-05 ENCOUNTER — TELEPHONE (OUTPATIENT)
Dept: PSYCHIATRY | Facility: CLINIC | Age: 50
End: 2020-10-05

## 2020-10-06 ENCOUNTER — OFFICE VISIT (OUTPATIENT)
Dept: OBGYN CLINIC | Facility: MEDICAL CENTER | Age: 50
End: 2020-10-06
Payer: COMMERCIAL

## 2020-10-06 ENCOUNTER — APPOINTMENT (OUTPATIENT)
Dept: LAB | Facility: MEDICAL CENTER | Age: 50
End: 2020-10-06
Payer: COMMERCIAL

## 2020-10-06 VITALS
SYSTOLIC BLOOD PRESSURE: 120 MMHG | DIASTOLIC BLOOD PRESSURE: 70 MMHG | BODY MASS INDEX: 19.64 KG/M2 | TEMPERATURE: 97.2 F | WEIGHT: 97.4 LBS | HEIGHT: 59 IN

## 2020-10-06 DIAGNOSIS — Z01.419 ENCNTR FOR GYN EXAM (GENERAL) (ROUTINE) W/O ABN FINDINGS: Primary | ICD-10-CM

## 2020-10-06 DIAGNOSIS — B18.2 CHRONIC HEPATITIS C WITHOUT HEPATIC COMA (HCC): ICD-10-CM

## 2020-10-06 DIAGNOSIS — R23.2 HOT FLASHES: ICD-10-CM

## 2020-10-06 PROCEDURE — 3078F DIAST BP <80 MM HG: CPT | Performed by: NURSE PRACTITIONER

## 2020-10-06 PROCEDURE — 99396 PREV VISIT EST AGE 40-64: CPT | Performed by: NURSE PRACTITIONER

## 2020-10-06 PROCEDURE — 36415 COLL VENOUS BLD VENIPUNCTURE: CPT

## 2020-10-06 PROCEDURE — 87522 HEPATITIS C REVRS TRNSCRPJ: CPT

## 2020-10-06 PROCEDURE — 1036F TOBACCO NON-USER: CPT | Performed by: NURSE PRACTITIONER

## 2020-10-06 RX ORDER — PAROXETINE 7.5 MG/1
1 CAPSULE ORAL DAILY
Qty: 90 CAPSULE | Refills: 0 | Status: SHIPPED | OUTPATIENT
Start: 2020-10-06 | End: 2020-10-28

## 2020-10-08 DIAGNOSIS — J45.40 UNCONTROLLED MODERATE PERSISTENT ASTHMA: ICD-10-CM

## 2020-10-09 LAB
HCV RNA SERPL NAA+PROBE-ACNC: NORMAL IU/ML
TEST INFORMATION: NORMAL

## 2020-10-09 RX ORDER — FLUTICASONE FUROATE, UMECLIDINIUM BROMIDE AND VILANTEROL TRIFENATATE 100; 62.5; 25 UG/1; UG/1; UG/1
POWDER RESPIRATORY (INHALATION)
Qty: 1 INHALER | Refills: 3 | Status: SHIPPED | OUTPATIENT
Start: 2020-10-09 | End: 2020-11-13 | Stop reason: SDUPTHER

## 2020-10-27 ENCOUNTER — TELEPHONE (OUTPATIENT)
Dept: OBGYN CLINIC | Facility: MEDICAL CENTER | Age: 50
End: 2020-10-27

## 2020-10-28 DIAGNOSIS — Z78.0 MENOPAUSE: Primary | ICD-10-CM

## 2020-10-28 RX ORDER — PAROXETINE 10 MG/1
10 TABLET, FILM COATED ORAL DAILY
Qty: 90 TABLET | Refills: 0 | Status: SHIPPED | OUTPATIENT
Start: 2020-10-28 | End: 2020-12-10 | Stop reason: SDUPTHER

## 2020-11-10 ENCOUNTER — TELEPHONE (OUTPATIENT)
Dept: OBGYN CLINIC | Facility: MEDICAL CENTER | Age: 50
End: 2020-11-10

## 2020-11-10 DIAGNOSIS — J45.40 UNCONTROLLED MODERATE PERSISTENT ASTHMA: ICD-10-CM

## 2020-11-12 ENCOUNTER — TELEPHONE (OUTPATIENT)
Dept: FAMILY MEDICINE CLINIC | Facility: CLINIC | Age: 50
End: 2020-11-12

## 2020-11-13 DIAGNOSIS — J45.40 UNCONTROLLED MODERATE PERSISTENT ASTHMA: ICD-10-CM

## 2020-11-13 RX ORDER — FLUTICASONE FUROATE, UMECLIDINIUM BROMIDE AND VILANTEROL TRIFENATATE 100; 62.5; 25 UG/1; UG/1; UG/1
POWDER RESPIRATORY (INHALATION)
Qty: 1 INHALER | Refills: 3 | Status: CANCELLED | OUTPATIENT
Start: 2020-11-13

## 2020-11-17 RX ORDER — FLUTICASONE FUROATE, UMECLIDINIUM BROMIDE AND VILANTEROL TRIFENATATE 100; 62.5; 25 UG/1; UG/1; UG/1
1 POWDER RESPIRATORY (INHALATION) DAILY
Qty: 1 INHALER | Refills: 3 | Status: SHIPPED | OUTPATIENT
Start: 2020-11-17 | End: 2021-08-18

## 2020-12-10 ENCOUNTER — TELEPHONE (OUTPATIENT)
Dept: PULMONOLOGY | Facility: CLINIC | Age: 50
End: 2020-12-10

## 2020-12-10 DIAGNOSIS — J40 BRONCHITIS: ICD-10-CM

## 2020-12-10 DIAGNOSIS — K21.00 GASTROESOPHAGEAL REFLUX DISEASE WITH ESOPHAGITIS: ICD-10-CM

## 2020-12-10 DIAGNOSIS — I10 ESSENTIAL HYPERTENSION: ICD-10-CM

## 2020-12-10 DIAGNOSIS — R74.8 ELEVATED LIVER ENZYMES: ICD-10-CM

## 2020-12-10 DIAGNOSIS — Z78.0 MENOPAUSE: ICD-10-CM

## 2020-12-10 RX ORDER — LANOLIN ALCOHOL/MO/W.PET/CERES
100 CREAM (GRAM) TOPICAL DAILY
Qty: 30 TABLET | Refills: 0 | Status: SHIPPED | OUTPATIENT
Start: 2020-12-10

## 2020-12-10 RX ORDER — OMEPRAZOLE 40 MG/1
40 CAPSULE, DELAYED RELEASE ORAL DAILY
Qty: 30 CAPSULE | Refills: 2 | Status: SHIPPED | OUTPATIENT
Start: 2020-12-10 | End: 2021-05-03

## 2020-12-10 RX ORDER — FOLIC ACID 1 MG/1
1000 TABLET ORAL DAILY
Qty: 30 TABLET | Refills: 1 | Status: SHIPPED | OUTPATIENT
Start: 2020-12-10 | End: 2021-04-07

## 2020-12-10 RX ORDER — ALBUTEROL SULFATE 90 UG/1
2 AEROSOL, METERED RESPIRATORY (INHALATION) EVERY 6 HOURS PRN
Qty: 8.5 INHALER | Refills: 0 | Status: SHIPPED | OUTPATIENT
Start: 2020-12-10 | End: 2021-01-15

## 2020-12-10 RX ORDER — METOPROLOL SUCCINATE 25 MG/1
25 TABLET, EXTENDED RELEASE ORAL DAILY
Qty: 30 TABLET | Refills: 0 | Status: SHIPPED | OUTPATIENT
Start: 2020-12-10 | End: 2021-01-14

## 2020-12-10 RX ORDER — PAROXETINE 10 MG/1
10 TABLET, FILM COATED ORAL DAILY
Qty: 90 TABLET | Refills: 0 | Status: SHIPPED | OUTPATIENT
Start: 2020-12-10 | End: 2021-05-03

## 2020-12-19 DIAGNOSIS — J30.9 ALLERGIC RHINITIS: ICD-10-CM

## 2020-12-19 DIAGNOSIS — G89.29 CHRONIC BILATERAL LOW BACK PAIN WITHOUT SCIATICA: ICD-10-CM

## 2020-12-19 DIAGNOSIS — M54.50 CHRONIC BILATERAL LOW BACK PAIN WITHOUT SCIATICA: ICD-10-CM

## 2020-12-21 RX ORDER — AZELASTINE 1 MG/ML
2 SPRAY, METERED NASAL DAILY
Qty: 1 BOTTLE | Refills: 1 | Status: SHIPPED | OUTPATIENT
Start: 2020-12-21

## 2020-12-21 RX ORDER — NAPROXEN 500 MG/1
500 TABLET ORAL DAILY PRN
Qty: 30 TABLET | Refills: 1 | Status: SHIPPED | OUTPATIENT
Start: 2020-12-21 | End: 2021-02-09

## 2021-01-14 DIAGNOSIS — I10 ESSENTIAL HYPERTENSION: ICD-10-CM

## 2021-01-14 RX ORDER — METOPROLOL SUCCINATE 25 MG/1
TABLET, EXTENDED RELEASE ORAL
Qty: 30 TABLET | Refills: 0 | Status: SHIPPED | OUTPATIENT
Start: 2021-01-14 | End: 2021-02-08

## 2021-01-15 DIAGNOSIS — J40 BRONCHITIS: ICD-10-CM

## 2021-01-15 RX ORDER — ALBUTEROL SULFATE 90 UG/1
AEROSOL, METERED RESPIRATORY (INHALATION)
Qty: 8.5 INHALER | Refills: 0 | Status: SHIPPED | OUTPATIENT
Start: 2021-01-15 | End: 2021-03-25

## 2021-01-19 NOTE — TELEPHONE ENCOUNTER
SW/CLOEVR Discharge Plan  Informed patient is ready for discharge. Patient’s discharge destination is Home/apartment with Services/Support- [facility]- arranged by unit RN CLOVER. Patient to be picked up by family.  Patient/interested person has been counseled for post hospitalization care.  Patient agrees and understands goals and plan. Initial implementation of the patient’s discharge plan has been arranged, including any devices/equipment needed for discharge. Discharge plan communicated to MD, RN, MOSHE, CLOVER, DAMION and Receiving Facility/Agency.        Spoke to patient's care giver Sofia Geller  She called to  schedule an appointment for Steffanie  I informed her that she has a 4/25 appt scheduled  Sofia Geller expressed concern that this time frame is too long and requested a sooner appt  I explained that this is an appropriate time frame and as per Dr Ortiz Myers note she should focus on sobriety and see her in a few months  She will reevaluate sobriety and for treatment at appointment  She verbalized understanding and will call with any other concerns

## 2021-02-08 DIAGNOSIS — I10 ESSENTIAL HYPERTENSION: ICD-10-CM

## 2021-02-08 RX ORDER — METOPROLOL SUCCINATE 25 MG/1
TABLET, EXTENDED RELEASE ORAL
Qty: 90 TABLET | Refills: 0 | Status: SHIPPED | OUTPATIENT
Start: 2021-02-08 | End: 2021-04-07

## 2021-02-09 DIAGNOSIS — G89.29 CHRONIC BILATERAL LOW BACK PAIN WITHOUT SCIATICA: ICD-10-CM

## 2021-02-09 DIAGNOSIS — M54.50 CHRONIC BILATERAL LOW BACK PAIN WITHOUT SCIATICA: ICD-10-CM

## 2021-02-09 RX ORDER — NAPROXEN 500 MG/1
500 TABLET ORAL DAILY PRN
Qty: 30 TABLET | Refills: 1 | Status: SHIPPED | OUTPATIENT
Start: 2021-02-09 | End: 2021-04-06

## 2021-03-25 DIAGNOSIS — J40 BRONCHITIS: ICD-10-CM

## 2021-03-25 RX ORDER — ALBUTEROL SULFATE 90 UG/1
AEROSOL, METERED RESPIRATORY (INHALATION)
Qty: 8.5 INHALER | Refills: 0 | Status: SHIPPED | OUTPATIENT
Start: 2021-03-25 | End: 2021-05-24

## 2021-04-01 ENCOUNTER — TELEPHONE (OUTPATIENT)
Dept: FAMILY MEDICINE CLINIC | Facility: CLINIC | Age: 51
End: 2021-04-01

## 2021-04-01 NOTE — TELEPHONE ENCOUNTER
Call Jule Baumgarten pt advocate lm that pt was scheduled for Covid vaccine on 04/05/21 at [facility] 2:50pm

## 2021-04-06 DIAGNOSIS — I10 ESSENTIAL HYPERTENSION: ICD-10-CM

## 2021-04-06 DIAGNOSIS — M54.50 CHRONIC BILATERAL LOW BACK PAIN WITHOUT SCIATICA: ICD-10-CM

## 2021-04-06 DIAGNOSIS — G89.29 CHRONIC BILATERAL LOW BACK PAIN WITHOUT SCIATICA: ICD-10-CM

## 2021-04-06 DIAGNOSIS — R74.8 ELEVATED LIVER ENZYMES: ICD-10-CM

## 2021-04-06 RX ORDER — NAPROXEN 500 MG/1
500 TABLET ORAL DAILY PRN
Qty: 30 TABLET | Refills: 1 | Status: SHIPPED | OUTPATIENT
Start: 2021-04-06 | End: 2021-06-01

## 2021-04-07 RX ORDER — FOLIC ACID 1 MG/1
TABLET ORAL
Qty: 30 TABLET | Refills: 1 | Status: SHIPPED | OUTPATIENT
Start: 2021-04-07 | End: 2021-07-26

## 2021-04-07 RX ORDER — METOPROLOL SUCCINATE 25 MG/1
TABLET, EXTENDED RELEASE ORAL
Qty: 90 TABLET | Refills: 0 | Status: SHIPPED | OUTPATIENT
Start: 2021-04-07 | End: 2021-07-27

## 2021-05-01 DIAGNOSIS — K21.00 GASTROESOPHAGEAL REFLUX DISEASE WITH ESOPHAGITIS: ICD-10-CM

## 2021-05-02 DIAGNOSIS — Z78.0 MENOPAUSE: ICD-10-CM

## 2021-05-03 RX ORDER — OMEPRAZOLE 40 MG/1
CAPSULE, DELAYED RELEASE ORAL
Qty: 30 CAPSULE | Refills: 2 | Status: SHIPPED | OUTPATIENT
Start: 2021-05-03 | End: 2021-07-26

## 2021-05-03 RX ORDER — PAROXETINE 10 MG/1
TABLET, FILM COATED ORAL
Qty: 90 TABLET | Refills: 0 | Status: SHIPPED | OUTPATIENT
Start: 2021-05-03 | End: 2021-07-26

## 2021-05-23 DIAGNOSIS — J40 BRONCHITIS: ICD-10-CM

## 2021-05-24 RX ORDER — ALBUTEROL SULFATE 90 UG/1
AEROSOL, METERED RESPIRATORY (INHALATION)
Qty: 18 G | Refills: 3 | Status: SHIPPED | OUTPATIENT
Start: 2021-05-24 | End: 2021-09-15

## 2021-05-29 DIAGNOSIS — G89.29 CHRONIC BILATERAL LOW BACK PAIN WITHOUT SCIATICA: ICD-10-CM

## 2021-05-29 DIAGNOSIS — M54.50 CHRONIC BILATERAL LOW BACK PAIN WITHOUT SCIATICA: ICD-10-CM

## 2021-06-01 RX ORDER — NAPROXEN 500 MG/1
500 TABLET ORAL DAILY PRN
Qty: 30 TABLET | Refills: 1 | Status: SHIPPED | OUTPATIENT
Start: 2021-06-01 | End: 2021-07-26

## 2021-06-25 DIAGNOSIS — J30.9 ALLERGIC RHINITIS: ICD-10-CM

## 2021-06-25 RX ORDER — FLUTICASONE PROPIONATE 50 MCG
SPRAY, SUSPENSION (ML) NASAL
Qty: 16 ML | Refills: 5 | Status: SHIPPED | OUTPATIENT
Start: 2021-06-25 | End: 2022-04-21

## 2021-07-23 DIAGNOSIS — G89.29 CHRONIC BILATERAL LOW BACK PAIN WITHOUT SCIATICA: ICD-10-CM

## 2021-07-23 DIAGNOSIS — M54.50 CHRONIC BILATERAL LOW BACK PAIN WITHOUT SCIATICA: ICD-10-CM

## 2021-07-23 DIAGNOSIS — R74.8 ELEVATED LIVER ENZYMES: ICD-10-CM

## 2021-07-23 DIAGNOSIS — K21.00 GASTROESOPHAGEAL REFLUX DISEASE WITH ESOPHAGITIS: ICD-10-CM

## 2021-07-26 DIAGNOSIS — Z78.0 MENOPAUSE: ICD-10-CM

## 2021-07-26 RX ORDER — NAPROXEN 500 MG/1
500 TABLET ORAL DAILY PRN
Qty: 30 TABLET | Refills: 1 | Status: SHIPPED | OUTPATIENT
Start: 2021-07-26 | End: 2021-10-26

## 2021-07-26 RX ORDER — OMEPRAZOLE 40 MG/1
CAPSULE, DELAYED RELEASE ORAL
Qty: 30 CAPSULE | Refills: 2 | Status: SHIPPED | OUTPATIENT
Start: 2021-07-26 | End: 2021-10-18

## 2021-07-26 RX ORDER — PAROXETINE 10 MG/1
TABLET, FILM COATED ORAL
Qty: 90 TABLET | Refills: 0 | Status: SHIPPED | OUTPATIENT
Start: 2021-07-26

## 2021-07-26 RX ORDER — FOLIC ACID 1 MG/1
TABLET ORAL
Qty: 30 TABLET | Refills: 1 | Status: SHIPPED | OUTPATIENT
Start: 2021-07-26 | End: 2021-10-26

## 2021-07-27 DIAGNOSIS — I10 ESSENTIAL HYPERTENSION: ICD-10-CM

## 2021-07-27 RX ORDER — METOPROLOL SUCCINATE 25 MG/1
TABLET, EXTENDED RELEASE ORAL
Qty: 90 TABLET | Refills: 0 | Status: SHIPPED | OUTPATIENT
Start: 2021-07-27 | End: 2021-10-26

## 2021-08-17 DIAGNOSIS — J45.40 UNCONTROLLED MODERATE PERSISTENT ASTHMA: ICD-10-CM

## 2021-08-18 RX ORDER — FLUTICASONE FUROATE, UMECLIDINIUM BROMIDE AND VILANTEROL TRIFENATATE 100; 62.5; 25 UG/1; UG/1; UG/1
1 POWDER RESPIRATORY (INHALATION) DAILY
Qty: 60 BLISTER | Refills: 3 | Status: SHIPPED | OUTPATIENT
Start: 2021-08-18 | End: 2021-12-17

## 2021-10-18 DIAGNOSIS — K21.00 GASTROESOPHAGEAL REFLUX DISEASE WITH ESOPHAGITIS: ICD-10-CM

## 2021-10-18 RX ORDER — OMEPRAZOLE 40 MG/1
CAPSULE, DELAYED RELEASE ORAL
Qty: 30 CAPSULE | Refills: 2 | Status: SHIPPED | OUTPATIENT
Start: 2021-10-18 | End: 2021-12-28

## 2021-10-22 DIAGNOSIS — R74.8 ELEVATED LIVER ENZYMES: ICD-10-CM

## 2021-10-22 DIAGNOSIS — M54.50 CHRONIC BILATERAL LOW BACK PAIN WITHOUT SCIATICA: ICD-10-CM

## 2021-10-22 DIAGNOSIS — I10 ESSENTIAL HYPERTENSION: ICD-10-CM

## 2021-10-22 DIAGNOSIS — G89.29 CHRONIC BILATERAL LOW BACK PAIN WITHOUT SCIATICA: ICD-10-CM

## 2021-10-26 RX ORDER — NAPROXEN 500 MG/1
500 TABLET ORAL DAILY PRN
Qty: 30 TABLET | Refills: 1 | Status: SHIPPED | OUTPATIENT
Start: 2021-10-26 | End: 2021-12-28

## 2021-10-26 RX ORDER — METOPROLOL SUCCINATE 25 MG/1
TABLET, EXTENDED RELEASE ORAL
Qty: 90 TABLET | Refills: 0 | Status: SHIPPED | OUTPATIENT
Start: 2021-10-26 | End: 2021-12-28

## 2021-10-26 RX ORDER — FOLIC ACID 1 MG/1
TABLET ORAL
Qty: 30 TABLET | Refills: 1 | Status: SHIPPED | OUTPATIENT
Start: 2021-10-26 | End: 2021-12-28

## 2021-12-17 DIAGNOSIS — J45.40 UNCONTROLLED MODERATE PERSISTENT ASTHMA: ICD-10-CM

## 2021-12-17 RX ORDER — FLUTICASONE FUROATE, UMECLIDINIUM BROMIDE AND VILANTEROL TRIFENATATE 100; 62.5; 25 UG/1; UG/1; UG/1
1 POWDER RESPIRATORY (INHALATION) DAILY
Qty: 60 EACH | Refills: 3 | Status: SHIPPED | OUTPATIENT
Start: 2021-12-17 | End: 2022-04-21

## 2021-12-22 ENCOUNTER — TELEPHONE (OUTPATIENT)
Dept: PULMONOLOGY | Facility: CLINIC | Age: 51
End: 2021-12-22

## 2021-12-23 DIAGNOSIS — K21.00 GASTROESOPHAGEAL REFLUX DISEASE WITH ESOPHAGITIS: ICD-10-CM

## 2021-12-23 DIAGNOSIS — R74.8 ELEVATED LIVER ENZYMES: ICD-10-CM

## 2021-12-23 DIAGNOSIS — G89.29 CHRONIC BILATERAL LOW BACK PAIN WITHOUT SCIATICA: ICD-10-CM

## 2021-12-23 DIAGNOSIS — M54.50 CHRONIC BILATERAL LOW BACK PAIN WITHOUT SCIATICA: ICD-10-CM

## 2021-12-23 DIAGNOSIS — I10 ESSENTIAL HYPERTENSION: ICD-10-CM

## 2021-12-28 RX ORDER — METOPROLOL SUCCINATE 25 MG/1
TABLET, EXTENDED RELEASE ORAL
Qty: 90 TABLET | Refills: 0 | Status: SHIPPED | OUTPATIENT
Start: 2021-12-28 | End: 2022-02-14

## 2021-12-28 RX ORDER — FOLIC ACID 1 MG/1
TABLET ORAL
Qty: 30 TABLET | Refills: 1 | Status: SHIPPED | OUTPATIENT
Start: 2021-12-28 | End: 2022-02-14

## 2021-12-28 RX ORDER — NAPROXEN 500 MG/1
500 TABLET ORAL DAILY PRN
Qty: 30 TABLET | Refills: 1 | Status: SHIPPED | OUTPATIENT
Start: 2021-12-28 | End: 2022-02-14

## 2021-12-28 RX ORDER — OMEPRAZOLE 40 MG/1
CAPSULE, DELAYED RELEASE ORAL
Qty: 30 CAPSULE | Refills: 2 | Status: SHIPPED | OUTPATIENT
Start: 2021-12-28 | End: 2022-04-21

## 2022-01-01 DIAGNOSIS — M76.891 HIP TENDINITIS, RIGHT: Primary | ICD-10-CM

## 2022-01-01 DIAGNOSIS — M25.559 HIP PAIN: Primary | ICD-10-CM

## 2022-01-01 RX ORDER — GABAPENTIN 100 MG/1
100 CAPSULE ORAL
Qty: 15 CAPSULE | Refills: 0 | Status: SHIPPED | OUTPATIENT
Start: 2022-01-01 | End: 2022-01-01

## 2022-01-12 ENCOUNTER — OFFICE VISIT (OUTPATIENT)
Dept: PULMONOLOGY | Facility: CLINIC | Age: 52
End: 2022-01-12
Payer: COMMERCIAL

## 2022-01-12 ENCOUNTER — OFFICE VISIT (OUTPATIENT)
Dept: FAMILY MEDICINE CLINIC | Facility: CLINIC | Age: 52
End: 2022-01-12
Payer: COMMERCIAL

## 2022-01-12 VITALS
BODY MASS INDEX: 19.27 KG/M2 | OXYGEN SATURATION: 96 % | TEMPERATURE: 99.5 F | HEART RATE: 88 BPM | HEIGHT: 59 IN | SYSTOLIC BLOOD PRESSURE: 130 MMHG | DIASTOLIC BLOOD PRESSURE: 82 MMHG | WEIGHT: 95.6 LBS

## 2022-01-12 VITALS
TEMPERATURE: 98.8 F | HEART RATE: 77 BPM | DIASTOLIC BLOOD PRESSURE: 80 MMHG | OXYGEN SATURATION: 99 % | BODY MASS INDEX: 19.15 KG/M2 | HEIGHT: 59 IN | SYSTOLIC BLOOD PRESSURE: 128 MMHG | WEIGHT: 95 LBS

## 2022-01-12 DIAGNOSIS — Z87.891 FORMER SMOKER: ICD-10-CM

## 2022-01-12 DIAGNOSIS — Z00.00 ANNUAL PHYSICAL EXAM: ICD-10-CM

## 2022-01-12 DIAGNOSIS — J44.9 CHRONIC OBSTRUCTIVE PULMONARY DISEASE, UNSPECIFIED COPD TYPE (HCC): Primary | ICD-10-CM

## 2022-01-12 DIAGNOSIS — Z13.1 SCREENING FOR DIABETES MELLITUS: ICD-10-CM

## 2022-01-12 DIAGNOSIS — F17.211 NICOTINE DEPENDENCE, CIGARETTES, IN REMISSION: ICD-10-CM

## 2022-01-12 DIAGNOSIS — Z12.31 ENCOUNTER FOR SCREENING MAMMOGRAM FOR BREAST CANCER: ICD-10-CM

## 2022-01-12 DIAGNOSIS — Z13.89 SCREENING FOR GENITOURINARY CONDITION: ICD-10-CM

## 2022-01-12 DIAGNOSIS — Z13.29 SCREENING FOR THYROID DISORDER: ICD-10-CM

## 2022-01-12 DIAGNOSIS — Z13.21 ENCOUNTER FOR VITAMIN DEFICIENCY SCREENING: ICD-10-CM

## 2022-01-12 DIAGNOSIS — Z13.220 SCREENING FOR LIPID DISORDERS: ICD-10-CM

## 2022-01-12 DIAGNOSIS — I10 ESSENTIAL HYPERTENSION: ICD-10-CM

## 2022-01-12 DIAGNOSIS — R74.8 ELEVATED LIVER ENZYMES: ICD-10-CM

## 2022-01-12 DIAGNOSIS — Z12.11 COLON CANCER SCREENING: ICD-10-CM

## 2022-01-12 DIAGNOSIS — F32.A DEPRESSION, UNSPECIFIED DEPRESSION TYPE: ICD-10-CM

## 2022-01-12 PROCEDURE — 99214 OFFICE O/P EST MOD 30 MIN: CPT | Performed by: INTERNAL MEDICINE

## 2022-01-12 PROCEDURE — 99396 PREV VISIT EST AGE 40-64: CPT | Performed by: INTERNAL MEDICINE

## 2022-01-12 NOTE — PROGRESS NOTES
Office Progress Note - Pulmonary    Steffanie Mendez 49 y o  female MRN: [id number]    Encounter: [id number]      Assessment:   Obstructive pulmonary disease   History of extensive tobacco use  Plan:     Trelegy Ellipta 1 inhalation once a day   Albuterol rescue inhaler 2 inhalations 4 times a day as needed   Follow-up in 6 months  Discussion:   The patient's COPD is in remission  I have maintained her on the trilogy Ellipta 1 inhalation once a day  She will use the albuterol rescue inhaler 2 inhalations 4 times a day as needed  She declined influenza vaccine today  She does not want to be vaccinated for the COVID because it conflicts with her [employer] beliefs  I will see her in 6 months in a follow-up visit  Subjective:   Patient is here for a follow-up visit  She quit smoking about a year ago  She has dyspnea on exertion which has improved compared to the last visit  Denies any significant cough, wheezing or sputum production  Denies any chest pain  No nocturnal symptoms  She is using Trelegy Ellipta 1 inhalation once a day  Sometimes she uses her albuterol rescue inhaler  Review of systems:  A 12 point system review is done and aside from what is stated above the rest of the review of systems is negative  Family history and social history are reviewed  Medications list is reviewed  Vitals: Blood pressure 128/80, pulse 77, temperature 98 8 °F (37 1 °C), temperature source Tympanic, height 4' 11" (1 499 m), weight 43 1 kg (95 lb), last menstrual period 06/02/2019, SpO2 99 %  ,     Physical Exam  Gen: Awake, alert, oriented x 3, no acute distress  HEENT: Mucous membranes moist, no oral lesions, no thrush  NECK: No accessory muscle use, JVP not elevated  Cardiac: Regular, single S1, single S2, no murmurs, no rubs, no gallops  Lungs:  Clear breath sounds  No wheezing or rhonchi    Abdomen: normoactive bowel sounds, soft nontender, nondistended, no rebound or rigidity, no guarding  Extremities: no cyanosis, no clubbing, no edema  Neuro:  Grossly nonfocal   Skin:  No rash

## 2022-01-12 NOTE — PROGRESS NOTES
Assessment/Plan:         Diagnoses and all orders for this visit:  Annual physical exam    Chronic obstructive pulmonary disease, unspecified COPD type ([facility] [address] )  No active wheezing continue current regimen will be seeing Pulmonary today              Encounter for screening mammogram for breast cancer  -     Mammo screening bilateral w 3d & cad; Future    Colon cancer screening  -     Ambulatory referral for colonoscopy; Future    Essential hypertension  -     Comprehensive metabolic panel  Well control continue current regimen    Former smoker  CT scan of the chest ordered last year has not been carried out patient will be meeting with her pulmonologist this afternoon  Screening for lipid disorders  -     Lipid panel    Screening for thyroid disorder  -     TSH, 3rd generation    Encounter for vitamin deficiency screening  -     Vitamin D Panel    Screening for genitourinary condition  -     UA (URINE) with reflex to Scope    Screening for diabetes mellitus  -     CBC and differential  -     Comprehensive metabolic panel       Depression unspecified  Controlled overall continue current regimen       Subjective:      Patient ID: Costa Rosas is a 49 y o  female  HPI  Patient is here for annual physical   I had not seen this patient for almost 2 years now  Last visit March 2020  I am glad to see no viral load was seen on recent hepatitis-C PCR  She will be meeting with her pulmonologist this afternoon  Patient is due for colonoscopy as well as mammogram   Lab studies will be ordered  She reports she is breathing much easier since she has good smoking  She denies any chest pain  Encouraged her to follow up with gynecologist as well  She states she does not believe in COVID vaccination as this is against her [employer] beliefs    The following portions of the patient's history were reviewed and updated as appropriate: allergies, current medications, past family history, past medical history, past social history, past surgical history and problem list     Review of Systems      Objective:      LMP 06/02/2019 (Exact Date)          Physical Exam  Eyes:      Conjunctiva/sclera: Conjunctivae normal    Cardiovascular:      Rate and Rhythm: Normal rate and regular rhythm  Heart sounds: Normal heart sounds  No murmur heard  Pulmonary:      Effort: Pulmonary effort is normal  No respiratory distress  Breath sounds: Normal breath sounds  No wheezing or rales  Abdominal:      General: Bowel sounds are normal  There is no distension  Tenderness: There is no abdominal tenderness  There is no guarding  Musculoskeletal:      Right lower leg: No edema  Left lower leg: No edema  Neurological:      General: No focal deficit present  Mental Status: She is alert and oriented to person, place, and time  Psychiatric:         Mood and Affect: Mood normal          Behavior: Behavior normal              BMI Counseling: Body mass index is 19 31 kg/m²  The BMI is above normal  Exercise recommendations include exercising 3-5 times per week  Rationale for BMI follow-up plan is due to patient being overweight or obese  Depression Screening and Follow-up Plan: Patient was screened for depression during today's encounter  They screened negative with a PHQ-2 score of 0

## 2022-01-20 DIAGNOSIS — J40 BRONCHITIS: ICD-10-CM

## 2022-01-24 RX ORDER — ALBUTEROL SULFATE 90 UG/1
AEROSOL, METERED RESPIRATORY (INHALATION)
Qty: 18 G | Refills: 3 | Status: SHIPPED | OUTPATIENT
Start: 2022-01-24 | End: 2022-05-17 | Stop reason: SDUPTHER

## 2022-02-14 DIAGNOSIS — R74.8 ELEVATED LIVER ENZYMES: ICD-10-CM

## 2022-02-14 DIAGNOSIS — G89.29 CHRONIC BILATERAL LOW BACK PAIN WITHOUT SCIATICA: ICD-10-CM

## 2022-02-14 DIAGNOSIS — M54.50 CHRONIC BILATERAL LOW BACK PAIN WITHOUT SCIATICA: ICD-10-CM

## 2022-02-14 DIAGNOSIS — I10 ESSENTIAL HYPERTENSION: ICD-10-CM

## 2022-02-14 RX ORDER — METOPROLOL SUCCINATE 25 MG/1
TABLET, EXTENDED RELEASE ORAL
Qty: 90 TABLET | Refills: 0 | Status: SHIPPED | OUTPATIENT
Start: 2022-02-14 | End: 2022-06-24

## 2022-02-14 RX ORDER — NAPROXEN 500 MG/1
500 TABLET ORAL DAILY PRN
Qty: 30 TABLET | Refills: 1 | Status: SHIPPED | OUTPATIENT
Start: 2022-02-14 | End: 2022-04-21

## 2022-02-14 RX ORDER — FOLIC ACID 1 MG/1
TABLET ORAL
Qty: 30 TABLET | Refills: 1 | Status: SHIPPED | OUTPATIENT
Start: 2022-02-14 | End: 2022-02-23

## 2022-04-21 DIAGNOSIS — J45.40 UNCONTROLLED MODERATE PERSISTENT ASTHMA: ICD-10-CM

## 2022-04-21 DIAGNOSIS — J30.9 ALLERGIC RHINITIS: ICD-10-CM

## 2022-04-21 DIAGNOSIS — G89.29 CHRONIC BILATERAL LOW BACK PAIN WITHOUT SCIATICA: ICD-10-CM

## 2022-04-21 DIAGNOSIS — M54.50 CHRONIC BILATERAL LOW BACK PAIN WITHOUT SCIATICA: ICD-10-CM

## 2022-04-21 DIAGNOSIS — K21.00 GASTROESOPHAGEAL REFLUX DISEASE WITH ESOPHAGITIS: ICD-10-CM

## 2022-04-21 RX ORDER — OMEPRAZOLE 40 MG/1
CAPSULE, DELAYED RELEASE ORAL
Qty: 30 CAPSULE | Refills: 2 | Status: SHIPPED | OUTPATIENT
Start: 2022-04-21 | End: 2022-07-20

## 2022-04-21 RX ORDER — FLUTICASONE FUROATE, UMECLIDINIUM BROMIDE AND VILANTEROL TRIFENATATE 100; 62.5; 25 UG/1; UG/1; UG/1
1 POWDER RESPIRATORY (INHALATION) DAILY
Qty: 60 EACH | Refills: 3 | Status: SHIPPED | OUTPATIENT
Start: 2022-04-21

## 2022-04-21 RX ORDER — FLUTICASONE PROPIONATE 50 MCG
SPRAY, SUSPENSION (ML) NASAL
Qty: 16 ML | Refills: 5 | Status: SHIPPED | OUTPATIENT
Start: 2022-04-21

## 2022-04-21 RX ORDER — NAPROXEN 500 MG/1
500 TABLET ORAL DAILY PRN
Qty: 30 TABLET | Refills: 1 | Status: SHIPPED | OUTPATIENT
Start: 2022-04-21 | End: 2022-06-20

## 2022-05-14 DIAGNOSIS — J40 BRONCHITIS: ICD-10-CM

## 2022-05-14 DIAGNOSIS — J44.9 CHRONIC OBSTRUCTIVE PULMONARY DISEASE, UNSPECIFIED COPD TYPE (HCC): Primary | ICD-10-CM

## 2022-05-16 RX ORDER — ALBUTEROL SULFATE 90 UG/1
AEROSOL, METERED RESPIRATORY (INHALATION)
Refills: 3 | Status: CANCELLED | OUTPATIENT
Start: 2022-05-16

## 2022-05-17 RX ORDER — ALBUTEROL SULFATE 90 UG/1
POWDER, METERED RESPIRATORY (INHALATION)
Qty: 1 EACH | Refills: 3 | Status: SHIPPED | OUTPATIENT
Start: 2022-05-17 | End: 2022-06-27

## 2022-05-27 ENCOUNTER — TELEPHONE (OUTPATIENT)
Dept: PULMONOLOGY | Facility: CLINIC | Age: 52
End: 2022-05-27

## 2022-05-27 NOTE — TELEPHONE ENCOUNTER
Lvm for pt to schedule a 6m f/u @ [facility] [address] office with Dr Forestine Babinski or [facility] in July or first available

## 2022-06-18 DIAGNOSIS — G89.29 CHRONIC BILATERAL LOW BACK PAIN WITHOUT SCIATICA: ICD-10-CM

## 2022-06-18 DIAGNOSIS — M54.50 CHRONIC BILATERAL LOW BACK PAIN WITHOUT SCIATICA: ICD-10-CM

## 2022-06-20 RX ORDER — NAPROXEN 500 MG/1
TABLET ORAL
Qty: 30 TABLET | Refills: 1 | Status: SHIPPED | OUTPATIENT
Start: 2022-06-20

## 2022-06-24 DIAGNOSIS — J44.9 CHRONIC OBSTRUCTIVE PULMONARY DISEASE, UNSPECIFIED COPD TYPE (HCC): ICD-10-CM

## 2022-06-27 RX ORDER — ALBUTEROL SULFATE 90 UG/1
POWDER, METERED RESPIRATORY (INHALATION)
Qty: 1 EACH | Refills: 3 | Status: SHIPPED | OUTPATIENT
Start: 2022-06-27

## 2022-07-20 DIAGNOSIS — K21.00 GASTROESOPHAGEAL REFLUX DISEASE WITH ESOPHAGITIS: ICD-10-CM

## 2022-07-20 RX ORDER — OMEPRAZOLE 40 MG/1
CAPSULE, DELAYED RELEASE ORAL
Qty: 30 CAPSULE | Refills: 2 | Status: SHIPPED | OUTPATIENT
Start: 2022-07-20 | End: 2022-10-17

## 2022-08-18 DIAGNOSIS — J45.40 UNCONTROLLED MODERATE PERSISTENT ASTHMA: ICD-10-CM

## 2022-09-02 ENCOUNTER — OFFICE VISIT (OUTPATIENT)
Dept: PULMONOLOGY | Facility: CLINIC | Age: 52
End: 2022-09-02
Payer: COMMERCIAL

## 2022-09-02 VITALS
DIASTOLIC BLOOD PRESSURE: 80 MMHG | HEIGHT: 59 IN | BODY MASS INDEX: 18.14 KG/M2 | RESPIRATION RATE: 18 BRPM | OXYGEN SATURATION: 96 % | WEIGHT: 90 LBS | SYSTOLIC BLOOD PRESSURE: 130 MMHG | HEART RATE: 100 BPM

## 2022-09-02 DIAGNOSIS — J30.9 ALLERGIC RHINITIS: ICD-10-CM

## 2022-09-02 DIAGNOSIS — J30.89 NON-SEASONAL ALLERGIC RHINITIS, UNSPECIFIED TRIGGER: ICD-10-CM

## 2022-09-02 DIAGNOSIS — J44.9 CHRONIC OBSTRUCTIVE PULMONARY DISEASE, UNSPECIFIED COPD TYPE (HCC): Primary | ICD-10-CM

## 2022-09-02 DIAGNOSIS — F17.211 NICOTINE DEPENDENCE, CIGARETTES, IN REMISSION: ICD-10-CM

## 2022-09-02 PROCEDURE — 99214 OFFICE O/P EST MOD 30 MIN: CPT | Performed by: INTERNAL MEDICINE

## 2022-09-02 RX ORDER — AZELASTINE 1 MG/ML
2 SPRAY, METERED NASAL DAILY
Qty: 30 ML | Refills: 5 | Status: SHIPPED | OUTPATIENT
Start: 2022-09-02

## 2022-09-02 RX ORDER — ALBUTEROL SULFATE 90 UG/1
2 AEROSOL, METERED RESPIRATORY (INHALATION) EVERY 6 HOURS PRN
Qty: 18 G | Refills: 5 | Status: SHIPPED | OUTPATIENT
Start: 2022-09-02

## 2022-09-02 RX ORDER — FLUTICASONE PROPIONATE 50 MCG
2 SPRAY, SUSPENSION (ML) NASAL DAILY
Qty: 16 ML | Refills: 5 | Status: SHIPPED | OUTPATIENT
Start: 2022-09-02

## 2022-09-02 NOTE — PROGRESS NOTES
Office Progress Note - Pulmonary    Steffanie Mendez 47 y o  female MRN: [id number]    Encounter: [id number]      Assessment:   Chronic obstructive pulmonary disease   Allergic rhinitis   History of tobacco use  Plan:     Trelegy Ellipta 1 inhalation once a day   Albuterol rescue inhaler 2 inhalations 4 times a day as needed   Fluticasone nasal spray 2 sprays to each nostril once a day   Astelin nasal spray 2 sprays to each nostril once a day   Follow-up in 6 months  Discussion:   The patient's COPD is in remission  I have maintained her on the trilogy Ellipta 1 inhalation once a day  She will use the albuterol rescue inhaler 2 inhalations 4 times a day as needed  Her allergic rhinitis is well treated  I have maintained her on the fluticasone nasal spray 2 sprays to each nostril once a day and Astelin nasal spray 2 sprays to each nostril once a day  I have renewed her prescriptions  I will see her in 6 months in a follow-up visit  Subjective: The patient is here for a follow-up visit  She denies any significant cough, wheezing or sputum production  No chest pain or palpitations  She has no nocturnal symptoms  She is using the trilogy Ellipta 1 inhalation once a day  She is using the albuterol rescue inhaler on average 3 times a week  She has also been using the Astelin and fluticasone nasal spray 2 sprays to each nostril once a day  She did not relapse and start smoking for a year and a half  Review of systems:  A 12 point system review is done and aside from what is stated above the rest of the review of systems is negative  Family history and social history are reviewed  Medications list is reviewed  Vitals: Blood pressure 130/80, pulse 100, resp  rate 18, height 4' 11" (1 499 m), weight 40 8 kg (90 lb), last menstrual period 06/02/2019, SpO2 96 %  ,     Physical Exam  Gen: Awake, alert, oriented x 3, no acute distress  HEENT: Mucous membranes moist, no oral lesions, no thrush  NECK: No accessory muscle use, JVP not elevated  Cardiac: Regular, single S1, single S2, no murmurs, no rubs, no gallops  Lungs:  Decreased breath sounds  No wheezing or rhonchi  Abdomen: normoactive bowel sounds, soft nontender, nondistended, no rebound or rigidity, no guarding  Extremities: no cyanosis, no clubbing, no edema  Neuro:  Grossly nonfocal   Skin:  No rash

## 2022-09-13 ENCOUNTER — OFFICE VISIT (OUTPATIENT)
Dept: FAMILY MEDICINE CLINIC | Facility: CLINIC | Age: 52
End: 2022-09-13
Payer: COMMERCIAL

## 2022-09-13 VITALS
HEART RATE: 85 BPM | RESPIRATION RATE: 16 BRPM | DIASTOLIC BLOOD PRESSURE: 88 MMHG | OXYGEN SATURATION: 98 % | WEIGHT: 90.6 LBS | BODY MASS INDEX: 18.27 KG/M2 | SYSTOLIC BLOOD PRESSURE: 174 MMHG | TEMPERATURE: 98.5 F | HEIGHT: 59 IN

## 2022-09-13 DIAGNOSIS — Z12.11 COLON CANCER SCREENING: Primary | ICD-10-CM

## 2022-09-13 DIAGNOSIS — I10 ESSENTIAL HYPERTENSION: ICD-10-CM

## 2022-09-13 DIAGNOSIS — J44.9 CHRONIC OBSTRUCTIVE PULMONARY DISEASE, UNSPECIFIED COPD TYPE (HCC): ICD-10-CM

## 2022-09-13 DIAGNOSIS — R10.13 DYSPEPSIA: ICD-10-CM

## 2022-09-13 PROCEDURE — 99214 OFFICE O/P EST MOD 30 MIN: CPT | Performed by: INTERNAL MEDICINE

## 2022-09-13 RX ORDER — FAMOTIDINE 40 MG/1
40 TABLET, FILM COATED ORAL DAILY
Qty: 30 TABLET | Refills: 3 | Status: SHIPPED | OUTPATIENT
Start: 2022-09-13

## 2022-09-13 RX ORDER — METOPROLOL SUCCINATE 25 MG/1
25 TABLET, EXTENDED RELEASE ORAL DAILY
Qty: 90 TABLET | Refills: 1 | Status: SHIPPED | OUTPATIENT
Start: 2022-09-13 | End: 2022-09-13

## 2022-09-13 RX ORDER — METOPROLOL SUCCINATE 25 MG/1
TABLET, EXTENDED RELEASE ORAL
Qty: 90 TABLET | Refills: 1 | Status: SHIPPED | OUTPATIENT
Start: 2022-09-13 | End: 2022-09-14 | Stop reason: SDUPTHER

## 2022-09-13 NOTE — PROGRESS NOTES
Assessment/Plan:           Problem List Items Addressed This Visit    None     Visit Diagnoses     Colon cancer screening    -  Primary    Relevant Orders    Ambulatory Referral to Gastroenterology    Chronic obstructive pulmonary disease, unspecified COPD type ([facility] [address] )        Essential hypertension        Relevant Medications    metoprolol succinate (TOPROL-XL) 25 mg 24 hr tablet    Dyspepsia        Relevant Medications    famotidine (PEPCID) 40 MG tablet        see discussion above    Subjective:      Patient ID: Bebeto Coe is a 47 y o  female  HPI  Patient history of COPD hypertension  history of hep C is here to follow-up on chronic medical problems  Her blood pressure is quite high today  She ran out of her prescription  She picking at them up today  She does mention she is out of her H2 blocker  She cannot stop naproxen as she has loss of aches and pains  Thankfully she has not been smoking  She is still taking Trelegy and albuterol inhaler  Recent pulmonary appointment was reviewed  Patient was reminded to go for CT scan of the chest   She is also due for mammogram   She is due for lab studies  She is overdue for colonoscopy  Another referral will be made  She does not want any vaccination including COVID  The following portions of the patient's history were reviewed and updated as appropriate: allergies, current medications, past family history, past medical history, past social history, past surgical history and problem list     Review of Systems      Objective:      BP (!) 174/88 (BP Location: Left arm, Patient Position: Sitting, Cuff Size: Standard)   Pulse 85   Temp 98 5 °F (36 9 °C) (Tympanic)   Resp 16   Ht 4' 11" (1 499 m)   Wt 41 1 kg (90 lb 9 6 oz)   LMP 06/02/2019 (Exact Date)   SpO2 98%   BMI 18 30 kg/m²          Physical Exam  Neck:      Vascular: No carotid bruit  Cardiovascular:      Rate and Rhythm: Normal rate and regular rhythm     Pulmonary:      Effort: Pulmonary effort is normal  No respiratory distress  Comments: Decreased breath sounds  Abdominal:      General: There is no distension  Palpations: There is no mass  Tenderness: There is no abdominal tenderness  There is no guarding  Musculoskeletal:      Right lower leg: No edema  Left lower leg: No edema  Neurological:      Mental Status: She is alert     Psychiatric:         Mood and Affect: Mood normal          Behavior: Behavior normal

## 2022-09-14 DIAGNOSIS — I10 ESSENTIAL HYPERTENSION: ICD-10-CM

## 2022-09-14 RX ORDER — METOPROLOL SUCCINATE 25 MG/1
25 TABLET, EXTENDED RELEASE ORAL DAILY
Qty: 90 TABLET | Refills: 1 | Status: SHIPPED | OUTPATIENT
Start: 2022-09-14

## 2022-10-05 ENCOUNTER — VBI (OUTPATIENT)
Dept: ADMINISTRATIVE | Facility: OTHER | Age: 52
End: 2022-10-05

## 2022-10-15 DIAGNOSIS — G89.29 CHRONIC BILATERAL LOW BACK PAIN WITHOUT SCIATICA: ICD-10-CM

## 2022-10-15 DIAGNOSIS — M54.50 CHRONIC BILATERAL LOW BACK PAIN WITHOUT SCIATICA: ICD-10-CM

## 2022-10-15 DIAGNOSIS — K21.00 GASTROESOPHAGEAL REFLUX DISEASE WITH ESOPHAGITIS: ICD-10-CM

## 2022-10-17 RX ORDER — OMEPRAZOLE 40 MG/1
CAPSULE, DELAYED RELEASE ORAL
Qty: 30 CAPSULE | Refills: 2 | Status: SHIPPED | OUTPATIENT
Start: 2022-10-17

## 2022-10-17 RX ORDER — NAPROXEN 500 MG/1
TABLET ORAL
Qty: 30 TABLET | Refills: 1 | Status: SHIPPED | OUTPATIENT
Start: 2022-10-17

## 2022-11-08 ENCOUNTER — APPOINTMENT (OUTPATIENT)
Dept: LAB | Facility: MEDICAL CENTER | Age: 52
End: 2022-11-08

## 2022-11-08 LAB
BACTERIA UR QL AUTO: ABNORMAL /HPF
BASOPHILS # BLD AUTO: 0.04 THOUSANDS/ÂΜL (ref 0–0.1)
BASOPHILS NFR BLD AUTO: 1 % (ref 0–1)
BILIRUB UR QL STRIP: NEGATIVE
CLARITY UR: CLEAR
COLOR UR: ABNORMAL
EOSINOPHIL # BLD AUTO: 0.08 THOUSAND/ÂΜL (ref 0–0.61)
EOSINOPHIL NFR BLD AUTO: 2 % (ref 0–6)
ERYTHROCYTE [DISTWIDTH] IN BLOOD BY AUTOMATED COUNT: 13.3 % (ref 11.6–15.1)
GLUCOSE UR STRIP-MCNC: NEGATIVE MG/DL
HCT VFR BLD AUTO: 45.7 % (ref 34.8–46.1)
HGB BLD-MCNC: 15.5 G/DL (ref 11.5–15.4)
HGB UR QL STRIP.AUTO: NEGATIVE
IMM GRANULOCYTES # BLD AUTO: 0.02 THOUSAND/UL (ref 0–0.2)
IMM GRANULOCYTES NFR BLD AUTO: 0 % (ref 0–2)
KETONES UR STRIP-MCNC: NEGATIVE MG/DL
LEUKOCYTE ESTERASE UR QL STRIP: ABNORMAL
LYMPHOCYTES # BLD AUTO: 0.45 THOUSANDS/ÂΜL (ref 0.6–4.47)
LYMPHOCYTES NFR BLD AUTO: 10 % (ref 14–44)
MCH RBC QN AUTO: 31.9 PG (ref 26.8–34.3)
MCHC RBC AUTO-ENTMCNC: 33.9 G/DL (ref 31.4–37.4)
MCV RBC AUTO: 94 FL (ref 82–98)
MONOCYTES # BLD AUTO: 0.84 THOUSAND/ÂΜL (ref 0.17–1.22)
MONOCYTES NFR BLD AUTO: 18 % (ref 4–12)
NEUTROPHILS # BLD AUTO: 3.23 THOUSANDS/ÂΜL (ref 1.85–7.62)
NEUTS SEG NFR BLD AUTO: 69 % (ref 43–75)
NITRITE UR QL STRIP: NEGATIVE
NON-SQ EPI CELLS URNS QL MICRO: ABNORMAL /HPF
NRBC BLD AUTO-RTO: 0 /100 WBCS
PH UR STRIP.AUTO: 6.5 [PH]
PLATELET # BLD AUTO: 192 THOUSANDS/UL (ref 149–390)
PMV BLD AUTO: 11.8 FL (ref 8.9–12.7)
PROT UR STRIP-MCNC: NEGATIVE MG/DL
RBC # BLD AUTO: 4.86 MILLION/UL (ref 3.81–5.12)
RBC #/AREA URNS AUTO: ABNORMAL /HPF
SP GR UR STRIP.AUTO: 1.01 (ref 1–1.03)
UROBILINOGEN UR STRIP-ACNC: <2 MG/DL
WBC # BLD AUTO: 4.66 THOUSAND/UL (ref 4.31–10.16)
WBC #/AREA URNS AUTO: ABNORMAL /HPF

## 2022-11-09 LAB
ALBUMIN SERPL BCP-MCNC: 4.1 G/DL (ref 3.5–5)
ALP SERPL-CCNC: 139 U/L (ref 46–116)
ALT SERPL W P-5'-P-CCNC: 21 U/L (ref 12–78)
ANION GAP SERPL CALCULATED.3IONS-SCNC: 7 MMOL/L (ref 4–13)
AST SERPL W P-5'-P-CCNC: 30 U/L (ref 5–45)
BILIRUB SERPL-MCNC: 0.37 MG/DL (ref 0.2–1)
BUN SERPL-MCNC: 8 MG/DL (ref 5–25)
CALCIUM SERPL-MCNC: 10 MG/DL (ref 8.3–10.1)
CHLORIDE SERPL-SCNC: 99 MMOL/L (ref 96–108)
CHOLEST SERPL-MCNC: 248 MG/DL
CO2 SERPL-SCNC: 26 MMOL/L (ref 21–32)
CREAT SERPL-MCNC: 0.64 MG/DL (ref 0.6–1.3)
GFR SERPL CREATININE-BSD FRML MDRD: 103 ML/MIN/1.73SQ M
GLUCOSE P FAST SERPL-MCNC: 137 MG/DL (ref 65–99)
HDLC SERPL-MCNC: 102 MG/DL
LDLC SERPL CALC-MCNC: 122 MG/DL (ref 0–100)
NONHDLC SERPL-MCNC: 146 MG/DL
POTASSIUM SERPL-SCNC: 4 MMOL/L (ref 3.5–5.3)
PROT SERPL-MCNC: 8.8 G/DL (ref 6.4–8.4)
SODIUM SERPL-SCNC: 132 MMOL/L (ref 135–147)
TRIGL SERPL-MCNC: 118 MG/DL
TSH SERPL DL<=0.05 MIU/L-ACNC: 1.24 UIU/ML (ref 0.45–4.5)

## 2022-11-15 DIAGNOSIS — G89.29 CHRONIC BILATERAL LOW BACK PAIN WITHOUT SCIATICA: ICD-10-CM

## 2022-11-15 DIAGNOSIS — M54.50 CHRONIC BILATERAL LOW BACK PAIN WITHOUT SCIATICA: ICD-10-CM

## 2022-11-15 LAB
25(OH)D2 SERPL-MCNC: <1 NG/ML
25(OH)D3 SERPL-MCNC: 38 NG/ML
25(OH)D3+25(OH)D2 SERPL-MCNC: 38 NG/ML

## 2022-11-16 ENCOUNTER — APPOINTMENT (OUTPATIENT)
Dept: RADIOLOGY | Facility: MEDICAL CENTER | Age: 52
End: 2022-11-16

## 2022-11-16 ENCOUNTER — OFFICE VISIT (OUTPATIENT)
Dept: FAMILY MEDICINE CLINIC | Facility: CLINIC | Age: 52
End: 2022-11-16

## 2022-11-16 VITALS
OXYGEN SATURATION: 99 % | SYSTOLIC BLOOD PRESSURE: 138 MMHG | BODY MASS INDEX: 18.31 KG/M2 | DIASTOLIC BLOOD PRESSURE: 68 MMHG | RESPIRATION RATE: 16 BRPM | HEART RATE: 93 BPM | HEIGHT: 59 IN | WEIGHT: 90.8 LBS | TEMPERATURE: 98.8 F

## 2022-11-16 DIAGNOSIS — M25.551 HIP PAIN, ACUTE, RIGHT: ICD-10-CM

## 2022-11-16 DIAGNOSIS — I70.0 ATHEROSCLEROSIS OF ABDOMINAL AORTA (HCC): ICD-10-CM

## 2022-11-16 DIAGNOSIS — R73.9 HYPERGLYCEMIA: ICD-10-CM

## 2022-11-16 DIAGNOSIS — E78.5 HYPERLIPIDEMIA, UNSPECIFIED HYPERLIPIDEMIA TYPE: ICD-10-CM

## 2022-11-16 DIAGNOSIS — I10 ESSENTIAL HYPERTENSION: Primary | ICD-10-CM

## 2022-11-16 DIAGNOSIS — Z23 IMMUNIZATION DUE: ICD-10-CM

## 2022-11-16 DIAGNOSIS — Z86.19 HISTORY OF HEPATITIS C: ICD-10-CM

## 2022-11-16 DIAGNOSIS — R74.8 ELEVATED LIVER ENZYMES: ICD-10-CM

## 2022-11-16 RX ORDER — NAPROXEN 500 MG/1
TABLET ORAL
Qty: 30 TABLET | Refills: 1 | Status: SHIPPED | OUTPATIENT
Start: 2022-11-16

## 2022-11-16 NOTE — PROGRESS NOTES
Assessment/Plan:           Problem List Items Addressed This Visit    None  Visit Diagnoses     Essential hypertension    -  Primary    Hyperlipidemia, unspecified hyperlipidemia type        Atherosclerosis of abdominal aorta (HCC)        Hyperglycemia        Hip pain, acute, right        Relevant Orders    XR hip/pelv 2-3 vws right if performed    Elevated liver enzymes        Relevant Orders    Ambulatory Referral to Gastroenterology    History of hepatitis C        Relevant Orders    Ambulatory Referral to Gastroenterology    Immunization due        Relevant Orders    influenza vaccine, quadrivalent, recombinant, PF, 0 5 mL, for patients 18 yr+ (FLUBLOK)        see discussion above  Blood pressure is well controlled  Statins advise I would hold off until seen by GI  Patient finished her treatment 2020  Would defer imaging was seen by GI  Subjective:      Patient ID: Ambika Sher is a 47 y o  female  HPI  Patient with history of hypertension COPD hepatitis-C treated is here to follow-up on chronic medical problems recent lab studies and has right leg pain  Her right leg has been going on and becoming quite uncomfortable it is difficult for her to stand or walk without a limp  She is waking up at night with extreme discomfort  Reports no giving out or locking sensation  Reports no injuries  Denies any fevers and chills  X-ray would be ordered  Patient was noted to have glucose level elevated 137  Patient advised to cut back on alcohol  A repeat lab studies in 3-4 weeks  Efra Hoover TSH was normal   Lipid profile showed LDL of 122  Patient had CT scan of the abdomen with contrast about 4 years ago that did not show any abdominal aortic aneurysm  Atherosclerosis was seen  Statins are advise which was discussed with patient detail however given persistent elevation alkaline phosphatase and total bilirubin level I would 1st check with GI before starting on statin    Once again patient advised complete abstinence from drinking  I am glad she is not smoking  Imaging study to be considered  The following portions of the patient's history were reviewed and updated as appropriate: allergies, current medications, past medical history, past social history, past surgical history and problem list     Review of Systems      Objective:      /68 (BP Location: Left arm, Patient Position: Sitting, Cuff Size: Standard)   Pulse 93   Temp 98 8 °F (37 1 °C) (Tympanic)   Resp 16   Ht 4' 11" (1 499 m)   Wt 41 2 kg (90 lb 12 8 oz)   LMP 06/02/2019 (Exact Date)   SpO2 99%   BMI 18 34 kg/m²          Physical Exam  Cardiovascular:      Rate and Rhythm: Normal rate and regular rhythm  Pulses: Normal pulses  Abdominal:      General: There is no distension  Musculoskeletal:         General: No tenderness  Right lower leg: No edema  Left lower leg: No edema  Comments: Straight leg raise is negative  Internal and external rotation right hip uncomfortable   Neurological:      Mental Status: She is alert

## 2022-11-21 ENCOUNTER — TELEPHONE (OUTPATIENT)
Dept: FAMILY MEDICINE CLINIC | Facility: CLINIC | Age: 52
End: 2022-11-21

## 2022-11-22 ENCOUNTER — TELEPHONE (OUTPATIENT)
Dept: FAMILY MEDICINE CLINIC | Facility: CLINIC | Age: 52
End: 2022-11-22

## 2022-11-22 NOTE — TELEPHONE ENCOUNTER
Pt called in states that she is in pain and the Naproxen isn't working pt was informed on the orthopedic referral, pt states that she can't get in until 12/1/2022 please advise thank you

## 2022-11-22 NOTE — TELEPHONE ENCOUNTER
----- Message from Idalmis Mitchell MD sent at 11/21/2022  4:39 PM EST -----  X-ray shows hip tendinitis    I am referring her to orthopedic :   Referred To:    Bharath Camarillo DO  Phone: [phone]  Fax: [phone] [address]  [address]  [id number]

## 2022-12-01 ENCOUNTER — TELEPHONE (OUTPATIENT)
Dept: OBGYN CLINIC | Facility: HOSPITAL | Age: 52
End: 2022-12-01

## 2022-12-01 ENCOUNTER — OFFICE VISIT (OUTPATIENT)
Dept: OBGYN CLINIC | Facility: MEDICAL CENTER | Age: 52
End: 2022-12-01

## 2022-12-01 VITALS
WEIGHT: 90.83 LBS | SYSTOLIC BLOOD PRESSURE: 160 MMHG | HEIGHT: 59 IN | DIASTOLIC BLOOD PRESSURE: 83 MMHG | BODY MASS INDEX: 18.31 KG/M2 | HEART RATE: 87 BPM

## 2022-12-01 DIAGNOSIS — M25.551 RIGHT HIP PAIN: ICD-10-CM

## 2022-12-01 DIAGNOSIS — R74.8 ELEVATED LIVER ENZYMES: ICD-10-CM

## 2022-12-01 DIAGNOSIS — M54.16 RADICULOPATHY, LUMBAR REGION: Primary | ICD-10-CM

## 2022-12-01 RX ORDER — MELOXICAM 15 MG/1
15 TABLET ORAL DAILY
Qty: 30 TABLET | Refills: 1 | Status: SHIPPED | OUTPATIENT
Start: 2022-12-01

## 2022-12-01 RX ORDER — FOLIC ACID 1 MG/1
TABLET ORAL
Qty: 90 TABLET | Refills: 1 | Status: SHIPPED | OUTPATIENT
Start: 2022-12-01

## 2022-12-01 RX ORDER — METHYLPREDNISOLONE 4 MG/1
TABLET ORAL
Qty: 21 TABLET | Refills: 0 | Status: SHIPPED | OUTPATIENT
Start: 2022-12-01

## 2022-12-01 NOTE — TELEPHONE ENCOUNTER
Prior-auth has been faxed to [employer]  Medical rationale was expressed including trial & failure of adequate relief from NSAID's indicated  Awaiting response via fax   If plan will not cover Meloxicam, Good [facility] would be alternative for lowest out of pocket price (around $5-$7)

## 2022-12-01 NOTE — TELEPHONE ENCOUNTER
Caller: donal [facility] pharmacy     Doctor: Ronnie Parikh    Reason for call: pt has to pay $12-$15 for medication meloxicam 15mg tab Pt is currently ob gabapentin,and naproxen so [facility] is requiring prior auth    Call back#: [phone] [employer], [address] [facility], [address]   Phone:  [phone]  Fax:  [phone]

## 2022-12-01 NOTE — TELEPHONE ENCOUNTER
Dr Georganna Babinski has been not registered in our prior-aut site yet  However, I have located a prior-auth form for [facility] for oral analgesics and will use this instead to initiate priior-auth  Another alternative that could be considered is Good[facility], where the patient can obtain the Rx at a much lower cost at participating pharmacy

## 2022-12-01 NOTE — PROGRESS NOTES
Hip New Office Note    Assessment:     1  Radiculopathy, lumbar region    2  Right hip pain        Plan:     Problem List Items Addressed This Visit    None  Visit Diagnoses     Radiculopathy, lumbar region    -  Primary    Relevant Medications    methylPREDNISolone 4 MG tablet therapy pack    meloxicam (Mobic) 15 mg tablet    Other Relevant Orders    Ambulatory Referral to Pain Management    Right hip pain        Relevant Medications    methylPREDNISolone 4 MG tablet therapy pack    meloxicam (Mobic) 15 mg tablet    Other Relevant Orders    Ambulatory Referral to Pain Management          46 y/o female with right hip and leg pain secondary to lumbar radiculopathy  Xrays of the right hip reviewed showing no arthritic changes and two small areas of calcification around her ASIS  On physical exam she has good motion of her hip with only mild discomfort at end range of internal rotation  Her symptoms are consistent with lumbar radiculopathy  Discussed that although she does have calcifications on her hip xrays, these are not the cause of her symptoms  Will start with conservative treatment with Mobic and medrol dose pack  She was previously prescribed naproxen which was not helping and thus she has stopped using it  Will then had her follow up with spine and pain for further management  Subjective:     Patient ID: Tyler Elise is a 47 y o  female  Patient seen in consultation at the request of Dr Abdon Oneal MD    Chief Complaint:  HPI:  Patient is a 46 y/o female who presents today for an evaluation of her Right hip  She states that she has pain in the right hip and into her buttock  She feels like the muscles are twisting and she has a burning pain in the right leg  Pain is radiating down the back of her leg and is shooting in nature  The pain is now into the left leg  She was initially worked up by her PCP who performed xrays    She has tried Naproxen which did not help and Advil which she felt has helped somewhat  She was also given Gabapentin by her PCP which she did not feel helped her symptoms and has thus stopped it  Her pain is worse with standing for long periods of time      Allergy:  Allergies   Allergen Reactions   • No Active Allergies      Medications:  all current active meds have been reviewed  Past Medical History:  Past Medical History:   Diagnosis Date   • Anxiety    • Depression    • Hepatitis C      Past Surgical History:  Past Surgical History:   Procedure Laterality Date   • BUNIONECTOMY Left      Family History:  Family History   Problem Relation Age of Onset   • Diabetes Mother    • COPD Mother    • Lung cancer Mother    • Lung cancer Father      Social History:  Social History     Substance and Sexual Activity   Alcohol Use Yes   • Alcohol/week: 6 0 standard drinks   • Types: 6 Cans of beer per week    Comment: occasional     Social History     Substance and Sexual Activity   Drug Use Yes   • Types: Marijuana     Social History     Tobacco Use   Smoking Status Former   • Packs/day: 1 50   • Years: 15 00   • Pack years: 22 50   • Types: Cigarettes   • Start date: 5   • Quit date: 2018   • Years since quittin 0   Smokeless Tobacco Never   Tobacco Comments    Stop smoking a month ago /Dec  2018           ROS:  General: Per HPI  Skin: Negative, except if noted below  HEENT: Negative  Respiratory: Negative  Cardiovascular: Negative  Gastrointestinal: Negative  Urinary: Negative  Vascular: Negative  Musculoskeletal: Positive per HPI   Neurologic: Positive per HPI  Endocrine: Negative    Objective:  BP Readings from Last 1 Encounters:   22 160/83      Wt Readings from Last 1 Encounters:   22 41 2 kg (90 lb 13 3 oz)        Respiratory:   non-labored respirations    Lymphatics:  no palpable lymph nodes    Gait and Station:   altered    Neurologic:   Alert and oriented times 3  Patient with normal sensation except as noted below  Deep tendon reflexes 2+ except as noted in MSK exam    Bilateral Lower Extremity:  Left Hip     Inspection: skin intact    Range of Motion: full wo pain    - log roll    - SLR    Motor: 5/5 IP/Q/HS/TA/GS    Pulses: 2+ DP / 2+ PT    SILT DP/SP/S/S/TN    Right Hip     Inspection: skin intact    Range of Motion: full with pain at end range of IR     - log roll    - SLR    Motor: 5/5 IP/Q/HS/TA/GS    Pulses: 2+ DP / 2+ PT    SILT DP/SP/S/S/TN    Imaging:  My interpretation XR AP pelvis/ right hip: no joint space narrowing, subchondral sclerosis, subchondral cysts, osteophyte formation  No fracture or dislocation  Small calcification at ASIS    BMI:   Estimated body mass index is 18 35 kg/m² as calculated from the following:    Height as of this encounter: 4' 11" (1 499 m)  Weight as of this encounter: 41 2 kg (90 lb 13 3 oz)  BSA:   Estimated body surface area is 1 32 meters squared as calculated from the following:    Height as of this encounter: 4' 11" (1 499 m)  Weight as of this encounter: 41 2 kg (90 lb 13 3 oz)             Scribe Attestation    I,:  Selene Love PA-C am acting as a scribe while in the presence of the attending physician :       I,:  Jonnie Tidwell, DO personally performed the services described in this documentation    as scribed in my presence :

## 2022-12-11 DIAGNOSIS — J45.40 UNCONTROLLED MODERATE PERSISTENT ASTHMA: ICD-10-CM

## 2022-12-13 ENCOUNTER — TELEPHONE (OUTPATIENT)
Dept: FAMILY MEDICINE CLINIC | Facility: CLINIC | Age: 52
End: 2022-12-13

## 2022-12-13 RX ORDER — FLUTICASONE FUROATE, UMECLIDINIUM BROMIDE AND VILANTEROL TRIFENATATE 100; 62.5; 25 UG/1; UG/1; UG/1
POWDER RESPIRATORY (INHALATION)
Qty: 60 EACH | Refills: 3 | Status: SHIPPED | OUTPATIENT
Start: 2022-12-13

## 2022-12-13 NOTE — TELEPHONE ENCOUNTER
Patient called complaining of pain in her hip area  She was given Naproxen and Gabapentin at her last visit with Dr Bob Lowry  She stated that neither one is helping the pain  She also said that she can't get into the pain specialist until February  She is requesting the last 3 office visits sent to the pain specialist   She wants to know if there is something stronger that will help with the pain  She was last seen by Dr Bob Lowry on 11/16/2022    Please advise

## 2023-01-01 ENCOUNTER — HOME CARE VISIT (OUTPATIENT)
Dept: HOME HEALTH SERVICES | Facility: HOME HEALTHCARE | Age: 53
End: 2023-01-01

## 2023-01-01 ENCOUNTER — APPOINTMENT (INPATIENT)
Dept: NON INVASIVE DIAGNOSTICS | Facility: HOSPITAL | Age: 53
End: 2023-01-01

## 2023-01-01 ENCOUNTER — APPOINTMENT (INPATIENT)
Dept: RADIOLOGY | Facility: HOSPITAL | Age: 53
End: 2023-01-01

## 2023-01-01 ENCOUNTER — APPOINTMENT (OUTPATIENT)
Dept: NEUROLOGY | Facility: CLINIC | Age: 53
End: 2023-01-01

## 2023-01-01 ENCOUNTER — APPOINTMENT (OUTPATIENT)
Dept: RADIOLOGY | Facility: HOSPITAL | Age: 53
End: 2023-01-01

## 2023-01-01 ENCOUNTER — HOSPITAL ENCOUNTER (INPATIENT)
Facility: HOSPITAL | Age: 53
LOS: 7 days | Discharge: HOSPICE | End: 2023-05-28
Attending: PSYCHIATRY & NEUROLOGY | Admitting: ANESTHESIOLOGY

## 2023-01-01 ENCOUNTER — HOME CARE VISIT (OUTPATIENT)
Dept: HOME HOSPICE | Facility: HOSPICE | Age: 53
End: 2023-01-01

## 2023-01-01 ENCOUNTER — HOSPICE ADMISSION (OUTPATIENT)
Dept: HOME HOSPICE | Facility: HOSPICE | Age: 53
End: 2023-01-01

## 2023-01-01 VITALS
DIASTOLIC BLOOD PRESSURE: 74 MMHG | TEMPERATURE: 98.5 F | WEIGHT: 90.01 LBS | HEIGHT: 59 IN | SYSTOLIC BLOOD PRESSURE: 105 MMHG | RESPIRATION RATE: 17 BRPM | HEART RATE: 118 BPM | OXYGEN SATURATION: 86 % | BODY MASS INDEX: 18.15 KG/M2

## 2023-01-01 VITALS
HEIGHT: 59 IN | RESPIRATION RATE: 18 BRPM | HEART RATE: 120 BPM | SYSTOLIC BLOOD PRESSURE: 100 MMHG | BODY MASS INDEX: 18.18 KG/M2 | DIASTOLIC BLOOD PRESSURE: 68 MMHG

## 2023-01-01 DIAGNOSIS — G93.89 BRAIN MASS: Primary | ICD-10-CM

## 2023-01-01 DIAGNOSIS — G93.40 ENCEPHALOPATHY: ICD-10-CM

## 2023-01-01 DIAGNOSIS — Z51.5 HOSPICE CARE PATIENT: Primary | ICD-10-CM

## 2023-01-01 LAB
ABO GROUP BLD: NORMAL
ALBUMIN SERPL BCP-MCNC: 2.7 G/DL (ref 3.5–5)
ALP SERPL-CCNC: 52 U/L (ref 46–116)
ALP SERPL-CCNC: 57 U/L (ref 46–116)
ALT SERPL W P-5'-P-CCNC: 12 U/L (ref 12–78)
ALT SERPL W P-5'-P-CCNC: 8 U/L (ref 12–78)
ANION GAP SERPL CALCULATED.3IONS-SCNC: -2 MMOL/L (ref 4–13)
ANION GAP SERPL CALCULATED.3IONS-SCNC: -2 MMOL/L (ref 4–13)
ANION GAP SERPL CALCULATED.3IONS-SCNC: 0 MMOL/L (ref 4–13)
ANION GAP SERPL CALCULATED.3IONS-SCNC: 1 MMOL/L (ref 4–13)
ANION GAP SERPL CALCULATED.3IONS-SCNC: 10 MMOL/L (ref 4–13)
ANION GAP SERPL CALCULATED.3IONS-SCNC: 13 MMOL/L (ref 4–13)
ANION GAP SERPL CALCULATED.3IONS-SCNC: 2 MMOL/L (ref 4–13)
ANION GAP SERPL CALCULATED.3IONS-SCNC: 3 MMOL/L (ref 4–13)
ANION GAP SERPL CALCULATED.3IONS-SCNC: 4 MMOL/L (ref 4–13)
ANION GAP SERPL CALCULATED.3IONS-SCNC: 8 MMOL/L (ref 4–13)
AORTIC ROOT: 2.5 CM
APICAL FOUR CHAMBER EJECTION FRACTION: 69 %
APTT PPP: 29 SECONDS (ref 23–37)
ASCENDING AORTA: 2.9 CM
AST SERPL W P-5'-P-CCNC: 34 U/L (ref 5–45)
AST SERPL W P-5'-P-CCNC: 43 U/L (ref 5–45)
BACTERIA BLD CULT: NORMAL
BACTERIA BLD CULT: NORMAL
BASE EX.OXY STD BLDV CALC-SCNC: 75.9 % (ref 60–80)
BASE EX.OXY STD BLDV CALC-SCNC: 81 % (ref 60–80)
BASE EX.OXY STD BLDV CALC-SCNC: 82.6 % (ref 60–80)
BASE EXCESS BLDA CALC-SCNC: -1 MMOL/L (ref -2–3)
BASE EXCESS BLDA CALC-SCNC: -1 MMOL/L (ref -2–3)
BASE EXCESS BLDA CALC-SCNC: -1.4 MMOL/L
BASE EXCESS BLDV CALC-SCNC: -10.3 MMOL/L
BASE EXCESS BLDV CALC-SCNC: -11.5 MMOL/L
BASE EXCESS BLDV CALC-SCNC: 4.1 MMOL/L
BASOPHILS # BLD AUTO: 0.01 THOUSANDS/ÂΜL (ref 0–0.1)
BASOPHILS # BLD MANUAL: 0 THOUSAND/UL (ref 0–0.1)
BASOPHILS NFR BLD AUTO: 0 % (ref 0–1)
BASOPHILS NFR MAR MANUAL: 0 % (ref 0–1)
BILIRUB SERPL-MCNC: 0.16 MG/DL (ref 0.2–1)
BILIRUB SERPL-MCNC: 0.21 MG/DL (ref 0.2–1)
BLD GP AB SCN SERPL QL: NEGATIVE
BUN SERPL-MCNC: 10 MG/DL (ref 5–25)
BUN SERPL-MCNC: 12 MG/DL (ref 5–25)
BUN SERPL-MCNC: 13 MG/DL (ref 5–25)
BUN SERPL-MCNC: 14 MG/DL (ref 5–25)
BUN SERPL-MCNC: 15 MG/DL (ref 5–25)
BUN SERPL-MCNC: 16 MG/DL (ref 5–25)
BUN SERPL-MCNC: 6 MG/DL (ref 5–25)
BUN SERPL-MCNC: 7 MG/DL (ref 5–25)
BUN SERPL-MCNC: 9 MG/DL (ref 5–25)
BUN SERPL-MCNC: 9 MG/DL (ref 5–25)
CA-I BLD-SCNC: 1.04 MMOL/L (ref 1.12–1.32)
CA-I BLD-SCNC: 1.09 MMOL/L (ref 1.12–1.32)
CALCIUM ALBUM COR SERPL-MCNC: 10.1 MG/DL (ref 8.3–10.1)
CALCIUM ALBUM COR SERPL-MCNC: 8.8 MG/DL (ref 8.3–10.1)
CALCIUM SERPL-MCNC: 7.6 MG/DL (ref 8.3–10.1)
CALCIUM SERPL-MCNC: 7.8 MG/DL (ref 8.3–10.1)
CALCIUM SERPL-MCNC: 7.8 MG/DL (ref 8.3–10.1)
CALCIUM SERPL-MCNC: 7.9 MG/DL (ref 8.3–10.1)
CALCIUM SERPL-MCNC: 7.9 MG/DL (ref 8.3–10.1)
CALCIUM SERPL-MCNC: 8 MG/DL (ref 8.3–10.1)
CALCIUM SERPL-MCNC: 8 MG/DL (ref 8.3–10.1)
CALCIUM SERPL-MCNC: 8.1 MG/DL (ref 8.3–10.1)
CALCIUM SERPL-MCNC: 8.1 MG/DL (ref 8.3–10.1)
CALCIUM SERPL-MCNC: 8.3 MG/DL (ref 8.3–10.1)
CALCIUM SERPL-MCNC: 8.3 MG/DL (ref 8.3–10.1)
CALCIUM SERPL-MCNC: 8.5 MG/DL (ref 8.3–10.1)
CALCIUM SERPL-MCNC: 8.6 MG/DL (ref 8.3–10.1)
CALCIUM SERPL-MCNC: 9.1 MG/DL (ref 8.3–10.1)
CALCIUM SERPL-MCNC: 9.2 MG/DL (ref 8.3–10.1)
CHLORIDE SERPL-SCNC: 106 MMOL/L (ref 96–108)
CHLORIDE SERPL-SCNC: 112 MMOL/L (ref 96–108)
CHLORIDE SERPL-SCNC: 114 MMOL/L (ref 96–108)
CHLORIDE SERPL-SCNC: 115 MMOL/L (ref 96–108)
CHLORIDE SERPL-SCNC: 116 MMOL/L (ref 96–108)
CHLORIDE SERPL-SCNC: 121 MMOL/L (ref 96–108)
CHLORIDE SERPL-SCNC: 121 MMOL/L (ref 96–108)
CHLORIDE SERPL-SCNC: 123 MMOL/L (ref 96–108)
CHLORIDE SERPL-SCNC: 124 MMOL/L (ref 96–108)
CHLORIDE SERPL-SCNC: 125 MMOL/L (ref 96–108)
CHLORIDE SERPL-SCNC: 126 MMOL/L (ref 96–108)
CHLORIDE SERPL-SCNC: 126 MMOL/L (ref 96–108)
CHLORIDE SERPL-SCNC: 128 MMOL/L (ref 96–108)
CHLORIDE SERPL-SCNC: 128 MMOL/L (ref 96–108)
CHLORIDE SERPL-SCNC: 129 MMOL/L (ref 96–108)
CHLORIDE SERPL-SCNC: 133 MMOL/L (ref 96–108)
CHLORIDE SERPL-SCNC: 134 MMOL/L (ref 96–108)
CO2 SERPL-SCNC: 16 MMOL/L (ref 21–32)
CO2 SERPL-SCNC: 17 MMOL/L (ref 21–32)
CO2 SERPL-SCNC: 25 MMOL/L (ref 21–32)
CO2 SERPL-SCNC: 25 MMOL/L (ref 21–32)
CO2 SERPL-SCNC: 26 MMOL/L (ref 21–32)
CO2 SERPL-SCNC: 28 MMOL/L (ref 21–32)
CO2 SERPL-SCNC: 28 MMOL/L (ref 21–32)
CO2 SERPL-SCNC: 30 MMOL/L (ref 21–32)
CO2 SERPL-SCNC: 30 MMOL/L (ref 21–32)
CO2 SERPL-SCNC: 31 MMOL/L (ref 21–32)
CREAT SERPL-MCNC: 0.46 MG/DL (ref 0.6–1.3)
CREAT SERPL-MCNC: 0.46 MG/DL (ref 0.6–1.3)
CREAT SERPL-MCNC: 0.47 MG/DL (ref 0.6–1.3)
CREAT SERPL-MCNC: 0.48 MG/DL (ref 0.6–1.3)
CREAT SERPL-MCNC: 0.5 MG/DL (ref 0.6–1.3)
CREAT SERPL-MCNC: 0.53 MG/DL (ref 0.6–1.3)
CREAT SERPL-MCNC: 0.54 MG/DL (ref 0.6–1.3)
CREAT SERPL-MCNC: 0.57 MG/DL (ref 0.6–1.3)
CREAT SERPL-MCNC: 0.58 MG/DL (ref 0.6–1.3)
CREAT SERPL-MCNC: 0.6 MG/DL (ref 0.6–1.3)
CREAT SERPL-MCNC: 0.63 MG/DL (ref 0.6–1.3)
CREAT SERPL-MCNC: 0.64 MG/DL (ref 0.6–1.3)
CREAT SERPL-MCNC: 0.65 MG/DL (ref 0.6–1.3)
CREAT SERPL-MCNC: 0.7 MG/DL (ref 0.6–1.3)
CREAT SERPL-MCNC: 0.74 MG/DL (ref 0.6–1.3)
E WAVE DECELERATION TIME: 229 MS
EOSINOPHIL # BLD AUTO: 0 THOUSAND/ÂΜL (ref 0–0.61)
EOSINOPHIL # BLD MANUAL: 0 THOUSAND/UL (ref 0–0.4)
EOSINOPHIL NFR BLD AUTO: 0 % (ref 0–6)
EOSINOPHIL NFR BLD MANUAL: 0 % (ref 0–6)
ERYTHROCYTE [DISTWIDTH] IN BLOOD BY AUTOMATED COUNT: 13.2 % (ref 11.6–15.1)
ERYTHROCYTE [DISTWIDTH] IN BLOOD BY AUTOMATED COUNT: 13.6 % (ref 11.6–15.1)
ERYTHROCYTE [DISTWIDTH] IN BLOOD BY AUTOMATED COUNT: 13.8 % (ref 11.6–15.1)
ERYTHROCYTE [DISTWIDTH] IN BLOOD BY AUTOMATED COUNT: 13.9 % (ref 11.6–15.1)
FRACTIONAL SHORTENING: 27 % (ref 28–44)
GFR SERPL CREATININE-BSD FRML MDRD: 102 ML/MIN/1.73SQ M
GFR SERPL CREATININE-BSD FRML MDRD: 103 ML/MIN/1.73SQ M
GFR SERPL CREATININE-BSD FRML MDRD: 105 ML/MIN/1.73SQ M
GFR SERPL CREATININE-BSD FRML MDRD: 106 ML/MIN/1.73SQ M
GFR SERPL CREATININE-BSD FRML MDRD: 106 ML/MIN/1.73SQ M
GFR SERPL CREATININE-BSD FRML MDRD: 108 ML/MIN/1.73SQ M
GFR SERPL CREATININE-BSD FRML MDRD: 109 ML/MIN/1.73SQ M
GFR SERPL CREATININE-BSD FRML MDRD: 111 ML/MIN/1.73SQ M
GFR SERPL CREATININE-BSD FRML MDRD: 113 ML/MIN/1.73SQ M
GFR SERPL CREATININE-BSD FRML MDRD: 113 ML/MIN/1.73SQ M
GFR SERPL CREATININE-BSD FRML MDRD: 114 ML/MIN/1.73SQ M
GFR SERPL CREATININE-BSD FRML MDRD: 114 ML/MIN/1.73SQ M
GFR SERPL CREATININE-BSD FRML MDRD: 93 ML/MIN/1.73SQ M
GFR SERPL CREATININE-BSD FRML MDRD: 99 ML/MIN/1.73SQ M
GIANT PLATELETS BLD QL SMEAR: PRESENT
GLUCOSE SERPL-MCNC: 102 MG/DL (ref 65–140)
GLUCOSE SERPL-MCNC: 108 MG/DL (ref 65–140)
GLUCOSE SERPL-MCNC: 112 MG/DL (ref 65–140)
GLUCOSE SERPL-MCNC: 113 MG/DL (ref 65–140)
GLUCOSE SERPL-MCNC: 117 MG/DL (ref 65–140)
GLUCOSE SERPL-MCNC: 118 MG/DL (ref 65–140)
GLUCOSE SERPL-MCNC: 119 MG/DL (ref 65–140)
GLUCOSE SERPL-MCNC: 119 MG/DL (ref 65–140)
GLUCOSE SERPL-MCNC: 120 MG/DL (ref 65–140)
GLUCOSE SERPL-MCNC: 131 MG/DL (ref 65–140)
GLUCOSE SERPL-MCNC: 132 MG/DL (ref 65–140)
GLUCOSE SERPL-MCNC: 135 MG/DL (ref 65–140)
GLUCOSE SERPL-MCNC: 143 MG/DL (ref 65–140)
GLUCOSE SERPL-MCNC: 144 MG/DL (ref 65–140)
GLUCOSE SERPL-MCNC: 147 MG/DL (ref 65–140)
GLUCOSE SERPL-MCNC: 152 MG/DL (ref 65–140)
GLUCOSE SERPL-MCNC: 154 MG/DL (ref 65–140)
GLUCOSE SERPL-MCNC: 157 MG/DL (ref 65–140)
GLUCOSE SERPL-MCNC: 163 MG/DL (ref 65–140)
GLUCOSE SERPL-MCNC: 164 MG/DL (ref 65–140)
GLUCOSE SERPL-MCNC: 165 MG/DL (ref 65–140)
GLUCOSE SERPL-MCNC: 168 MG/DL (ref 65–140)
GLUCOSE SERPL-MCNC: 172 MG/DL (ref 65–140)
GLUCOSE SERPL-MCNC: 172 MG/DL (ref 65–140)
GLUCOSE SERPL-MCNC: 176 MG/DL (ref 65–140)
GLUCOSE SERPL-MCNC: 176 MG/DL (ref 65–140)
GLUCOSE SERPL-MCNC: 177 MG/DL (ref 65–140)
GLUCOSE SERPL-MCNC: 180 MG/DL (ref 65–140)
GLUCOSE SERPL-MCNC: 194 MG/DL (ref 65–140)
GLUCOSE SERPL-MCNC: 198 MG/DL (ref 65–140)
GLUCOSE SERPL-MCNC: 199 MG/DL (ref 65–140)
GLUCOSE SERPL-MCNC: 220 MG/DL (ref 65–140)
GLUCOSE SERPL-MCNC: 238 MG/DL (ref 65–140)
GLUCOSE SERPL-MCNC: 284 MG/DL (ref 65–140)
HCO3 BLDA-SCNC: 22.8 MMOL/L (ref 22–28)
HCO3 BLDA-SCNC: 23.7 MMOL/L (ref 22–28)
HCO3 BLDA-SCNC: 23.9 MMOL/L (ref 22–28)
HCO3 BLDV-SCNC: 13.3 MMOL/L (ref 24–30)
HCO3 BLDV-SCNC: 15.4 MMOL/L (ref 24–30)
HCO3 BLDV-SCNC: 29.2 MMOL/L (ref 24–30)
HCT VFR BLD AUTO: 30.6 % (ref 34.8–46.1)
HCT VFR BLD AUTO: 31 % (ref 34.8–46.1)
HCT VFR BLD AUTO: 33.4 % (ref 34.8–46.1)
HCT VFR BLD AUTO: 35.1 % (ref 34.8–46.1)
HCT VFR BLD CALC: 32 % (ref 34.8–46.1)
HCT VFR BLD CALC: 35 % (ref 34.8–46.1)
HGB BLD-MCNC: 10.8 G/DL (ref 11.5–15.4)
HGB BLD-MCNC: 12.4 G/DL (ref 11.5–15.4)
HGB BLD-MCNC: 9.9 G/DL (ref 11.5–15.4)
HGB BLD-MCNC: 9.9 G/DL (ref 11.5–15.4)
HGB BLDA-MCNC: 10.9 G/DL (ref 11.5–15.4)
HGB BLDA-MCNC: 11.9 G/DL (ref 11.5–15.4)
HOROWITZ INDEX BLDA+IHG-RTO: 60 MM[HG]
HOROWITZ INDEX BLDA+IHG-RTO: ABNORMAL MM[HG]
IMM GRANULOCYTES # BLD AUTO: 0.07 THOUSAND/UL (ref 0–0.2)
IMM GRANULOCYTES NFR BLD AUTO: 1 % (ref 0–2)
INR PPP: 1.34 (ref 0.84–1.19)
INR PPP: 1.4 (ref 0.84–1.19)
INTERVENTRICULAR SEPTUM IN DIASTOLE (PARASTERNAL SHORT AXIS VIEW): 0.9 CM
INTERVENTRICULAR SEPTUM: 0.9 CM (ref 0.6–1.1)
LAAS-AP2: 7 CM2
LAAS-AP4: 8.8 CM2
LEFT ATRIUM SIZE: 2.3 CM
LEFT INTERNAL DIMENSION IN SYSTOLE: 2.2 CM (ref 2.1–4)
LEFT VENTRICULAR INTERNAL DIMENSION IN DIASTOLE: 3 CM (ref 3.5–6)
LEFT VENTRICULAR POSTERIOR WALL IN END DIASTOLE: 0.9 CM
LEFT VENTRICULAR STROKE VOLUME: 20 ML
LVSV (TEICH): 20 ML
LYMPHOCYTES # BLD AUTO: 0 % (ref 14–44)
LYMPHOCYTES # BLD AUTO: 0 THOUSAND/UL (ref 0.6–4.47)
LYMPHOCYTES # BLD AUTO: 0.35 THOUSANDS/ÂΜL (ref 0.6–4.47)
LYMPHOCYTES NFR BLD AUTO: 4 % (ref 14–44)
MAGNESIUM SERPL-MCNC: 1.3 MG/DL (ref 1.9–2.7)
MAGNESIUM SERPL-MCNC: 1.6 MG/DL (ref 1.6–2.6)
MAGNESIUM SERPL-MCNC: 1.9 MG/DL (ref 1.6–2.6)
MAGNESIUM SERPL-MCNC: 2.2 MG/DL (ref 1.6–2.6)
MCH RBC QN AUTO: 31.9 PG (ref 26.8–34.3)
MCH RBC QN AUTO: 32.5 PG (ref 26.8–34.3)
MCH RBC QN AUTO: 32.5 PG (ref 26.8–34.3)
MCH RBC QN AUTO: 32.7 PG (ref 26.8–34.3)
MCHC RBC AUTO-ENTMCNC: 31.9 G/DL (ref 31.4–37.4)
MCHC RBC AUTO-ENTMCNC: 32.3 G/DL (ref 31.4–37.4)
MCHC RBC AUTO-ENTMCNC: 32.4 G/DL (ref 31.4–37.4)
MCHC RBC AUTO-ENTMCNC: 35.3 G/DL (ref 31.4–37.4)
MCV RBC AUTO: 100 FL (ref 82–98)
MCV RBC AUTO: 102 FL (ref 82–98)
MCV RBC AUTO: 92 FL (ref 82–98)
MCV RBC AUTO: 99 FL (ref 82–98)
MONOCYTES # BLD AUTO: 0.58 THOUSAND/UL (ref 0–1.22)
MONOCYTES # BLD AUTO: 0.81 THOUSAND/ÂΜL (ref 0.17–1.22)
MONOCYTES NFR BLD AUTO: 9 % (ref 4–12)
MONOCYTES NFR BLD: 3 % (ref 4–12)
MRSA NOSE QL CULT: NORMAL
MV E'TISSUE VEL-SEP: 6 CM/S
MV PEAK A VEL: 0.47 M/S
MV PEAK E VEL: 54 CM/S
MV STENOSIS PRESSURE HALF TIME: 66 MS
MV VALVE AREA P 1/2 METHOD: 3.33 CM2
NEUTROPHILS # BLD AUTO: 8.29 THOUSANDS/ÂΜL (ref 1.85–7.62)
NEUTROPHILS # BLD MANUAL: 18.8 THOUSAND/UL (ref 1.85–7.62)
NEUTS BAND NFR BLD MANUAL: 13 % (ref 0–8)
NEUTS SEG NFR BLD AUTO: 84 % (ref 43–75)
NEUTS SEG NFR BLD AUTO: 86 % (ref 43–75)
NRBC BLD AUTO-RTO: 0 /100 WBCS
O2 CT BLDA-SCNC: 18 ML/DL (ref 16–23)
O2 CT BLDV-SCNC: 11.8 ML/DL
O2 CT BLDV-SCNC: 15.7 ML/DL
O2 CT BLDV-SCNC: 17.2 ML/DL
OSMOLALITY UR/SERPL-RTO: 310 MMOL/KG (ref 282–298)
OSMOLALITY UR/SERPL-RTO: 315 MMOL/KG (ref 282–298)
OSMOLALITY UR/SERPL-RTO: 322 MMOL/KG (ref 282–298)
OSMOLALITY UR/SERPL-RTO: 322 MMOL/KG (ref 282–298)
OSMOLALITY UR/SERPL-RTO: 323 MMOL/KG (ref 282–298)
OSMOLALITY UR/SERPL-RTO: 324 MMOL/KG (ref 282–298)
OSMOLALITY UR/SERPL-RTO: 325 MMOL/KG (ref 282–298)
OSMOLALITY UR/SERPL-RTO: 327 MMOL/KG (ref 282–298)
OSMOLALITY UR/SERPL-RTO: 328 MMOL/KG (ref 282–298)
OSMOLALITY UR/SERPL-RTO: 329.5 MMOL/KG (ref 282–298)
OSMOLALITY UR/SERPL-RTO: 331 MMOL/KG (ref 282–298)
OSMOLALITY UR/SERPL-RTO: 335 MMOL/KG (ref 282–298)
OSMOLALITY UR/SERPL-RTO: 336 MMOL/KG (ref 282–298)
OSMOLALITY UR/SERPL-RTO: 339 MMOL/KG (ref 282–298)
OSMOLALITY UR/SERPL-RTO: 347 MMOL/KG (ref 282–298)
OXYHGB MFR BLDA: 97.9 % (ref 94–97)
PCO2 BLD: 25 MMOL/L (ref 21–32)
PCO2 BLD: 25 MMOL/L (ref 21–32)
PCO2 BLD: 38.1 MM HG (ref 36–44)
PCO2 BLD: 38.5 MM HG (ref 36–44)
PCO2 BLDA: 36.6 MM HG (ref 36–44)
PCO2 BLDV: 28 MM HG (ref 42–50)
PCO2 BLDV: 33.7 MM HG (ref 42–50)
PCO2 BLDV: 46.2 MM HG (ref 42–50)
PEEP RESPIRATORY: 6 CM[H2O]
PEEP RESPIRATORY: 6 CM[H2O]
PH BLD: 7.4 [PH] (ref 7.35–7.45)
PH BLD: 7.41 [PH] (ref 7.35–7.45)
PH BLDA: 7.41 [PH] (ref 7.35–7.45)
PH BLDV: 7.28 [PH] (ref 7.3–7.4)
PH BLDV: 7.3 [PH] (ref 7.3–7.4)
PH BLDV: 7.42 [PH] (ref 7.3–7.4)
PHOSPHATE SERPL-MCNC: 1.8 MG/DL (ref 2.7–4.5)
PHOSPHATE SERPL-MCNC: 1.8 MG/DL (ref 2.7–4.5)
PHOSPHATE SERPL-MCNC: 2 MG/DL (ref 2.7–4.5)
PHOSPHATE SERPL-MCNC: 2.8 MG/DL (ref 2.7–4.5)
PHOSPHATE SERPL-MCNC: 3.1 MG/DL (ref 2.7–4.5)
PHOSPHATE SERPL-MCNC: 4.4 MG/DL (ref 2.7–4.5)
PLATELET # BLD AUTO: 127 THOUSANDS/UL (ref 149–390)
PLATELET # BLD AUTO: 150 THOUSANDS/UL (ref 149–390)
PLATELET # BLD AUTO: 187 THOUSANDS/UL (ref 149–390)
PLATELET # BLD AUTO: 247 THOUSANDS/UL (ref 149–390)
PLATELET BLD QL SMEAR: ADEQUATE
PMV BLD AUTO: 11.9 FL (ref 8.9–12.7)
PMV BLD AUTO: 12.1 FL (ref 8.9–12.7)
PMV BLD AUTO: 12.9 FL (ref 8.9–12.7)
PMV BLD AUTO: 13 FL (ref 8.9–12.7)
PO2 BLD: 106 MM HG (ref 75–129)
PO2 BLD: 81 MM HG (ref 75–129)
PO2 BLDA: 145.1 MM HG (ref 75–129)
PO2 BLDV: 41.9 MM HG (ref 35–45)
PO2 BLDV: 51.8 MM HG (ref 35–45)
PO2 BLDV: 53.6 MM HG (ref 35–45)
POLYCHROMASIA BLD QL SMEAR: PRESENT
POTASSIUM BLD-SCNC: 3.2 MMOL/L (ref 3.5–5.3)
POTASSIUM BLD-SCNC: 3.8 MMOL/L (ref 3.5–5.3)
POTASSIUM SERPL-SCNC: 2.9 MMOL/L (ref 3.5–5.3)
POTASSIUM SERPL-SCNC: 3 MMOL/L (ref 3.5–5.3)
POTASSIUM SERPL-SCNC: 3.4 MMOL/L (ref 3.5–5.3)
POTASSIUM SERPL-SCNC: 3.5 MMOL/L (ref 3.5–5.3)
POTASSIUM SERPL-SCNC: 3.6 MMOL/L (ref 3.5–5.3)
POTASSIUM SERPL-SCNC: 3.7 MMOL/L (ref 3.5–5.3)
POTASSIUM SERPL-SCNC: 3.8 MMOL/L (ref 3.5–5.3)
POTASSIUM SERPL-SCNC: 3.8 MMOL/L (ref 3.5–5.3)
POTASSIUM SERPL-SCNC: 4 MMOL/L (ref 3.5–5.3)
POTASSIUM SERPL-SCNC: 4.2 MMOL/L (ref 3.5–5.3)
POTASSIUM SERPL-SCNC: 4.2 MMOL/L (ref 3.5–5.3)
POTASSIUM SERPL-SCNC: 4.4 MMOL/L (ref 3.5–5.3)
POTASSIUM SERPL-SCNC: 4.4 MMOL/L (ref 3.5–5.3)
PROT SERPL-MCNC: 6 G/DL (ref 6.4–8.4)
PROT SERPL-MCNC: 6.5 G/DL (ref 6.4–8.4)
PROTHROMBIN TIME: 16.8 SECONDS (ref 11.6–14.5)
PROTHROMBIN TIME: 17.4 SECONDS (ref 11.6–14.5)
RBC # BLD AUTO: 3.03 MILLION/UL (ref 3.81–5.12)
RBC # BLD AUTO: 3.05 MILLION/UL (ref 3.81–5.12)
RBC # BLD AUTO: 3.39 MILLION/UL (ref 3.81–5.12)
RBC # BLD AUTO: 3.81 MILLION/UL (ref 3.81–5.12)
RBC MORPH BLD: PRESENT
RH BLD: POSITIVE
RIGHT ATRIAL 2D VOLUME: 6 ML
RIGHT ATRIUM AREA SYSTOLE A4C: 5.2 CM2
RIGHT VENTRICLE ID DIMENSION: 2.5 CM
SAO2 % BLD FROM PO2: 96 % (ref 60–85)
SAO2 % BLD FROM PO2: 98 % (ref 60–85)
SL CV LEFT ATRIUM LENGTH A2C: 3 CM
SL CV LV EF: 70
SL CV PED ECHO LEFT VENTRICLE DIASTOLIC VOLUME (MOD BIPLANE) 2D: 36 ML
SL CV PED ECHO LEFT VENTRICLE SYSTOLIC VOLUME (MOD BIPLANE) 2D: 16 ML
SODIUM BLD-SCNC: 150 MMOL/L (ref 136–145)
SODIUM BLD-SCNC: 152 MMOL/L (ref 136–145)
SODIUM SERPL-SCNC: 135 MMOL/L (ref 135–147)
SODIUM SERPL-SCNC: 138 MMOL/L (ref 135–147)
SODIUM SERPL-SCNC: 144 MMOL/L (ref 135–147)
SODIUM SERPL-SCNC: 145 MMOL/L (ref 135–147)
SODIUM SERPL-SCNC: 145 MMOL/L (ref 135–147)
SODIUM SERPL-SCNC: 146 MMOL/L (ref 135–147)
SODIUM SERPL-SCNC: 149 MMOL/L (ref 135–147)
SODIUM SERPL-SCNC: 151 MMOL/L (ref 135–147)
SODIUM SERPL-SCNC: 152 MMOL/L (ref 135–147)
SODIUM SERPL-SCNC: 153 MMOL/L (ref 135–147)
SODIUM SERPL-SCNC: 154 MMOL/L (ref 135–147)
SODIUM SERPL-SCNC: 154 MMOL/L (ref 135–147)
SODIUM SERPL-SCNC: 155 MMOL/L (ref 135–147)
SODIUM SERPL-SCNC: 159 MMOL/L (ref 135–147)
SODIUM SERPL-SCNC: 160 MMOL/L (ref 135–147)
SPECIMEN EXPIRATION DATE: NORMAL
SPECIMEN SOURCE: ABNORMAL
TR MAX PG: 15 MMHG
TR PEAK VELOCITY: 2 M/S
TRICUSPID ANNULAR PLANE SYSTOLIC EXCURSION: 1.6 CM
TRICUSPID VALVE PEAK REGURGITATION VELOCITY: 1.95 M/S
VENT AC: 20
VENT AC: 24
VENT- AC: AC
VENT- AC: AC
VT SETTING VENT: 350 ML
VT SETTING VENT: 350 ML
WBC # BLD AUTO: 11.05 THOUSAND/UL (ref 4.31–10.16)
WBC # BLD AUTO: 19.38 THOUSAND/UL (ref 4.31–10.16)
WBC # BLD AUTO: 9.53 THOUSAND/UL (ref 4.31–10.16)
WBC # BLD AUTO: 9.82 THOUSAND/UL (ref 4.31–10.16)

## 2023-01-01 PROCEDURE — 00B70ZZ EXCISION OF CEREBRAL HEMISPHERE, OPEN APPROACH: ICD-10-PCS | Performed by: NEUROLOGICAL SURGERY

## 2023-01-01 PROCEDURE — 02HV33Z INSERTION OF INFUSION DEVICE INTO SUPERIOR VENA CAVA, PERCUTANEOUS APPROACH: ICD-10-PCS | Performed by: INTERNAL MEDICINE

## 2023-01-01 PROCEDURE — 8E09XBZ COMPUTER ASSISTED PROCEDURE OF HEAD AND NECK REGION: ICD-10-PCS | Performed by: NEUROLOGICAL SURGERY

## 2023-01-01 PROCEDURE — 5A1945Z RESPIRATORY VENTILATION, 24-96 CONSECUTIVE HOURS: ICD-10-PCS | Performed by: INTERNAL MEDICINE

## 2023-01-01 PROCEDURE — 0BH17EZ INSERTION OF ENDOTRACHEAL AIRWAY INTO TRACHEA, VIA NATURAL OR ARTIFICIAL OPENING: ICD-10-PCS | Performed by: INTERNAL MEDICINE

## 2023-01-01 DEVICE — IMPLANTABLE DEVICE
Type: IMPLANTABLE DEVICE | Site: CRANIAL | Status: FUNCTIONAL
Brand: THINFLAP SYSTEM

## 2023-01-01 DEVICE — IMPLANTABLE DEVICE
Type: IMPLANTABLE DEVICE | Site: CRANIAL | Status: FUNCTIONAL
Brand: THINFLAP

## 2023-01-01 RX ORDER — FENTANYL CITRATE 50 UG/ML
25 INJECTION, SOLUTION INTRAMUSCULAR; INTRAVENOUS ONCE
Status: COMPLETED | OUTPATIENT
Start: 2023-01-01 | End: 2023-01-01

## 2023-01-01 RX ORDER — FLUCONAZOLE 200 MG/100ML
100 INJECTION, SOLUTION INTRAVENOUS EVERY 24 HOURS
Status: DISCONTINUED | OUTPATIENT
Start: 2023-01-01 | End: 2023-01-01

## 2023-01-01 RX ORDER — BISACODYL 10 MG
10 SUPPOSITORY, RECTAL RECTAL DAILY PRN
Status: DISCONTINUED | OUTPATIENT
Start: 2023-01-01 | End: 2023-01-01 | Stop reason: HOSPADM

## 2023-01-01 RX ORDER — MAGNESIUM SULFATE HEPTAHYDRATE 40 MG/ML
2 INJECTION, SOLUTION INTRAVENOUS ONCE
Status: COMPLETED | OUTPATIENT
Start: 2023-01-01 | End: 2023-01-01

## 2023-01-01 RX ORDER — POTASSIUM CHLORIDE 20MEQ/15ML
40 LIQUID (ML) ORAL ONCE
Status: COMPLETED | OUTPATIENT
Start: 2023-01-01 | End: 2023-01-01

## 2023-01-01 RX ORDER — FENTANYL CITRATE 50 UG/ML
50 INJECTION, SOLUTION INTRAMUSCULAR; INTRAVENOUS ONCE
Status: COMPLETED | OUTPATIENT
Start: 2023-01-01 | End: 2023-01-01

## 2023-01-01 RX ORDER — PANTOPRAZOLE SODIUM 40 MG/10ML
40 INJECTION, POWDER, LYOPHILIZED, FOR SOLUTION INTRAVENOUS
Status: DISCONTINUED | OUTPATIENT
Start: 2023-01-01 | End: 2023-01-01

## 2023-01-01 RX ORDER — INSULIN LISPRO 100 [IU]/ML
1-5 INJECTION, SOLUTION INTRAVENOUS; SUBCUTANEOUS EVERY 6 HOURS SCHEDULED
Status: DISCONTINUED | OUTPATIENT
Start: 2023-01-01 | End: 2023-01-01

## 2023-01-01 RX ORDER — HYDROMORPHONE HCL/PF 1 MG/ML
0.2 SYRINGE (ML) INJECTION EVERY 2 HOUR PRN
Status: DISCONTINUED | OUTPATIENT
Start: 2023-01-01 | End: 2023-01-01

## 2023-01-01 RX ORDER — HYDRALAZINE HYDROCHLORIDE 20 MG/ML
10 INJECTION INTRAMUSCULAR; INTRAVENOUS EVERY 4 HOURS PRN
Status: DISCONTINUED | OUTPATIENT
Start: 2023-01-01 | End: 2023-01-01

## 2023-01-01 RX ORDER — CEFAZOLIN SODIUM 2 G/50ML
2000 SOLUTION INTRAVENOUS EVERY 8 HOURS
Status: COMPLETED | OUTPATIENT
Start: 2023-01-01 | End: 2023-01-01

## 2023-01-01 RX ORDER — OXYCODONE HYDROCHLORIDE 5 MG/1
5 TABLET ORAL EVERY 4 HOURS PRN
Status: DISCONTINUED | OUTPATIENT
Start: 2023-01-01 | End: 2023-01-01

## 2023-01-01 RX ORDER — CHLORHEXIDINE GLUCONATE 0.12 MG/ML
15 RINSE ORAL ONCE
Status: DISCONTINUED | OUTPATIENT
Start: 2023-01-01 | End: 2023-01-01

## 2023-01-01 RX ORDER — POTASSIUM CHLORIDE 29.8 MG/ML
40 INJECTION INTRAVENOUS ONCE
Status: DISCONTINUED | OUTPATIENT
Start: 2023-01-01 | End: 2023-01-01

## 2023-01-01 RX ORDER — FOLIC ACID 1 MG/1
1 TABLET ORAL DAILY
Status: DISCONTINUED | OUTPATIENT
Start: 2023-01-01 | End: 2023-01-01

## 2023-01-01 RX ORDER — MANNITOL 250 MG/ML
INJECTION, SOLUTION INTRAVENOUS
Status: DISPENSED
Start: 2023-01-01 | End: 2023-01-01

## 2023-01-01 RX ORDER — HYDRALAZINE HYDROCHLORIDE 20 MG/ML
10 INJECTION INTRAMUSCULAR; INTRAVENOUS ONCE
Status: COMPLETED | OUTPATIENT
Start: 2023-01-01 | End: 2023-01-01

## 2023-01-01 RX ORDER — CALCIUM GLUCONATE 20 MG/ML
1 INJECTION, SOLUTION INTRAVENOUS ONCE
Status: COMPLETED | OUTPATIENT
Start: 2023-01-01 | End: 2023-01-01

## 2023-01-01 RX ORDER — SODIUM CHLORIDE, SODIUM GLUCONATE, SODIUM ACETATE, POTASSIUM CHLORIDE, MAGNESIUM CHLORIDE, SODIUM PHOSPHATE, DIBASIC, AND POTASSIUM PHOSPHATE .53; .5; .37; .037; .03; .012; .00082 G/100ML; G/100ML; G/100ML; G/100ML; G/100ML; G/100ML; G/100ML
75 INJECTION, SOLUTION INTRAVENOUS CONTINUOUS
Status: DISCONTINUED | OUTPATIENT
Start: 2023-01-01 | End: 2023-01-01

## 2023-01-01 RX ORDER — POTASSIUM CHLORIDE 29.8 MG/ML
40 INJECTION INTRAVENOUS ONCE
Status: COMPLETED | OUTPATIENT
Start: 2023-01-01 | End: 2023-01-01

## 2023-01-01 RX ORDER — SODIUM CHLORIDE 9 MG/ML
75 INJECTION, SOLUTION INTRAVENOUS CONTINUOUS
Status: DISCONTINUED | OUTPATIENT
Start: 2023-01-01 | End: 2023-01-01

## 2023-01-01 RX ORDER — SODIUM CHLORIDE 3 G/100ML
250 INJECTION, SOLUTION INTRAVENOUS ONCE
Status: COMPLETED | OUTPATIENT
Start: 2023-01-01 | End: 2023-01-01

## 2023-01-01 RX ORDER — ALBUTEROL SULFATE 2.5 MG/3ML
2.5 SOLUTION RESPIRATORY (INHALATION) EVERY 4 HOURS PRN
Status: DISCONTINUED | OUTPATIENT
Start: 2023-01-01 | End: 2023-01-01

## 2023-01-01 RX ORDER — FENTANYL CITRATE 50 UG/ML
100 INJECTION, SOLUTION INTRAMUSCULAR; INTRAVENOUS ONCE
Status: COMPLETED | OUTPATIENT
Start: 2023-01-01 | End: 2023-01-01

## 2023-01-01 RX ORDER — BACITRACIN, NEOMYCIN, POLYMYXIN B 400; 3.5; 5 [USP'U]/G; MG/G; [USP'U]/G
OINTMENT TOPICAL AS NEEDED
Status: DISCONTINUED | OUTPATIENT
Start: 2023-01-01 | End: 2023-01-01 | Stop reason: HOSPADM

## 2023-01-01 RX ORDER — FENTANYL CITRATE-0.9 % NACL/PF 10 MCG/ML
50 PLASTIC BAG, INJECTION (ML) INTRAVENOUS CONTINUOUS
Status: DISCONTINUED | OUTPATIENT
Start: 2023-01-01 | End: 2023-01-01

## 2023-01-01 RX ORDER — MORPHINE SULFATE 4 MG/ML
4 INJECTION, SOLUTION INTRAMUSCULAR; INTRAVENOUS
Status: DISCONTINUED | OUTPATIENT
Start: 2023-01-01 | End: 2023-01-01 | Stop reason: HOSPADM

## 2023-01-01 RX ORDER — DEXAMETHASONE SODIUM PHOSPHATE 10 MG/ML
4 INJECTION, SOLUTION INTRAMUSCULAR; INTRAVENOUS EVERY 8 HOURS
Status: DISCONTINUED | OUTPATIENT
Start: 2023-01-01 | End: 2023-01-01

## 2023-01-01 RX ORDER — MIDAZOLAM HYDROCHLORIDE 2 MG/2ML
2 INJECTION, SOLUTION INTRAMUSCULAR; INTRAVENOUS ONCE
Status: COMPLETED | OUTPATIENT
Start: 2023-01-01 | End: 2023-01-01

## 2023-01-01 RX ORDER — ONDANSETRON 2 MG/ML
4 INJECTION INTRAMUSCULAR; INTRAVENOUS EVERY 6 HOURS PRN
Status: DISCONTINUED | OUTPATIENT
Start: 2023-01-01 | End: 2023-01-01 | Stop reason: HOSPADM

## 2023-01-01 RX ORDER — MANNITOL 250 MG/ML
50 INJECTION, SOLUTION INTRAVENOUS ONCE
Status: COMPLETED | OUTPATIENT
Start: 2023-01-01 | End: 2023-01-01

## 2023-01-01 RX ORDER — ALBUTEROL SULFATE 2.5 MG/3ML
2.5 SOLUTION RESPIRATORY (INHALATION) 3 TIMES DAILY
Status: DISCONTINUED | OUTPATIENT
Start: 2023-01-01 | End: 2023-01-01

## 2023-01-01 RX ORDER — LANOLIN ALCOHOL/MO/W.PET/CERES
100 CREAM (GRAM) TOPICAL DAILY
Status: DISCONTINUED | OUTPATIENT
Start: 2023-01-01 | End: 2023-01-01

## 2023-01-01 RX ORDER — AMOXICILLIN 250 MG
1 CAPSULE ORAL 2 TIMES DAILY
Status: DISCONTINUED | OUTPATIENT
Start: 2023-01-01 | End: 2023-01-01

## 2023-01-01 RX ORDER — ALBUTEROL SULFATE 90 UG/1
1 AEROSOL, METERED RESPIRATORY (INHALATION) EVERY 4 HOURS PRN
Status: DISCONTINUED | OUTPATIENT
Start: 2023-01-01 | End: 2023-01-01

## 2023-01-01 RX ORDER — FENTANYL CITRATE 50 UG/ML
50 INJECTION, SOLUTION INTRAMUSCULAR; INTRAVENOUS
Status: DISCONTINUED | OUTPATIENT
Start: 2023-01-01 | End: 2023-01-01

## 2023-01-01 RX ORDER — NICARDIPINE HYDROCHLORIDE 2.5 MG/ML
INJECTION INTRAVENOUS
Status: DISPENSED
Start: 2023-01-01 | End: 2023-01-01

## 2023-01-01 RX ORDER — LORAZEPAM 2 MG/ML
1 CONCENTRATE ORAL EVERY 4 HOURS PRN
Qty: 30 ML | Refills: 0 | Status: SHIPPED | OUTPATIENT
Start: 2023-01-01 | End: 2023-05-30 | Stop reason: CLARIF

## 2023-01-01 RX ORDER — POTASSIUM CHLORIDE 14.9 MG/ML
20 INJECTION INTRAVENOUS
Status: DISCONTINUED | OUTPATIENT
Start: 2023-01-01 | End: 2023-01-01

## 2023-01-01 RX ORDER — GLYCOPYRROLATE 0.2 MG/ML
0.1 INJECTION INTRAMUSCULAR; INTRAVENOUS EVERY 4 HOURS PRN
Status: DISCONTINUED | OUTPATIENT
Start: 2023-01-01 | End: 2023-01-01 | Stop reason: HOSPADM

## 2023-01-01 RX ORDER — ACETAMINOPHEN 650 MG/1
325 SUPPOSITORY RECTAL EVERY 4 HOURS PRN
Status: DISCONTINUED | OUTPATIENT
Start: 2023-01-01 | End: 2023-01-01

## 2023-01-01 RX ORDER — DOCUSATE SODIUM 100 MG/1
100 CAPSULE, LIQUID FILLED ORAL 2 TIMES DAILY
Status: DISCONTINUED | OUTPATIENT
Start: 2023-01-01 | End: 2023-01-01 | Stop reason: DRUGHIGH

## 2023-01-01 RX ORDER — FENTANYL CITRATE 50 UG/ML
INJECTION, SOLUTION INTRAMUSCULAR; INTRAVENOUS
Status: COMPLETED
Start: 2023-01-01 | End: 2023-01-01

## 2023-01-01 RX ORDER — DEXAMETHASONE SODIUM PHOSPHATE 10 MG/ML
6 INJECTION, SOLUTION INTRAMUSCULAR; INTRAVENOUS EVERY 6 HOURS SCHEDULED
Status: DISCONTINUED | OUTPATIENT
Start: 2023-01-01 | End: 2023-01-01

## 2023-01-01 RX ORDER — LEVALBUTEROL INHALATION SOLUTION 1.25 MG/3ML
1.25 SOLUTION RESPIRATORY (INHALATION)
Status: DISCONTINUED | OUTPATIENT
Start: 2023-01-01 | End: 2023-01-01

## 2023-01-01 RX ORDER — GABAPENTIN 100 MG/1
100 CAPSULE ORAL
Status: DISCONTINUED | OUTPATIENT
Start: 2023-01-01 | End: 2023-01-01

## 2023-01-01 RX ORDER — DEXAMETHASONE SODIUM PHOSPHATE 10 MG/ML
2 INJECTION, SOLUTION INTRAMUSCULAR; INTRAVENOUS EVERY 6 HOURS SCHEDULED
Status: DISCONTINUED | OUTPATIENT
Start: 2023-01-01 | End: 2023-01-01

## 2023-01-01 RX ORDER — INSULIN LISPRO 100 [IU]/ML
1-6 INJECTION, SOLUTION INTRAVENOUS; SUBCUTANEOUS EVERY 6 HOURS SCHEDULED
Status: DISCONTINUED | OUTPATIENT
Start: 2023-01-01 | End: 2023-01-01

## 2023-01-01 RX ORDER — DEXAMETHASONE SODIUM PHOSPHATE 4 MG/ML
4 INJECTION, SOLUTION INTRA-ARTICULAR; INTRALESIONAL; INTRAMUSCULAR; INTRAVENOUS; SOFT TISSUE EVERY 6 HOURS SCHEDULED
Status: DISCONTINUED | OUTPATIENT
Start: 2023-01-01 | End: 2023-01-01

## 2023-01-01 RX ORDER — DEXAMETHASONE SODIUM PHOSPHATE 10 MG/ML
4 INJECTION, SOLUTION INTRAMUSCULAR; INTRAVENOUS EVERY 6 HOURS SCHEDULED
Status: COMPLETED | OUTPATIENT
Start: 2023-01-01 | End: 2023-01-01

## 2023-01-01 RX ORDER — OXYCODONE HYDROCHLORIDE 5 MG/1
2.5 TABLET ORAL EVERY 4 HOURS PRN
Status: DISCONTINUED | OUTPATIENT
Start: 2023-01-01 | End: 2023-01-01

## 2023-01-01 RX ORDER — MANNITOL 250 MG/ML
75 INJECTION, SOLUTION INTRAVENOUS ONCE
Status: COMPLETED | OUTPATIENT
Start: 2023-01-01 | End: 2023-01-01

## 2023-01-01 RX ORDER — LEVETIRACETAM 500 MG/1
500 TABLET ORAL EVERY 12 HOURS SCHEDULED
Status: DISCONTINUED | OUTPATIENT
Start: 2023-01-01 | End: 2023-01-01

## 2023-01-01 RX ORDER — SENNOSIDES 8.6 MG
1 TABLET ORAL DAILY
Status: DISCONTINUED | OUTPATIENT
Start: 2023-01-01 | End: 2023-01-01 | Stop reason: DRUGHIGH

## 2023-01-01 RX ORDER — PROPOFOL 10 MG/ML
5-50 INJECTION, EMULSION INTRAVENOUS
Status: DISCONTINUED | OUTPATIENT
Start: 2023-01-01 | End: 2023-01-01

## 2023-01-01 RX ORDER — LORAZEPAM 2 MG/ML
1 INJECTION INTRAMUSCULAR
Status: DISCONTINUED | OUTPATIENT
Start: 2023-01-01 | End: 2023-01-01 | Stop reason: HOSPADM

## 2023-01-01 RX ORDER — EPINEPHRINE 1 MG/ML
INJECTION, SOLUTION, CONCENTRATE INTRAVENOUS
Status: DISPENSED
Start: 2023-01-01 | End: 2023-01-01

## 2023-01-01 RX ORDER — DEXAMETHASONE SODIUM PHOSPHATE 10 MG/ML
2 INJECTION, SOLUTION INTRAMUSCULAR; INTRAVENOUS EVERY 12 HOURS SCHEDULED
Status: DISCONTINUED | OUTPATIENT
Start: 2023-06-04 | End: 2023-01-01

## 2023-01-01 RX ORDER — MIDAZOLAM HYDROCHLORIDE 2 MG/2ML
2 INJECTION, SOLUTION INTRAMUSCULAR; INTRAVENOUS ONCE
Status: DISCONTINUED | OUTPATIENT
Start: 2023-01-01 | End: 2023-01-01

## 2023-01-01 RX ORDER — DEXAMETHASONE SODIUM PHOSPHATE 10 MG/ML
2 INJECTION, SOLUTION INTRAMUSCULAR; INTRAVENOUS EVERY 24 HOURS
Status: DISCONTINUED | OUTPATIENT
Start: 2023-06-07 | End: 2023-01-01

## 2023-01-01 RX ORDER — LEVALBUTEROL INHALATION SOLUTION 1.25 MG/3ML
1.25 SOLUTION RESPIRATORY (INHALATION) EVERY 8 HOURS SCHEDULED
Status: DISCONTINUED | OUTPATIENT
Start: 2023-01-01 | End: 2023-01-01

## 2023-01-01 RX ORDER — HALOPERIDOL 5 MG/ML
0.5 INJECTION INTRAMUSCULAR EVERY 2 HOUR PRN
Status: DISCONTINUED | OUTPATIENT
Start: 2023-01-01 | End: 2023-01-01

## 2023-01-01 RX ORDER — HYDRALAZINE HYDROCHLORIDE 20 MG/ML
10 INJECTION INTRAMUSCULAR; INTRAVENOUS ONCE
Status: DISCONTINUED | OUTPATIENT
Start: 2023-01-01 | End: 2023-01-01

## 2023-01-01 RX ORDER — POTASSIUM CHLORIDE 20MEQ/15ML
20 LIQUID (ML) ORAL ONCE
Status: COMPLETED | OUTPATIENT
Start: 2023-01-01 | End: 2023-01-01

## 2023-01-01 RX ORDER — HALOPERIDOL 5 MG/ML
2 INJECTION INTRAMUSCULAR EVERY 2 HOUR PRN
Status: DISCONTINUED | OUTPATIENT
Start: 2023-01-01 | End: 2023-01-01 | Stop reason: HOSPADM

## 2023-01-01 RX ORDER — POTASSIUM CHLORIDE 14.9 MG/ML
20 INJECTION INTRAVENOUS ONCE
Status: COMPLETED | OUTPATIENT
Start: 2023-01-01 | End: 2023-01-01

## 2023-01-01 RX ORDER — SODIUM CHLORIDE 9 MG/ML
125 INJECTION, SOLUTION INTRAVENOUS CONTINUOUS
Status: DISCONTINUED | OUTPATIENT
Start: 2023-01-01 | End: 2023-01-01

## 2023-01-01 RX ORDER — EPINEPHRINE IN SOD CHLOR,ISO 1 MG/10 ML
SYRINGE (ML) INTRAVENOUS
Status: DISPENSED
Start: 2023-01-01 | End: 2023-01-01

## 2023-01-01 RX ORDER — CHLORHEXIDINE GLUCONATE 0.12 MG/ML
15 RINSE ORAL EVERY 12 HOURS SCHEDULED
Status: DISCONTINUED | OUTPATIENT
Start: 2023-01-01 | End: 2023-01-01

## 2023-01-01 RX ORDER — MANNITOL 250 MG/ML
INJECTION, SOLUTION INTRAVENOUS
Status: COMPLETED
Start: 2023-01-01 | End: 2023-01-01

## 2023-01-01 RX ORDER — CEFAZOLIN SODIUM 2 G/50ML
2000 SOLUTION INTRAVENOUS ONCE
Status: DISCONTINUED | OUTPATIENT
Start: 2023-01-01 | End: 2023-01-01

## 2023-01-01 RX ORDER — FAMOTIDINE 20 MG/1
40 TABLET, FILM COATED ORAL DAILY
Status: DISCONTINUED | OUTPATIENT
Start: 2023-01-01 | End: 2023-01-01

## 2023-01-01 RX ORDER — FLUTICASONE FUROATE AND VILANTEROL 100; 25 UG/1; UG/1
1 POWDER RESPIRATORY (INHALATION) DAILY
Status: DISCONTINUED | OUTPATIENT
Start: 2023-01-01 | End: 2023-01-01

## 2023-01-01 RX ORDER — ACETAMINOPHEN 160 MG/5ML
650 SUSPENSION ORAL EVERY 6 HOURS PRN
Status: DISCONTINUED | OUTPATIENT
Start: 2023-01-01 | End: 2023-01-01 | Stop reason: HOSPADM

## 2023-01-01 RX ORDER — FLUCONAZOLE 2 MG/ML
200 INJECTION, SOLUTION INTRAVENOUS EVERY 24 HOURS
Status: DISCONTINUED | OUTPATIENT
Start: 2023-01-01 | End: 2023-01-01

## 2023-01-01 RX ORDER — PAROXETINE 10 MG/1
10 TABLET, FILM COATED ORAL DAILY
Status: DISCONTINUED | OUTPATIENT
Start: 2023-01-01 | End: 2023-01-01

## 2023-01-01 RX ORDER — NOREPINEPHRINE BITARTRATE 1 MG/ML
INJECTION, SOLUTION INTRAVENOUS
Status: COMPLETED
Start: 2023-01-01 | End: 2023-01-01

## 2023-01-01 RX ORDER — MIDAZOLAM HYDROCHLORIDE 2 MG/2ML
INJECTION, SOLUTION INTRAMUSCULAR; INTRAVENOUS
Status: DISPENSED
Start: 2023-01-01 | End: 2023-01-01

## 2023-01-01 RX ORDER — DEXAMETHASONE SODIUM PHOSPHATE 10 MG/ML
2 INJECTION, SOLUTION INTRAMUSCULAR; INTRAVENOUS EVERY 8 HOURS
Status: DISCONTINUED | OUTPATIENT
Start: 2023-06-01 | End: 2023-01-01

## 2023-01-01 RX ORDER — LIDOCAINE HYDROCHLORIDE AND EPINEPHRINE 10; 10 MG/ML; UG/ML
INJECTION, SOLUTION INFILTRATION; PERINEURAL AS NEEDED
Status: DISCONTINUED | OUTPATIENT
Start: 2023-01-01 | End: 2023-01-01 | Stop reason: HOSPADM

## 2023-01-01 RX ORDER — 3% SODIUM CHLORIDE 3 G/100ML
20 INJECTION, SOLUTION INTRAVENOUS CONTINUOUS
Status: DISCONTINUED | OUTPATIENT
Start: 2023-01-01 | End: 2023-01-01

## 2023-01-01 RX ORDER — HEPARIN SODIUM 5000 [USP'U]/ML
5000 INJECTION, SOLUTION INTRAVENOUS; SUBCUTANEOUS EVERY 8 HOURS SCHEDULED
Status: DISCONTINUED | OUTPATIENT
Start: 2023-01-01 | End: 2023-01-01

## 2023-01-01 RX ADMIN — Medication 1 TABLET: at 09:22

## 2023-01-01 RX ADMIN — LEVETIRACETAM 500 MG: 100 INJECTION, SOLUTION INTRAVENOUS at 08:50

## 2023-01-01 RX ADMIN — DEXAMETHASONE SODIUM PHOSPHATE 4 MG: 10 INJECTION, SOLUTION INTRAMUSCULAR; INTRAVENOUS at 05:24

## 2023-01-01 RX ADMIN — FENTANYL CITRATE 50 MCG: 50 INJECTION INTRAMUSCULAR; INTRAVENOUS at 21:51

## 2023-01-01 RX ADMIN — INSULIN LISPRO 1 UNITS: 100 INJECTION, SOLUTION INTRAVENOUS; SUBCUTANEOUS at 05:41

## 2023-01-01 RX ADMIN — DEXAMETHASONE SODIUM PHOSPHATE 4 MG: 10 INJECTION, SOLUTION INTRAMUSCULAR; INTRAVENOUS at 12:22

## 2023-01-01 RX ADMIN — DEXAMETHASONE SODIUM PHOSPHATE 4 MG: 4 INJECTION INTRA-ARTICULAR; INTRALESIONAL; INTRAMUSCULAR; INTRAVENOUS; SOFT TISSUE at 23:34

## 2023-01-01 RX ADMIN — DEXAMETHASONE SODIUM PHOSPHATE 4 MG: 10 INJECTION, SOLUTION INTRAMUSCULAR; INTRAVENOUS at 23:59

## 2023-01-01 RX ADMIN — CHLORHEXIDINE GLUCONATE 15 ML: 1.2 SOLUTION ORAL at 21:17

## 2023-01-01 RX ADMIN — NICOTINE 7 MG: 7 PATCH, EXTENDED RELEASE TRANSDERMAL at 12:28

## 2023-01-01 RX ADMIN — DEXAMETHASONE SODIUM PHOSPHATE 6 MG: 10 INJECTION, SOLUTION INTRAMUSCULAR; INTRAVENOUS at 17:16

## 2023-01-01 RX ADMIN — GADOBUTROL 4 ML: 604.72 INJECTION INTRAVENOUS at 03:12

## 2023-01-01 RX ADMIN — LEVALBUTEROL HYDROCHLORIDE 1.25 MG: 1.25 SOLUTION RESPIRATORY (INHALATION) at 20:30

## 2023-01-01 RX ADMIN — IPRATROPIUM BROMIDE 0.5 MG: 0.5 SOLUTION RESPIRATORY (INHALATION) at 08:02

## 2023-01-01 RX ADMIN — Medication 100 MCG/HR: at 06:16

## 2023-01-01 RX ADMIN — HYDRALAZINE HYDROCHLORIDE 10 MG: 20 INJECTION, SOLUTION INTRAMUSCULAR; INTRAVENOUS at 20:03

## 2023-01-01 RX ADMIN — HEPARIN SODIUM 5000 UNITS: 5000 INJECTION INTRAVENOUS; SUBCUTANEOUS at 14:21

## 2023-01-01 RX ADMIN — LEVETIRACETAM 500 MG: 100 INJECTION, SOLUTION INTRAVENOUS at 21:13

## 2023-01-01 RX ADMIN — POTASSIUM CHLORIDE 40 MEQ: 1.5 SOLUTION ORAL at 00:17

## 2023-01-01 RX ADMIN — CHLORHEXIDINE GLUCONATE 15 ML: 1.2 SOLUTION ORAL at 09:22

## 2023-01-01 RX ADMIN — LEVALBUTEROL HYDROCHLORIDE 1.25 MG: 1.25 SOLUTION RESPIRATORY (INHALATION) at 19:21

## 2023-01-01 RX ADMIN — MORPHINE SULFATE 4 MG: 4 INJECTION INTRAVENOUS at 02:20

## 2023-01-01 RX ADMIN — IPRATROPIUM BROMIDE 0.5 MG: 0.5 SOLUTION RESPIRATORY (INHALATION) at 20:30

## 2023-01-01 RX ADMIN — Medication 100 MCG/HR: at 02:11

## 2023-01-01 RX ADMIN — GADOBUTROL 4 ML: 604.72 INJECTION INTRAVENOUS at 22:42

## 2023-01-01 RX ADMIN — CEFTRIAXONE SODIUM 1000 MG: 10 INJECTION, POWDER, FOR SOLUTION INTRAVENOUS at 18:38

## 2023-01-01 RX ADMIN — PROPOFOL 50 MCG/KG/MIN: 10 INJECTION, EMULSION INTRAVENOUS at 13:01

## 2023-01-01 RX ADMIN — PAROXETINE 10 MG: 10 TABLET, FILM COATED ORAL at 09:26

## 2023-01-01 RX ADMIN — INSULIN LISPRO 1 UNITS: 100 INJECTION, SOLUTION INTRAVENOUS; SUBCUTANEOUS at 06:12

## 2023-01-01 RX ADMIN — CALCIUM GLUCONATE 1 G: 20 INJECTION, SOLUTION INTRAVENOUS at 05:14

## 2023-01-01 RX ADMIN — IPRATROPIUM BROMIDE 0.5 MG: 0.5 SOLUTION RESPIRATORY (INHALATION) at 07:33

## 2023-01-01 RX ADMIN — PAROXETINE 10 MG: 10 TABLET, FILM COATED ORAL at 12:36

## 2023-01-01 RX ADMIN — ACETAMINOPHEN 650 MG: 650 SUSPENSION ORAL at 09:25

## 2023-01-01 RX ADMIN — FENTANYL CITRATE 50 MCG: 50 INJECTION INTRAMUSCULAR; INTRAVENOUS at 22:53

## 2023-01-01 RX ADMIN — SODIUM CHLORIDE 250 ML: 3 INJECTION, SOLUTION INTRAVENOUS at 18:20

## 2023-01-01 RX ADMIN — DEXAMETHASONE SODIUM PHOSPHATE 4 MG: 10 INJECTION, SOLUTION INTRAMUSCULAR; INTRAVENOUS at 12:37

## 2023-01-01 RX ADMIN — NOREPINEPHRINE BITARTRATE 20 MCG/MIN: 1 INJECTION, SOLUTION, CONCENTRATE INTRAVENOUS at 19:05

## 2023-01-01 RX ADMIN — SENNOSIDES AND DOCUSATE SODIUM 1 TABLET: 50; 8.6 TABLET ORAL at 09:22

## 2023-01-01 RX ADMIN — SODIUM CHLORIDE 250 ML: 3 INJECTION, SOLUTION INTRAVENOUS at 03:16

## 2023-01-01 RX ADMIN — LEVALBUTEROL HYDROCHLORIDE 1.25 MG: 1.25 SOLUTION RESPIRATORY (INHALATION) at 07:33

## 2023-01-01 RX ADMIN — SENNOSIDES 8.6 MG: 8.6 TABLET, FILM COATED ORAL at 09:16

## 2023-01-01 RX ADMIN — INSULIN LISPRO 1 UNITS: 100 INJECTION, SOLUTION INTRAVENOUS; SUBCUTANEOUS at 00:35

## 2023-01-01 RX ADMIN — POTASSIUM CHLORIDE 40 MEQ: 1.5 SOLUTION ORAL at 21:27

## 2023-01-01 RX ADMIN — FAMOTIDINE 40 MG: 20 TABLET, FILM COATED ORAL at 08:48

## 2023-01-01 RX ADMIN — THIAMINE HCL TAB 100 MG 100 MG: 100 TAB at 08:47

## 2023-01-01 RX ADMIN — DEXAMETHASONE SODIUM PHOSPHATE 4 MG: 10 INJECTION, SOLUTION INTRAMUSCULAR; INTRAVENOUS at 12:00

## 2023-01-01 RX ADMIN — THIAMINE HCL TAB 100 MG 100 MG: 100 TAB at 09:22

## 2023-01-01 RX ADMIN — ACETAMINOPHEN 325 MG: 650 SUPPOSITORY RECTAL at 13:33

## 2023-01-01 RX ADMIN — FENTANYL CITRATE 50 MCG: 50 INJECTION INTRAMUSCULAR; INTRAVENOUS at 03:22

## 2023-01-01 RX ADMIN — POTASSIUM CHLORIDE 40 MEQ: 1.5 SOLUTION ORAL at 04:16

## 2023-01-01 RX ADMIN — LEVETIRACETAM 500 MG: 100 INJECTION, SOLUTION INTRAVENOUS at 21:28

## 2023-01-01 RX ADMIN — MORPHINE SULFATE 4 MG: 4 INJECTION INTRAVENOUS at 08:45

## 2023-01-01 RX ADMIN — DEXAMETHASONE SODIUM PHOSPHATE 4 MG: 10 INJECTION, SOLUTION INTRAMUSCULAR; INTRAVENOUS at 18:34

## 2023-01-01 RX ADMIN — DEXAMETHASONE SODIUM PHOSPHATE 4 MG: 10 INJECTION, SOLUTION INTRAMUSCULAR; INTRAVENOUS at 23:55

## 2023-01-01 RX ADMIN — FOLIC ACID 1 MG: 1 TABLET ORAL at 08:47

## 2023-01-01 RX ADMIN — FOLIC ACID 1 MG: 1 TABLET ORAL at 12:34

## 2023-01-01 RX ADMIN — PROPOFOL 50 MCG/KG/MIN: 10 INJECTION, EMULSION INTRAVENOUS at 00:10

## 2023-01-01 RX ADMIN — HEPARIN SODIUM 5000 UNITS: 5000 INJECTION INTRAVENOUS; SUBCUTANEOUS at 05:24

## 2023-01-01 RX ADMIN — DEXAMETHASONE SODIUM PHOSPHATE 4 MG: 10 INJECTION, SOLUTION INTRAMUSCULAR; INTRAVENOUS at 18:42

## 2023-01-01 RX ADMIN — INSULIN LISPRO 1 UNITS: 100 INJECTION, SOLUTION INTRAVENOUS; SUBCUTANEOUS at 05:24

## 2023-01-01 RX ADMIN — CEFTRIAXONE SODIUM 1000 MG: 10 INJECTION, POWDER, FOR SOLUTION INTRAVENOUS at 18:33

## 2023-01-01 RX ADMIN — Medication 50 MCG/HR: at 12:55

## 2023-01-01 RX ADMIN — LORAZEPAM 1 MG: 2 INJECTION INTRAMUSCULAR; INTRAVENOUS at 22:38

## 2023-01-01 RX ADMIN — MAGNESIUM SULFATE HEPTAHYDRATE 2 G: 40 INJECTION, SOLUTION INTRAVENOUS at 21:18

## 2023-01-01 RX ADMIN — DEXAMETHASONE SODIUM PHOSPHATE 4 MG: 10 INJECTION, SOLUTION INTRAMUSCULAR; INTRAVENOUS at 18:43

## 2023-01-01 RX ADMIN — LEVALBUTEROL HYDROCHLORIDE 1.25 MG: 1.25 SOLUTION RESPIRATORY (INHALATION) at 13:45

## 2023-01-01 RX ADMIN — GLYCOPYRROLATE 0.1 MG: 0.2 INJECTION, SOLUTION INTRAMUSCULAR; INTRAVENOUS at 22:31

## 2023-01-01 RX ADMIN — SODIUM CHLORIDE 30 ML/HR: 3 INJECTION, SOLUTION INTRAVENOUS at 18:40

## 2023-01-01 RX ADMIN — SENNOSIDES 8.6 MG: 8.6 TABLET, FILM COATED ORAL at 08:48

## 2023-01-01 RX ADMIN — THIAMINE HCL TAB 100 MG 100 MG: 100 TAB at 09:15

## 2023-01-01 RX ADMIN — Medication 20 MG: at 09:00

## 2023-01-01 RX ADMIN — CEFAZOLIN SODIUM 2000 MG: 2 SOLUTION INTRAVENOUS at 09:03

## 2023-01-01 RX ADMIN — POTASSIUM CHLORIDE 20 MEQ: 14.9 INJECTION, SOLUTION INTRAVENOUS at 17:04

## 2023-01-01 RX ADMIN — POTASSIUM CHLORIDE 40 MEQ: 1.5 SOLUTION ORAL at 19:59

## 2023-01-01 RX ADMIN — FENTANYL CITRATE 50 MCG: 50 INJECTION INTRAMUSCULAR; INTRAVENOUS at 21:36

## 2023-01-01 RX ADMIN — LEVALBUTEROL HYDROCHLORIDE 1.25 MG: 1.25 SOLUTION RESPIRATORY (INHALATION) at 08:02

## 2023-01-01 RX ADMIN — CHLORHEXIDINE GLUCONATE 15 ML: 1.2 SOLUTION ORAL at 08:48

## 2023-01-01 RX ADMIN — Medication 50 MCG/HR: at 22:14

## 2023-01-01 RX ADMIN — HEPARIN SODIUM 5000 UNITS: 5000 INJECTION INTRAVENOUS; SUBCUTANEOUS at 14:18

## 2023-01-01 RX ADMIN — Medication 100 MCG/HR: at 19:09

## 2023-01-01 RX ADMIN — FOLIC ACID 1 MG: 1 TABLET ORAL at 09:22

## 2023-01-01 RX ADMIN — NOREPINEPHRINE BITARTRATE 4 MCG/MIN: 1 INJECTION, SOLUTION, CONCENTRATE INTRAVENOUS at 05:57

## 2023-01-01 RX ADMIN — PANTOPRAZOLE SODIUM 40 MG: 40 INJECTION, POWDER, FOR SOLUTION INTRAVENOUS at 12:27

## 2023-01-01 RX ADMIN — SODIUM CHLORIDE, SODIUM GLUCONATE, SODIUM ACETATE, POTASSIUM CHLORIDE, MAGNESIUM CHLORIDE, SODIUM PHOSPHATE, DIBASIC, AND POTASSIUM PHOSPHATE 75 ML/HR: .53; .5; .37; .037; .03; .012; .00082 INJECTION, SOLUTION INTRAVENOUS at 15:29

## 2023-01-01 RX ADMIN — CHLORHEXIDINE GLUCONATE 15 ML: 1.2 SOLUTION ORAL at 20:23

## 2023-01-01 RX ADMIN — MANNITOL 75 G: 250 INJECTION, SOLUTION INTRAVENOUS at 18:15

## 2023-01-01 RX ADMIN — DEXAMETHASONE SODIUM PHOSPHATE 4 MG: 10 INJECTION, SOLUTION INTRAMUSCULAR; INTRAVENOUS at 05:50

## 2023-01-01 RX ADMIN — GLYCOPYRROLATE 0.1 MG: 0.2 INJECTION, SOLUTION INTRAMUSCULAR; INTRAVENOUS at 15:04

## 2023-01-01 RX ADMIN — DOCUSATE SODIUM 100 MG: 100 CAPSULE, LIQUID FILLED ORAL at 18:34

## 2023-01-01 RX ADMIN — GABAPENTIN 100 MG: 100 CAPSULE ORAL at 21:27

## 2023-01-01 RX ADMIN — LEVETIRACETAM 500 MG: 500 TABLET, FILM COATED ORAL at 21:01

## 2023-01-01 RX ADMIN — Medication 1 TABLET: at 12:28

## 2023-01-01 RX ADMIN — DEXAMETHASONE SODIUM PHOSPHATE 4 MG: 10 INJECTION, SOLUTION INTRAMUSCULAR; INTRAVENOUS at 05:42

## 2023-01-01 RX ADMIN — HYDRALAZINE HYDROCHLORIDE 10 MG: 20 INJECTION, SOLUTION INTRAMUSCULAR; INTRAVENOUS at 13:26

## 2023-01-01 RX ADMIN — Medication 50 MCG/HR: at 21:03

## 2023-01-01 RX ADMIN — FENTANYL CITRATE 50 MCG: 50 INJECTION INTRAMUSCULAR; INTRAVENOUS at 22:17

## 2023-01-01 RX ADMIN — Medication 100 MCG/HR: at 19:18

## 2023-01-01 RX ADMIN — POTASSIUM CHLORIDE 20 MEQ: 1.5 SOLUTION ORAL at 22:55

## 2023-01-01 RX ADMIN — NICOTINE 7 MG: 7 PATCH, EXTENDED RELEASE TRANSDERMAL at 08:49

## 2023-01-01 RX ADMIN — FENTANYL CITRATE 100 MCG: 50 INJECTION, SOLUTION INTRAMUSCULAR; INTRAVENOUS at 19:04

## 2023-01-01 RX ADMIN — LEVETIRACETAM 500 MG: 100 INJECTION, SOLUTION INTRAVENOUS at 21:30

## 2023-01-01 RX ADMIN — FENTANYL CITRATE 25 MCG: 50 INJECTION INTRAMUSCULAR; INTRAVENOUS at 13:53

## 2023-01-01 RX ADMIN — CEFAZOLIN SODIUM 2000 MG: 2 SOLUTION INTRAVENOUS at 18:41

## 2023-01-01 RX ADMIN — Medication 1 TABLET: at 09:15

## 2023-01-01 RX ADMIN — PANTOPRAZOLE SODIUM 40 MG: 40 INJECTION, POWDER, FOR SOLUTION INTRAVENOUS at 13:26

## 2023-01-01 RX ADMIN — INSULIN LISPRO 1 UNITS: 100 INJECTION, SOLUTION INTRAVENOUS; SUBCUTANEOUS at 00:00

## 2023-01-01 RX ADMIN — IOHEXOL 85 ML: 350 INJECTION, SOLUTION INTRAVENOUS at 22:32

## 2023-01-01 RX ADMIN — SODIUM CHLORIDE, SODIUM GLUCONATE, SODIUM ACETATE, POTASSIUM CHLORIDE, MAGNESIUM CHLORIDE, SODIUM PHOSPHATE, DIBASIC, AND POTASSIUM PHOSPHATE 75 ML/HR: .53; .5; .37; .037; .03; .012; .00082 INJECTION, SOLUTION INTRAVENOUS at 00:27

## 2023-01-01 RX ADMIN — DEXAMETHASONE SODIUM PHOSPHATE 6 MG: 10 INJECTION, SOLUTION INTRAMUSCULAR; INTRAVENOUS at 05:14

## 2023-01-01 RX ADMIN — CEFAZOLIN SODIUM 2000 MG: 2 SOLUTION INTRAVENOUS at 02:50

## 2023-01-01 RX ADMIN — HYDRALAZINE HYDROCHLORIDE 10 MG: 20 INJECTION, SOLUTION INTRAMUSCULAR; INTRAVENOUS at 18:34

## 2023-01-01 RX ADMIN — HYDRALAZINE HYDROCHLORIDE 10 MG: 20 INJECTION, SOLUTION INTRAMUSCULAR; INTRAVENOUS at 17:35

## 2023-01-01 RX ADMIN — NOREPINEPHRINE BITARTRATE 12 MCG/MIN: 1 INJECTION, SOLUTION, CONCENTRATE INTRAVENOUS at 00:07

## 2023-01-01 RX ADMIN — CHLORHEXIDINE GLUCONATE 15 ML: 1.2 SOLUTION ORAL at 21:27

## 2023-01-01 RX ADMIN — Medication 50 MCG/HR: at 02:20

## 2023-01-01 RX ADMIN — POTASSIUM CHLORIDE 40 MEQ: 1.5 SOLUTION ORAL at 16:00

## 2023-01-01 RX ADMIN — FENTANYL CITRATE 50 MCG: 50 INJECTION INTRAMUSCULAR; INTRAVENOUS at 18:30

## 2023-01-01 RX ADMIN — LEVETIRACETAM 500 MG: 100 INJECTION, SOLUTION INTRAVENOUS at 08:03

## 2023-01-01 RX ADMIN — GABAPENTIN 100 MG: 100 CAPSULE ORAL at 21:01

## 2023-01-01 RX ADMIN — POTASSIUM PHOSPHATE, MONOBASIC AND POTASSIUM PHOSPHATE, DIBASIC 21 MMOL: 224; 236 INJECTION, SOLUTION, CONCENTRATE INTRAVENOUS at 09:22

## 2023-01-01 RX ADMIN — FENTANYL CITRATE 100 MCG: 50 INJECTION INTRAMUSCULAR; INTRAVENOUS at 19:04

## 2023-01-01 RX ADMIN — IPRATROPIUM BROMIDE 0.5 MG: 0.5 SOLUTION RESPIRATORY (INHALATION) at 13:43

## 2023-01-01 RX ADMIN — POTASSIUM PHOSPHATE, MONOBASIC AND POTASSIUM PHOSPHATE, DIBASIC 30 MMOL: 224; 236 INJECTION, SOLUTION, CONCENTRATE INTRAVENOUS at 18:57

## 2023-01-01 RX ADMIN — GABAPENTIN 100 MG: 100 CAPSULE ORAL at 21:52

## 2023-01-01 RX ADMIN — NICOTINE 7 MG: 7 PATCH, EXTENDED RELEASE TRANSDERMAL at 09:22

## 2023-01-01 RX ADMIN — SODIUM CHLORIDE 1000 ML: 0.9 INJECTION, SOLUTION INTRAVENOUS at 19:33

## 2023-01-01 RX ADMIN — FENTANYL CITRATE 50 MCG: 50 INJECTION, SOLUTION INTRAMUSCULAR; INTRAVENOUS at 18:30

## 2023-01-01 RX ADMIN — Medication 50 MCG/HR: at 20:12

## 2023-01-01 RX ADMIN — SODIUM CHLORIDE 75 ML/HR: 0.9 INJECTION, SOLUTION INTRAVENOUS at 20:32

## 2023-01-01 RX ADMIN — DEXAMETHASONE SODIUM PHOSPHATE 4 MG: 4 INJECTION INTRA-ARTICULAR; INTRALESIONAL; INTRAMUSCULAR; INTRAVENOUS; SOFT TISSUE at 05:03

## 2023-01-01 RX ADMIN — MANNITOL 75 G: 12.5 INJECTION, SOLUTION INTRAVENOUS at 18:15

## 2023-01-01 RX ADMIN — DEXAMETHASONE SODIUM PHOSPHATE 4 MG: 10 INJECTION, SOLUTION INTRAMUSCULAR; INTRAVENOUS at 12:36

## 2023-01-01 RX ADMIN — FENTANYL CITRATE 50 MCG: 50 INJECTION INTRAMUSCULAR; INTRAVENOUS at 20:07

## 2023-01-01 RX ADMIN — DEXAMETHASONE SODIUM PHOSPHATE 4 MG: 10 INJECTION, SOLUTION INTRAMUSCULAR; INTRAVENOUS at 23:48

## 2023-01-01 RX ADMIN — FENTANYL CITRATE 50 MCG: 50 INJECTION INTRAMUSCULAR; INTRAVENOUS at 21:40

## 2023-01-01 RX ADMIN — HYDRALAZINE HYDROCHLORIDE 10 MG: 20 INJECTION, SOLUTION INTRAMUSCULAR; INTRAVENOUS at 14:21

## 2023-01-01 RX ADMIN — LEVETIRACETAM 500 MG: 500 TABLET, FILM COATED ORAL at 09:22

## 2023-01-01 RX ADMIN — IPRATROPIUM BROMIDE 0.5 MG: 0.5 SOLUTION RESPIRATORY (INHALATION) at 19:21

## 2023-01-01 RX ADMIN — LEVETIRACETAM 500 MG: 100 INJECTION, SOLUTION INTRAVENOUS at 08:32

## 2023-01-01 RX ADMIN — MIDAZOLAM 2 MG: 1 INJECTION INTRAMUSCULAR; INTRAVENOUS at 16:36

## 2023-01-01 RX ADMIN — Medication 20 MG: at 09:15

## 2023-01-01 RX ADMIN — Medication 1 TABLET: at 08:47

## 2023-01-01 RX ADMIN — SODIUM CHLORIDE, SODIUM GLUCONATE, SODIUM ACETATE, POTASSIUM CHLORIDE, MAGNESIUM CHLORIDE, SODIUM PHOSPHATE, DIBASIC, AND POTASSIUM PHOSPHATE 75 ML/HR: .53; .5; .37; .037; .03; .012; .00082 INJECTION, SOLUTION INTRAVENOUS at 17:04

## 2023-01-01 RX ADMIN — CHLORHEXIDINE GLUCONATE 15 ML: 1.2 SOLUTION ORAL at 08:31

## 2023-01-01 RX ADMIN — GLYCOPYRROLATE 0.1 MG: 0.2 INJECTION, SOLUTION INTRAMUSCULAR; INTRAVENOUS at 16:45

## 2023-01-01 RX ADMIN — DEXAMETHASONE SODIUM PHOSPHATE 6 MG: 10 INJECTION, SOLUTION INTRAMUSCULAR; INTRAVENOUS at 00:24

## 2023-01-01 RX ADMIN — POTASSIUM PHOSPHATE, MONOBASIC AND POTASSIUM PHOSPHATE, DIBASIC 21 MMOL: 224; 236 INJECTION, SOLUTION, CONCENTRATE INTRAVENOUS at 12:37

## 2023-01-01 RX ADMIN — HEPARIN SODIUM 5000 UNITS: 5000 INJECTION INTRAVENOUS; SUBCUTANEOUS at 21:01

## 2023-01-01 RX ADMIN — FAMOTIDINE 40 MG: 20 TABLET, FILM COATED ORAL at 12:28

## 2023-01-01 RX ADMIN — CHLORHEXIDINE GLUCONATE 15 ML: 1.2 SOLUTION ORAL at 09:15

## 2023-01-01 RX ADMIN — CHLORHEXIDINE GLUCONATE 15 ML: 1.2 SOLUTION ORAL at 12:29

## 2023-01-01 RX ADMIN — FENTANYL CITRATE 50 MCG: 50 INJECTION INTRAMUSCULAR; INTRAVENOUS at 19:39

## 2023-01-01 RX ADMIN — FENTANYL CITRATE 50 MCG: 50 INJECTION INTRAMUSCULAR; INTRAVENOUS at 19:42

## 2023-01-01 RX ADMIN — POTASSIUM CHLORIDE 40 MEQ: 29.8 INJECTION, SOLUTION INTRAVENOUS at 16:03

## 2023-01-01 RX ADMIN — LEVETIRACETAM 500 MG: 100 INJECTION, SOLUTION INTRAVENOUS at 09:18

## 2023-01-01 RX ADMIN — PROPOFOL 50 MCG/KG/MIN: 10 INJECTION, EMULSION INTRAVENOUS at 20:08

## 2023-01-01 RX ADMIN — PANTOPRAZOLE SODIUM 40 MG: 40 INJECTION, POWDER, FOR SOLUTION INTRAVENOUS at 08:50

## 2023-01-01 RX ADMIN — INSULIN LISPRO 2 UNITS: 100 INJECTION, SOLUTION INTRAVENOUS; SUBCUTANEOUS at 23:47

## 2023-01-01 RX ADMIN — BISACODYL 10 MG: 10 SUPPOSITORY RECTAL at 09:15

## 2023-01-01 RX ADMIN — LEVETIRACETAM 500 MG: 100 INJECTION, SOLUTION INTRAVENOUS at 23:38

## 2023-01-01 RX ADMIN — NICOTINE 7 MG: 7 PATCH, EXTENDED RELEASE TRANSDERMAL at 09:17

## 2023-01-01 RX ADMIN — LEVALBUTEROL HYDROCHLORIDE 1.25 MG: 1.25 SOLUTION RESPIRATORY (INHALATION) at 13:43

## 2023-01-01 RX ADMIN — NICOTINE 7 MG: 7 PATCH, EXTENDED RELEASE TRANSDERMAL at 08:31

## 2023-01-01 RX ADMIN — GABAPENTIN 100 MG: 100 CAPSULE ORAL at 23:50

## 2023-01-01 RX ADMIN — THIAMINE HCL TAB 100 MG 100 MG: 100 TAB at 12:28

## 2023-01-01 RX ADMIN — DEXAMETHASONE SODIUM PHOSPHATE 4 MG: 4 INJECTION INTRA-ARTICULAR; INTRALESIONAL; INTRAMUSCULAR; INTRAVENOUS; SOFT TISSUE at 11:01

## 2023-01-01 RX ADMIN — FOLIC ACID 1 MG: 1 TABLET ORAL at 09:16

## 2023-01-01 RX ADMIN — NOREPINEPHRINE BITARTRATE 4 MG: 1 INJECTION, SOLUTION, CONCENTRATE INTRAVENOUS at 18:50

## 2023-01-01 RX ADMIN — POTASSIUM PHOSPHATE, MONOBASIC AND POTASSIUM PHOSPHATE, DIBASIC 21 MMOL: 224; 236 INJECTION, SOLUTION, CONCENTRATE INTRAVENOUS at 23:44

## 2023-01-01 RX ADMIN — MANNITOL 50 G: 250 INJECTION, SOLUTION INTRAVENOUS at 09:39

## 2023-01-01 RX ADMIN — CHLORHEXIDINE GLUCONATE 15 ML: 1.2 SOLUTION ORAL at 04:17

## 2023-01-01 RX ADMIN — IPRATROPIUM BROMIDE 0.5 MG: 0.5 SOLUTION RESPIRATORY (INHALATION) at 13:45

## 2023-01-09 DIAGNOSIS — R10.13 DYSPEPSIA: ICD-10-CM

## 2023-01-09 RX ORDER — FAMOTIDINE 40 MG/1
TABLET, FILM COATED ORAL
Qty: 30 TABLET | Refills: 3 | Status: SHIPPED | OUTPATIENT
Start: 2023-01-09

## 2023-01-13 ENCOUNTER — VBI (OUTPATIENT)
Dept: ADMINISTRATIVE | Facility: OTHER | Age: 53
End: 2023-01-13

## 2023-01-16 ENCOUNTER — TELEPHONE (OUTPATIENT)
Dept: PULMONOLOGY | Facility: CLINIC | Age: 53
End: 2023-01-16

## 2023-01-16 NOTE — TELEPHONE ENCOUNTER
Left a voicemail for patient to schedule a 6 month follow up at our [facility] office with Dr Mackenzie Baumann or [facility] in March

## 2023-01-23 DIAGNOSIS — K21.00 GASTROESOPHAGEAL REFLUX DISEASE WITH ESOPHAGITIS: ICD-10-CM

## 2023-01-23 RX ORDER — OMEPRAZOLE 40 MG/1
CAPSULE, DELAYED RELEASE ORAL
Qty: 30 CAPSULE | Refills: 2 | Status: SHIPPED | OUTPATIENT
Start: 2023-01-23

## 2023-02-04 DIAGNOSIS — G89.29 CHRONIC BILATERAL LOW BACK PAIN WITHOUT SCIATICA: ICD-10-CM

## 2023-02-04 DIAGNOSIS — M54.50 CHRONIC BILATERAL LOW BACK PAIN WITHOUT SCIATICA: ICD-10-CM

## 2023-02-06 RX ORDER — NAPROXEN 500 MG/1
TABLET ORAL
Qty: 30 TABLET | Refills: 1 | Status: SHIPPED | OUTPATIENT
Start: 2023-02-06

## 2023-02-09 DIAGNOSIS — J44.9 CHRONIC OBSTRUCTIVE PULMONARY DISEASE, UNSPECIFIED COPD TYPE (HCC): ICD-10-CM

## 2023-02-09 RX ORDER — ALBUTEROL SULFATE 90 UG/1
AEROSOL, METERED RESPIRATORY (INHALATION)
Qty: 18 G | Refills: 5 | Status: SHIPPED | OUTPATIENT
Start: 2023-02-09

## 2023-02-10 DIAGNOSIS — I10 ESSENTIAL HYPERTENSION: ICD-10-CM

## 2023-02-10 RX ORDER — METOPROLOL SUCCINATE 25 MG/1
25 TABLET, EXTENDED RELEASE ORAL DAILY
Qty: 90 TABLET | Refills: 1 | Status: SHIPPED | OUTPATIENT
Start: 2023-02-10

## 2023-02-22 ENCOUNTER — TELEPHONE (OUTPATIENT)
Dept: PAIN MEDICINE | Facility: MEDICAL CENTER | Age: 53
End: 2023-02-22

## 2023-02-22 NOTE — TELEPHONE ENCOUNTER
Caller: Steffanie     Doctor: Laureen Gao    Reason for call: patient rescheduled consult 2/23 10:30 am for April 24     Sending appt just incase you want to fill it up asap

## 2023-03-08 DIAGNOSIS — J30.9 ALLERGIC RHINITIS: ICD-10-CM

## 2023-03-08 RX ORDER — AZELASTINE HYDROCHLORIDE 137 UG/1
SPRAY, METERED NASAL
Qty: 30 ML | Refills: 5 | Status: SHIPPED | OUTPATIENT
Start: 2023-03-08

## 2023-03-29 ENCOUNTER — OFFICE VISIT (OUTPATIENT)
Dept: PULMONOLOGY | Facility: CLINIC | Age: 53
End: 2023-03-29

## 2023-03-29 VITALS
WEIGHT: 87.6 LBS | DIASTOLIC BLOOD PRESSURE: 78 MMHG | BODY MASS INDEX: 17.66 KG/M2 | HEART RATE: 89 BPM | OXYGEN SATURATION: 100 % | HEIGHT: 59 IN | SYSTOLIC BLOOD PRESSURE: 118 MMHG

## 2023-03-29 DIAGNOSIS — J45.40 UNCONTROLLED MODERATE PERSISTENT ASTHMA: ICD-10-CM

## 2023-03-29 DIAGNOSIS — J44.9 CHRONIC OBSTRUCTIVE PULMONARY DISEASE, UNSPECIFIED COPD TYPE (HCC): Primary | ICD-10-CM

## 2023-03-29 DIAGNOSIS — F17.210 NICOTINE DEPENDENCE, CIGARETTES, UNCOMPLICATED: ICD-10-CM

## 2023-03-29 DIAGNOSIS — J30.89 NON-SEASONAL ALLERGIC RHINITIS, UNSPECIFIED TRIGGER: ICD-10-CM

## 2023-03-29 RX ORDER — FLUTICASONE FUROATE, UMECLIDINIUM BROMIDE AND VILANTEROL TRIFENATATE 100; 62.5; 25 UG/1; UG/1; UG/1
1 POWDER RESPIRATORY (INHALATION) DAILY
Qty: 60 EACH | Refills: 5 | Status: SHIPPED | OUTPATIENT
Start: 2023-03-29 | End: 2023-04-28

## 2023-03-29 NOTE — PROGRESS NOTES
Office Progress Note - Pulmonary    Elke Alvarez 47 y o  female MRN: [id number]    Encounter: [id number]      Assessment:  • Chronic obstructive pulmonary disease  • Allergic rhinitis  • Ongoing tobacco use  Plan:   • Smoking cessation  • Trelegy Ellipta 100, 1 inhalation once a day  • Albuterol rescue inhaler 2 inhalations 4 times a day as needed  • Fluticasone nasal spray 2 sprays to each nostril once a day  • Astelin nasal spray 2 sprays to each nostril twice a day  • Follow-up in 6 months  Discussion:   The patient's COPD is in remission  I have maintained her on the Trelegy Ellipta 100, 1 inhalation once a day  She will use the albuterol rescue inhaler 2 inhalations 4 times a day as needed  She continues to struggle with smoking  It seems that her social situation is not consistent and she goes through a lot of stressful situations  For this reason she is intermittently smoking  I do not think she will be able to quit smoking completely  However I reminded her of the benefit to quit completely  Her allergic rhinitis is fairly treated  I have maintained her on the fluticasone nasal spray 2 sprays to each nostril once a day and Astelin nasal spray 2 sprays to each nostril twice a day  I have renewed her prescriptions  I will see her in 6 months  Subjective: The patient is here for a follow-up visit  She has occasional cough  Occasional sputum production mostly white  Denies hemoptysis  No chest pain  She is using the Trelegy once a day  No postnasal dripping or nasal congestion  She is on the fluticasone nasal spray 2 sprays to each nostril once a day and Astelin nasal spray 2 sprays to each nostril twice a day  Occasionally she needs to use her rescue inhaler  Review of systems:  A 12 point system review is done and aside from what is stated above the rest of the review of systems is negative  Family history and social history are reviewed      Medications list is "reviewed  Vitals: Blood pressure 118/78, pulse 89, height 4' 11\" (1 499 m), weight 39 7 kg (87 lb 9 6 oz), last menstrual period 06/02/2019, SpO2 100 %  ,     Physical Exam  Gen: Awake, alert, oriented x 3, no acute distress  HEENT: Mucous membranes moist, no oral lesions, no thrush  NECK: No accessory muscle use, JVP not elevated  Cardiac: Regular, single S1, single S2, no murmurs, no rubs, no gallops  Lungs: Decreased breath sounds  No wheezing or rhonchi  Abdomen: normoactive bowel sounds, soft nontender, nondistended, no rebound or rigidity, no guarding  Extremities: no cyanosis, no clubbing, no edema  Neuro:  Grossly nonfocal   Skin:  No rash      Lab Results   Component Value Date    SODIUM 132 (L) 11/08/2022    K 4 0 11/08/2022    CL 99 11/08/2022    CO2 26 11/08/2022    BUN 8 11/08/2022    CREATININE 0 64 11/08/2022    CALCIUM 10 0 11/08/2022     Lab Results   Component Value Date    WBC 4 66 11/08/2022    HGB 15 5 (H) 11/08/2022    HCT 45 7 11/08/2022    MCV 94 11/08/2022     11/08/2022       "

## 2023-04-24 ENCOUNTER — CONSULT (OUTPATIENT)
Dept: PAIN MEDICINE | Facility: MEDICAL CENTER | Age: 53
End: 2023-04-24

## 2023-04-24 ENCOUNTER — OFFICE VISIT (OUTPATIENT)
Dept: FAMILY MEDICINE CLINIC | Facility: CLINIC | Age: 53
End: 2023-04-24

## 2023-04-24 VITALS
HEIGHT: 59 IN | HEART RATE: 64 BPM | SYSTOLIC BLOOD PRESSURE: 146 MMHG | BODY MASS INDEX: 17.94 KG/M2 | WEIGHT: 89 LBS | DIASTOLIC BLOOD PRESSURE: 82 MMHG

## 2023-04-24 VITALS
HEIGHT: 59 IN | BODY MASS INDEX: 18.14 KG/M2 | OXYGEN SATURATION: 97 % | RESPIRATION RATE: 18 BRPM | SYSTOLIC BLOOD PRESSURE: 128 MMHG | WEIGHT: 90 LBS | HEART RATE: 63 BPM | TEMPERATURE: 97.1 F | DIASTOLIC BLOOD PRESSURE: 82 MMHG

## 2023-04-24 DIAGNOSIS — Z12.2 ENCOUNTER FOR SCREENING FOR LUNG CANCER: ICD-10-CM

## 2023-04-24 DIAGNOSIS — Z86.19 HISTORY OF HEPATITIS C: ICD-10-CM

## 2023-04-24 DIAGNOSIS — Z12.31 ENCOUNTER FOR SCREENING MAMMOGRAM FOR MALIGNANT NEOPLASM OF BREAST: ICD-10-CM

## 2023-04-24 DIAGNOSIS — R10.13 DYSPEPSIA: ICD-10-CM

## 2023-04-24 DIAGNOSIS — Z12.11 COLON CANCER SCREENING: ICD-10-CM

## 2023-04-24 DIAGNOSIS — R73.9 HYPERGLYCEMIA: ICD-10-CM

## 2023-04-24 DIAGNOSIS — M54.16 LUMBAR RADICULITIS: ICD-10-CM

## 2023-04-24 DIAGNOSIS — Z00.00 ANNUAL PHYSICAL EXAM: Primary | ICD-10-CM

## 2023-04-24 NOTE — PROGRESS NOTES
ADULT ANNUAL PHYSICAL  [address] [facility] PRIMARY CARE [facility]    NAME: Steffanie Mendez  AGE: 47 y o  SEX: female  : 1970     DATE: 2023     Assessment and Plan:     Problem List Items Addressed This Visit    None  Visit Diagnoses     Annual physical exam    -  Primary    Encounter for screening mammogram for malignant neoplasm of breast        Relevant Orders    Mammo screening bilateral w 3d & cad    Colon cancer screening        Relevant Orders    Ambulatory Referral to Gastroenterology    Encounter for screening for lung cancer        Relevant Orders    CT lung screening program    Dyspepsia        Relevant Orders    Ambulatory Referral to Gastroenterology    CBC and differential    History of hepatitis C        Relevant Orders    Hepatitis C RNA, Quantitative PCR, [facility]    Hyperglycemia        Relevant Orders    Comprehensive metabolic panel          Immunizations and preventive care screenings were discussed with patient today  Appropriate education was printed on patient's after visit summary  Counseling:  · Preventative screening  Blood work from November was reviewed with patient   Lab studies from   Glucose level was high at 137  Hemoglobin A1c was 5 1  LDL had improved to 122  Patient had quit smoking and has been doing very well  She has been following with her pulmonologist as well  Pain management consult from today was reviewed  Patient is due for mammogram   She would also be referred to GI for an EGD and a colonoscopy  Patient did receive treatment for hep C  Follow-up RNA  was negative  No follow-ups on file       Chief Complaint:     Chief Complaint   Patient presents with   • Physical Exam     BMI Follow up      History of Present Illness:     Adult Annual Physical   Patient here for a comprehensive physical exam  The patient reports problems - She still gets dyspepsia if she does not use a PPI and H2 blocker  We will request EGD with the next colonoscopy  Patient does use Advil alternated with naproxen which I strongly discouraged her from  Patient has seen pain management today for ongoing back pain       Diet and Physical Activity  · Diet/Nutrition: Regular  · Exercise: no formal exercise  Depression Screening  PHQ-2/9 Depression Screening         General Health  · Sleep: gets 7-8 hours of sleep on average  · Hearing: normal - bilateral   · Vision: no vision problems  · Dental: brushes teeth twice daily  /GYN Health  · Patient is: postmenopausal patient follows her gynecologist Pavan Tang  · Last menstrual period: As above contraceptive method: As above  Review of Systems:     Review of Systems   Past Medical History:     Past Medical History:   Diagnosis Date   • Anxiety    • Depression    • Hepatitis C       Past Surgical History:     Past Surgical History:   Procedure Laterality Date   • BUNIONECTOMY Left       Social History:     Social History     Socioeconomic History   • Marital status: Single     Spouse name: None   • Number of children: None   • Years of education: None   • Highest education level: None   Occupational History   • None   Tobacco Use   • Smoking status: Former     Packs/day: 1 50     Years: 15 00     Pack years: 22 50     Types: Cigarettes     Start date: 5     Quit date: 2018     Years since quittin 3   • Smokeless tobacco: Never   • Tobacco comments:     Stop smoking a month ago /Dec  2018   Vaping Use   • Vaping Use: Never used   Substance and Sexual Activity   • Alcohol use:  Yes     Alcohol/week: 6 0 standard drinks     Types: 6 Cans of beer per week     Comment: occasional   • Drug use: Not Currently   • Sexual activity: Yes     Partners: Male   Other Topics Concern   • None   Social History Narrative   • None     Social Determinants of Health     Financial Resource Strain: Not on file   Food Insecurity: Not on file   Transportation Needs: Not on file   Physical Activity: Not on file   Stress: Not on file   Social Connections: Not on file   Intimate Partner Violence: Not on file   Housing Stability: Not on file      Family History:     Family History   Problem Relation Age of Onset   • Diabetes Mother    • COPD Mother    • Lung cancer Mother    • Lung cancer Father       Current Medications:     Current Outpatient Medications   Medication Sig Dispense Refill   • albuterol (2 5 mg/3 mL) 0 083 % nebulizer solution Take 1 vial (2 5 mg total) by nebulization 3 (three) times a day 90 vial 5   • albuterol (PROVENTIL HFA,VENTOLIN HFA) 90 mcg/act inhaler INHALE 2 PUFFS EVERY 6 HOURS AS NEEDED FOR WHEEZING 18 g 5   • Azelastine HCl 137 MCG/SPRAY SOLN 2 SPRAYS INTO EACH NOSTRIL DAILY USE IN EACH NOSTRIL AS DIRECTED 30 mL 5   • famotidine (PEPCID) 40 MG tablet TAKE 1 TABLET BY MOUTH EVERY DAY 30 tablet 3   • fluticasone (FLONASE) 50 mcg/act nasal spray 2 sprays into each nostril daily 16 mL 5   • fluticasone-umeclidinium-vilanterol (Trelegy Ellipta) 100-62 5-25 mcg/actuation inhaler Inhale 1 puff daily Rinse mouth after use  60 each 5   • folic acid (FOLVITE) 1 mg tablet TAKE 1 TABLET BY MOUTH EVERY DAY 90 tablet 1   • gabapentin (NEURONTIN) 100 mg capsule TAKE 1 CAPSULE BY MOUTH EVERYDAY AT BEDTIME (Patient not taking: Reported on 3/29/2023) 30 capsule 0   • Ibuprofen (ADVIL PO) Take by mouth Pt takes three Advil daily     • meloxicam (Mobic) 15 mg tablet Take 1 tablet (15 mg total) by mouth daily (Patient not taking: Reported on 4/24/2023) 30 tablet 1   • methylPREDNISolone 4 MG tablet therapy pack Use as directed on package (Patient not taking: Reported on 3/29/2023) 21 tablet 0   • metoprolol succinate (TOPROL-XL) 25 mg 24 hr tablet TAKE 1 TABLET (25 MG TOTAL) BY MOUTH DAILY   90 tablet 1   • naproxen (NAPROSYN) 500 mg tablet TAKE 1 TABLET BY MOUTH DAILY AS NEEDED FOR MILD PAIN 30 tablet 1   • omeprazole (PriLOSEC) 40 MG capsule TAKE 1 CAPSULE BY MOUTH EVERY "DAY 30 capsule 2   • PARoxetine (PAXIL) 10 mg tablet TAKE 1 TABLET BY MOUTH EVERY DAY (Patient not taking: Reported on 9/13/2022) 90 tablet 0   • thiamine ([facility] B-1) 100 MG tablet Take 1 tablet (100 mg total) by mouth daily (Patient not taking: Reported on 9/13/2022) 30 tablet 0     No current facility-administered medications for this visit  Allergies: Allergies   Allergen Reactions   • No Active Allergies       Physical Exam:     /82   Pulse 63   Temp (!) 97 1 °F (36 2 °C)   Resp 18   Ht 4' 11\" (1 499 m)   Wt 40 8 kg (90 lb)   LMP 06/02/2019 (Exact Date)   SpO2 97%   BMI 18 18 kg/m²     Physical Exam  Vitals and nursing note reviewed  Constitutional:       General: She is not in acute distress  Appearance: She is well-developed  HENT:      Head: Normocephalic and atraumatic  Eyes:      Conjunctiva/sclera: Conjunctivae normal    Neck:      Vascular: No carotid bruit  Cardiovascular:      Rate and Rhythm: Normal rate and regular rhythm  Heart sounds: No murmur heard  Pulmonary:      Effort: Pulmonary effort is normal  No respiratory distress  Breath sounds: No wheezing or rales  Comments: Decreased breath sounds  Abdominal:      General: There is no distension  Palpations: Abdomen is soft  Tenderness: There is no abdominal tenderness  There is no right CVA tenderness, left CVA tenderness or guarding  Musculoskeletal:         General: No swelling  Cervical back: Neck supple  Right lower leg: No edema  Left lower leg: No edema  Lymphadenopathy:      Cervical: No cervical adenopathy  Skin:     General: Skin is warm and dry  Capillary Refill: Capillary refill takes less than 2 seconds  Neurological:      Mental Status: She is alert and oriented to person, place, and time            Everardo Steiner MD  [address]  "

## 2023-04-24 NOTE — PATIENT INSTRUCTIONS
Wellness Visit for Adults   AMBULATORY CARE:   A wellness visit  is when you see your healthcare provider to get screened for health problems  Your healthcare provider will also give you advice on how to stay healthy  Write down your questions so you remember to ask them  Ask your healthcare provider how often you should have a wellness visit  What happens at a wellness visit:  Your healthcare provider will ask about your health, and your family history of health problems  This includes high blood pressure, heart disease, and cancer  He or she will ask if you have symptoms that concern you, if you smoke, and about your mood  You may also be asked about your intake of medicines, supplements, food, and alcohol  Any of the following may be done:  • Your weight  will be checked  Your height may also be checked so your body mass index (BMI) can be calculated  Your BMI shows if you are at a healthy weight  • Your blood pressure  and heart rate will be checked  Your temperature may also be checked  • Blood and urine tests  may be done  Blood tests may be done to check your cholesterol levels  Abnormal cholesterol levels increase your risk for heart disease and stroke  You may also need a blood or urine test to check for diabetes if you are at increased risk  Urine tests may be done to look for signs of an infection or kidney disease  • A physical exam  includes checking your heartbeat and lungs with a stethoscope  Your healthcare provider may also check your skin to look for sun damage  • Screening tests  may be recommended  A screening test is done to check for diseases that may not cause symptoms  The screening tests you may need depend on your age, gender, family history, and lifestyle habits  For example, colorectal screening may be recommended if you are 48years old or older  Screening tests you need if you are a woman:   • A Pap smear  is used to screen for cervical cancer   Pap smears are usually code stroke/confusion done every 3 to 5 years depending on your age  You may need them more often if you have had abnormal Pap smear test results in the past  Ask your healthcare provider how often you should have a Pap smear  • A mammogram  is an x-ray of your breasts to screen for breast cancer  Experts recommend mammograms every 2 years starting at age 48 years  You may need a mammogram at age 52 years or younger if you have an increased risk for breast cancer  Talk to your healthcare provider about when you should start having mammograms and how often you need them  Vaccines you may need:   • Get an influenza vaccine  every year  The influenza vaccine protects you from the flu  Several types of viruses cause the flu  The viruses change over time, so new vaccines are made each year  • Get a tetanus-diphtheria (Td) booster vaccine  every 10 years  This vaccine protects you against tetanus and diphtheria  Tetanus is a severe infection that may cause painful muscle spasms and lockjaw  Diphtheria is a severe bacterial infection that causes a thick covering in the back of your mouth and throat  • Get a human papillomavirus (HPV) vaccine  if you are female and aged 23 to 32 or male 23 to 24 and never received it  This vaccine protects you from HPV infection  HPV is the most common infection spread by sexual contact  HPV may also cause vaginal, penile, and anal cancers  • Get a pneumococcal vaccine  if you are aged 72 years or older  The pneumococcal vaccine is an injection given to protect you from pneumococcal disease  Pneumococcal disease is an infection caused by pneumococcal bacteria  The infection may cause pneumonia, meningitis, or an ear infection  • Get a shingles vaccine  if you are 60 or older, even if you have had shingles before  The shingles vaccine is an injection to protect you from the varicella-zoster virus  This is the same virus that causes chickenpox   Shingles is a painful rash that develops in people who had chickenpox or have been exposed to the virus  How to eat healthy:  My Plate is a model for planning healthy meals  It shows the types and amounts of foods that should go on your plate  Fruits and vegetables make up about half of your plate, and grains and protein make up the other half  A serving of dairy is included on the side of your plate  The amount of calories and serving sizes you need depends on your age, gender, weight, and height  Examples of healthy foods are listed below:  • Eat a variety of vegetables  such as dark green, red, and orange vegetables  You can also include canned vegetables low in sodium (salt) and frozen vegetables without added butter or sauces  • Eat a variety of fresh fruits , canned fruit in 100% juice, frozen fruit, and dried fruit  • Include whole grains  At least half of the grains you eat should be whole grains  Examples include whole-wheat bread, wheat pasta, brown rice, and whole-grain cereals such as oatmeal     • Eat a variety of protein foods such as seafood (fish and shellfish), lean meat, and poultry without skin (turkey and chicken)  Examples of lean meats include pork leg, shoulder, or tenderloin, and beef round, sirloin, tenderloin, and extra lean ground beef  Other protein foods include eggs and egg substitutes, beans, peas, soy products, nuts, and seeds  • Choose low-fat dairy products such as skim or 1% milk or low-fat yogurt, cheese, and cottage cheese  • Limit unhealthy fats  such as butter, hard margarine, and shortening  Exercise:  Exercise at least 30 minutes per day on most days of the week  Some examples of exercise include walking, biking, dancing, and swimming  You can also fit in more physical activity by taking the stairs instead of the elevator or parking farther away from stores  Include muscle strengthening activities 2 days each week  Regular exercise provides many health benefits   It helps you manage your weight, and decreases your risk for type 2 diabetes, heart disease, stroke, and high blood pressure  Exercise can also help improve your mood  Ask your healthcare provider about the best exercise plan for you  General health and safety guidelines:   • Do not smoke  Nicotine and other chemicals in cigarettes and cigars can cause lung damage  Ask your healthcare provider for information if you currently smoke and need help to quit  E-cigarettes or smokeless tobacco still contain nicotine  Talk to your healthcare provider before you use these products  • Limit alcohol  A drink of alcohol is 12 ounces of beer, 5 ounces of wine, or 1½ ounces of liquor  • Lose weight, if needed  Being overweight increases your risk of certain health conditions  These include heart disease, high blood pressure, type 2 diabetes, and certain types of cancer  • Protect your skin  Do not sunbathe or use tanning beds  Use sunscreen with a SPF 15 or higher  Apply sunscreen at least 15 minutes before you go outside  Reapply sunscreen every 2 hours  Wear protective clothing, hats, and sunglasses when you are outside  • Drive safely  Always wear your seatbelt  Make sure everyone in your car wears a seatbelt  A seatbelt can save your life if you are in an accident  Do not use your cell phone when you are driving  This could distract you and cause an accident  Pull over if you need to make a call or send a text message  • Practice safe sex  Use latex condoms if are sexually active and have more than one partner  Your healthcare provider may recommend screening tests for sexually transmitted infections (STIs)  • Wear helmets, lifejackets, and protective gear  Always wear a helmet when you ride a bike or motorcycle, go skiing, or play sports that could cause a head injury  Wear protective equipment when you play sports  Wear a lifejacket when you are on a boat or doing water sports      © Copyright [employer] 2022 Information is for End User's use only and may not be sold, redistributed or otherwise used for commercial purposes  The above information is an  only  It is not intended as medical advice for individual conditions or treatments  Talk to your doctor, nurse or pharmacist before following any medical regimen to see if it is safe and effective for you

## 2023-04-24 NOTE — PROGRESS NOTES
Assessment  1  Lumbar radiculitis        Plan  1  Obtain lumbar spine x-ray  2  Initiate physical therapy  3  If the patient continues with significant pain at the conclusion of physical therapy we will see her back for follow-up visit and obtain MRI of the lumbar spine at that time    My impressions and treatment recommendations were discussed in detail with the patient who verbalized understanding and had no further questions  Discharge instructions were provided  I personally saw and examined the patient and I agree with the above discussed plan of care  Orders Placed This Encounter   Procedures   • X-ray lumbar spine complete 4+ views     Standing Status:   Future     Standing Expiration Date:   4/24/2027     Scheduling Instructions:      Bring along any outside films relating to this procedure  Order Specific Question:   When should the test be performed? Answer:   Elective- non urgent     Order Specific Question:   Is the patient pregnant? Answer:   No   • Ambulatory referral to Physical Therapy     Standing Status:   Future     Standing Expiration Date:   4/24/2024     Referral Priority:   Routine     Referral Type:   Physical Therapy     Referral Reason:   Specialty Services Required     Requested Specialty:   Physical Therapy     Number of Visits Requested:   1     Expiration Date:   4/24/2024     New Medications Ordered This Visit   Medications   • Ibuprofen (ADVIL PO)     Sig: Take by mouth Pt takes three Advil daily       History of Present Illness    Ron Matthew is a 47 y o  female seen in consultation at the request of Dr Rhona Corbin regarding back and radiating right leg pain  Patient's been experiencing symptoms over the past 8 months without any specific inciting event or trauma  She is describing pain in the back that radiates down the entire right lateral lower extremity    This is moderate to severe in intensity rated as an 8/10 which is nearly constant and worse in the morning and evening characterized as cramping numbness pins-and-needles  She does describe some subjective lower extremity weakness but has not had any falls and does not utilize an assistive device for ambulation  Aggravating factors include lying down standing bending walking coughing and sneezing  Alleviating factors include relaxation  No diagnostic imaging of the spine is available for review  She has tried local application of heat or ice without relief  She has not tried physical therapy or other conservative care at this point  Social history positive for tobacco marijuana and alcohol use  Currently using acetaminophen aspirin ibuprofen meloxicam naproxen and gabapentin for pain relief  She stopped using gabapentin as she did not find any benefit from that  We did discuss the option of increasing the dosing which she may try at nighttime  I have personally reviewed and/or updated the patient's past medical history, past surgical history, family history, social history, current medications, allergies, and vital signs today  Review of Systems   Constitutional: Negative for unexpected weight change  HENT: Negative for hearing loss and sore throat  Eyes: Negative for pain and visual disturbance  Respiratory: Positive for cough, shortness of breath and wheezing  Cardiovascular: Negative for leg swelling  Gastrointestinal: Negative for abdominal pain, nausea and vomiting  Endocrine: Negative for polyphagia and polyuria  Genitourinary: Negative for difficulty urinating and hematuria  Musculoskeletal: Positive for back pain  Negative for joint swelling, myalgias and neck pain  Skin: Negative for rash  Neurological: Negative for weakness and numbness  Hematological: Does not bruise/bleed easily  Psychiatric/Behavioral: Negative for decreased concentration  The patient is nervous/anxious          Patient Active Problem List   Diagnosis   • Asthma   • Gastroesophageal reflux disease with hiatal hernia   • Uncontrolled moderate persistent asthma   • Simple chronic bronchitis (HCC)   • Hepatitis C antibody test positive   • Other dysphagia   • Depression       Past Medical History:   Diagnosis Date   • Anxiety    • Depression    • Hepatitis C        Past Surgical History:   Procedure Laterality Date   • BUNIONECTOMY Left        Family History   Problem Relation Age of Onset   • Diabetes Mother    • COPD Mother    • Lung cancer Mother    • Lung cancer Father        Social History     Occupational History   • Not on file   Tobacco Use   • Smoking status: Former     Packs/day: 1 50     Years: 15 00     Pack years: 22 50     Types: Cigarettes     Start date: 5     Quit date: 2018     Years since quittin 3   • Smokeless tobacco: Never   • Tobacco comments:     Stop smoking a month ago /Dec  2018   Vaping Use   • Vaping Use: Never used   Substance and Sexual Activity   • Alcohol use: Yes     Alcohol/week: 6 0 standard drinks     Types: 6 Cans of beer per week     Comment: occasional   • Drug use: Not Currently   • Sexual activity: Yes     Partners: Male       Current Outpatient Medications on File Prior to Visit   Medication Sig   • albuterol (2 5 mg/3 mL) 0 083 % nebulizer solution Take 1 vial (2 5 mg total) by nebulization 3 (three) times a day   • albuterol (PROVENTIL HFA,VENTOLIN HFA) 90 mcg/act inhaler INHALE 2 PUFFS EVERY 6 HOURS AS NEEDED FOR WHEEZING   • Azelastine HCl 137 MCG/SPRAY SOLN 2 SPRAYS INTO EACH NOSTRIL DAILY USE IN EACH NOSTRIL AS DIRECTED   • famotidine (PEPCID) 40 MG tablet TAKE 1 TABLET BY MOUTH EVERY DAY   • fluticasone (FLONASE) 50 mcg/act nasal spray 2 sprays into each nostril daily   • fluticasone-umeclidinium-vilanterol (Trelegy Ellipta) 100-62 5-25 mcg/actuation inhaler Inhale 1 puff daily Rinse mouth after use     • folic acid (FOLVITE) 1 mg tablet TAKE 1 TABLET BY MOUTH EVERY DAY   • Ibuprofen (ADVIL PO) Take by mouth Pt takes three Advil daily   • "metoprolol succinate (TOPROL-XL) 25 mg 24 hr tablet TAKE 1 TABLET (25 MG TOTAL) BY MOUTH DAILY  • naproxen (NAPROSYN) 500 mg tablet TAKE 1 TABLET BY MOUTH DAILY AS NEEDED FOR MILD PAIN   • omeprazole (PriLOSEC) 40 MG capsule TAKE 1 CAPSULE BY MOUTH EVERY DAY   • gabapentin (NEURONTIN) 100 mg capsule TAKE 1 CAPSULE BY MOUTH EVERYDAY AT BEDTIME (Patient not taking: Reported on 3/29/2023)   • meloxicam (Mobic) 15 mg tablet Take 1 tablet (15 mg total) by mouth daily (Patient not taking: Reported on 4/24/2023)   • methylPREDNISolone 4 MG tablet therapy pack Use as directed on package (Patient not taking: Reported on 3/29/2023)   • PARoxetine (PAXIL) 10 mg tablet TAKE 1 TABLET BY MOUTH EVERY DAY (Patient not taking: Reported on 9/13/2022)   • thiamine ([employer] B-1) 100 MG tablet Take 1 tablet (100 mg total) by mouth daily (Patient not taking: Reported on 9/13/2022)     No current facility-administered medications on file prior to visit  Allergies   Allergen Reactions   • No Active Allergies        Physical Exam    /82   Pulse 64   Ht 4' 11\" (1 499 m)   Wt 40 4 kg (89 lb)   LMP 06/02/2019 (Exact Date)   BMI 17 98 kg/m²     Constitutional: normal, well developed, well nourished, alert, in no distress and non-toxic and no overt pain behavior    Eyes: anicteric  HEENT: grossly intact  Neck:  symmetric, trachea midline and no masses   Pulmonary:even and unlabored  Cardiovascular:No edema or pitting edema present  Skin:Normal without rashes or lesions and well hydrated  Psychiatric:Mood and affect appropriate  Neurologic:Cranial Nerves II-XII grossly intact, bilateral lower extremity muscle stretch reflexes are absent at the knees and ankles, negative straight leg raise from a seated position, bilateral lower extremity strength is normal  Musculoskeletal:normal, no significant pain with spinal range of motion which is full, patient able to stand on heels and toes and ambulates without any antalgia    Imaging  "

## 2023-05-21 ENCOUNTER — APPOINTMENT (EMERGENCY)
Dept: CT IMAGING | Facility: HOSPITAL | Age: 53
End: 2023-05-21

## 2023-05-21 ENCOUNTER — APPOINTMENT (EMERGENCY)
Dept: RADIOLOGY | Facility: HOSPITAL | Age: 53
End: 2023-05-21

## 2023-05-21 ENCOUNTER — HOSPITAL ENCOUNTER (EMERGENCY)
Facility: HOSPITAL | Age: 53
End: 2023-05-21
Attending: EMERGENCY MEDICINE

## 2023-05-21 VITALS
HEART RATE: 72 BPM | TEMPERATURE: 98.2 F | SYSTOLIC BLOOD PRESSURE: 158 MMHG | OXYGEN SATURATION: 96 % | RESPIRATION RATE: 18 BRPM | DIASTOLIC BLOOD PRESSURE: 80 MMHG

## 2023-05-21 DIAGNOSIS — G93.6 VASOGENIC BRAIN EDEMA (HCC): ICD-10-CM

## 2023-05-21 DIAGNOSIS — G93.40 ENCEPHALOPATHY: ICD-10-CM

## 2023-05-21 DIAGNOSIS — R90.0 INTRACRANIAL MASS: Primary | ICD-10-CM

## 2023-05-21 PROBLEM — J44.9 COPD (CHRONIC OBSTRUCTIVE PULMONARY DISEASE) (HCC): Chronic | Status: ACTIVE | Noted: 2023-05-21

## 2023-05-21 PROBLEM — G93.89 BRAIN MASS: Status: ACTIVE | Noted: 2023-01-01

## 2023-05-21 PROBLEM — F10.10 ALCOHOL ABUSE, CONTINUOUS: Chronic | Status: ACTIVE | Noted: 2023-01-01

## 2023-05-21 LAB
ABO GROUP BLD: NORMAL
ABO GROUP BLD: NORMAL
ALBUMIN SERPL BCP-MCNC: 4.3 G/DL (ref 3.5–5)
ALP SERPL-CCNC: 66 U/L (ref 34–104)
ALT SERPL W P-5'-P-CCNC: 9 U/L (ref 7–52)
ANION GAP SERPL CALCULATED.3IONS-SCNC: 16 MMOL/L (ref 4–13)
APAP SERPL-MCNC: <10 UG/ML (ref 10–20)
APTT PPP: 31 SECONDS (ref 23–37)
AST SERPL W P-5'-P-CCNC: 22 U/L (ref 13–39)
ATRIAL RATE: 71 BPM
BASOPHILS # BLD AUTO: 0.03 THOUSANDS/ÂΜL (ref 0–0.1)
BASOPHILS NFR BLD AUTO: 0 % (ref 0–1)
BILIRUB SERPL-MCNC: 0.54 MG/DL (ref 0.2–1)
BILIRUB UR QL STRIP: NEGATIVE
BLD GP AB SCN SERPL QL: NEGATIVE
BUN SERPL-MCNC: 25 MG/DL (ref 5–25)
CALCIUM SERPL-MCNC: 9.8 MG/DL (ref 8.4–10.2)
CHLORIDE SERPL-SCNC: 102 MMOL/L (ref 96–108)
CLARITY UR: CLEAR
CO2 SERPL-SCNC: 21 MMOL/L (ref 21–32)
COLOR UR: YELLOW
CREAT SERPL-MCNC: 0.56 MG/DL (ref 0.6–1.3)
EOSINOPHIL # BLD AUTO: 0.05 THOUSAND/ÂΜL (ref 0–0.61)
EOSINOPHIL NFR BLD AUTO: 1 % (ref 0–6)
ERYTHROCYTE [DISTWIDTH] IN BLOOD BY AUTOMATED COUNT: 12.7 % (ref 11.6–15.1)
ETHANOL SERPL-MCNC: <10 MG/DL
GFR SERPL CREATININE-BSD FRML MDRD: 107 ML/MIN/1.73SQ M
GLUCOSE SERPL-MCNC: 84 MG/DL (ref 65–140)
GLUCOSE SERPL-MCNC: 84 MG/DL (ref 65–140)
GLUCOSE UR STRIP-MCNC: NEGATIVE MG/DL
HCT VFR BLD AUTO: 44.9 % (ref 34.8–46.1)
HGB BLD-MCNC: 15.4 G/DL (ref 11.5–15.4)
HGB UR QL STRIP.AUTO: ABNORMAL
IMM GRANULOCYTES # BLD AUTO: 0.04 THOUSAND/UL (ref 0–0.2)
IMM GRANULOCYTES NFR BLD AUTO: 1 % (ref 0–2)
INR PPP: 1.16 (ref 0.84–1.19)
KETONES UR STRIP-MCNC: ABNORMAL MG/DL
LACTATE SERPL-SCNC: 0.7 MMOL/L (ref 0.5–2)
LEUKOCYTE ESTERASE UR QL STRIP: NEGATIVE
LYMPHOCYTES # BLD AUTO: 0.63 THOUSANDS/ÂΜL (ref 0.6–4.47)
LYMPHOCYTES NFR BLD AUTO: 8 % (ref 14–44)
MCH RBC QN AUTO: 32 PG (ref 26.8–34.3)
MCHC RBC AUTO-ENTMCNC: 34.3 G/DL (ref 31.4–37.4)
MCV RBC AUTO: 93 FL (ref 82–98)
MONOCYTES # BLD AUTO: 1 THOUSAND/ÂΜL (ref 0.17–1.22)
MONOCYTES NFR BLD AUTO: 12 % (ref 4–12)
NEUTROPHILS # BLD AUTO: 6.33 THOUSANDS/ÂΜL (ref 1.85–7.62)
NEUTS SEG NFR BLD AUTO: 78 % (ref 43–75)
NITRITE UR QL STRIP: POSITIVE
NRBC BLD AUTO-RTO: 0 /100 WBCS
P AXIS: 59 DEGREES
PH UR STRIP.AUTO: 5.5 [PH] (ref 4.5–8)
PLATELET # BLD AUTO: 232 THOUSANDS/UL (ref 149–390)
PMV BLD AUTO: 12.4 FL (ref 8.9–12.7)
POTASSIUM SERPL-SCNC: 3.5 MMOL/L (ref 3.5–5.3)
PR INTERVAL: 96 MS
PROT SERPL-MCNC: 7.3 G/DL (ref 6.4–8.4)
PROT UR STRIP-MCNC: ABNORMAL MG/DL
PROTHROMBIN TIME: 14.8 SECONDS (ref 11.6–14.5)
QRS AXIS: 86 DEGREES
QRSD INTERVAL: 72 MS
QT INTERVAL: 400 MS
QTC INTERVAL: 434 MS
RBC # BLD AUTO: 4.82 MILLION/UL (ref 3.81–5.12)
RH BLD: POSITIVE
RH BLD: POSITIVE
SALICYLATES SERPL-MCNC: <5 MG/DL (ref 3–20)
SODIUM SERPL-SCNC: 139 MMOL/L (ref 135–147)
SP GR UR STRIP.AUTO: >=1.03 (ref 1–1.03)
SPECIMEN EXPIRATION DATE: NORMAL
T WAVE AXIS: 71 DEGREES
TSH SERPL DL<=0.05 MIU/L-ACNC: 0.72 UIU/ML (ref 0.45–4.5)
UROBILINOGEN UR QL STRIP.AUTO: 1 E.U./DL
VENTRICULAR RATE: 71 BPM
WBC # BLD AUTO: 8.28 THOUSAND/UL (ref 4.31–10.16)

## 2023-05-21 RX ORDER — LABETALOL HYDROCHLORIDE 5 MG/ML
20 INJECTION, SOLUTION INTRAVENOUS ONCE
Status: DISCONTINUED | OUTPATIENT
Start: 2023-05-21 | End: 2023-05-21 | Stop reason: HOSPADM

## 2023-05-21 RX ORDER — SODIUM CHLORIDE, SODIUM GLUCONATE, SODIUM ACETATE, POTASSIUM CHLORIDE, MAGNESIUM CHLORIDE, SODIUM PHOSPHATE, DIBASIC, AND POTASSIUM PHOSPHATE .53; .5; .37; .037; .03; .012; .00082 G/100ML; G/100ML; G/100ML; G/100ML; G/100ML; G/100ML; G/100ML
1000 INJECTION, SOLUTION INTRAVENOUS ONCE
Status: COMPLETED | OUTPATIENT
Start: 2023-05-21 | End: 2023-05-21

## 2023-05-21 RX ORDER — DEXAMETHASONE SODIUM PHOSPHATE 10 MG/ML
10 INJECTION, SOLUTION INTRAMUSCULAR; INTRAVENOUS ONCE
Status: COMPLETED | OUTPATIENT
Start: 2023-05-21 | End: 2023-05-21

## 2023-05-21 RX ORDER — ALBUTEROL SULFATE 2.5 MG/3ML
5 SOLUTION RESPIRATORY (INHALATION) ONCE
Status: COMPLETED | OUTPATIENT
Start: 2023-05-21 | End: 2023-05-21

## 2023-05-21 RX ADMIN — ALBUTEROL SULFATE 5 MG: 2.5 SOLUTION RESPIRATORY (INHALATION) at 17:32

## 2023-05-21 RX ADMIN — IPRATROPIUM BROMIDE 0.5 MG: 0.5 SOLUTION RESPIRATORY (INHALATION) at 17:32

## 2023-05-21 RX ADMIN — DEXAMETHASONE SODIUM PHOSPHATE 10 MG: 10 INJECTION INTRAMUSCULAR; INTRAVENOUS at 18:58

## 2023-05-21 RX ADMIN — LEVETIRACETAM 1000 MG: 100 INJECTION, SOLUTION INTRAVENOUS at 18:58

## 2023-05-21 RX ADMIN — SODIUM CHLORIDE, SODIUM GLUCONATE, SODIUM ACETATE, POTASSIUM CHLORIDE, MAGNESIUM CHLORIDE, SODIUM PHOSPHATE, DIBASIC, AND POTASSIUM PHOSPHATE 1000 ML: .53; .5; .37; .037; .03; .012; .00082 INJECTION, SOLUTION INTRAVENOUS at 17:02

## 2023-05-21 NOTE — ED PROVIDER NOTES
History  Chief Complaint   Patient presents with   • CVA/TIA-like Symptoms     Patient brought by EMS from home  Per EMS family member of patient called to report weakness for past month, worse today with onset of confusion  Patient is confused during triage  Oriented to self and place only  No complaints  48 yo F brought by EMS from her residence where she lives with her , activated by her son, although on assessment in the ED, it is not clear what the instigating concern was today  [facility] called as a potential stroke, but there was no report of a last known normal, and the paramedics based their concern on thinking she might have a facial droop  Otherwise, the only other detail was weakness for 1 month  The patient is alert, but not oriented to place, having been told multiple times, she was not able to recall who called EMS or why  She has no complaints, denies pain, fever, chills, or current symptoms  Her  and sister did arrive shortly after, but on arrival they told me they were not present at the time, but instead it was another sister who went to check on her  Now, these two family members, says she has been increasingly confused over several weeks, has been c/o pain of her right hip so she doesn't get up  Her  leaves for 8 hours to go to work and finds her having not been up, not made food  He affirms she drinks daily, although her intake has been greatly decreased for several days  She smokes but has been cutting back  He affirms she is treated for COPD, but is unaware of other medical conditions  I have independently reviewed external records that are available to me to the level of detail possible within the time constraints of my patient care responsibilities in the ED  Last PCP visit was 4/24/23, no mention of confusion, lists HCV, GERD, and chronic pain  PDMP was reviewed, there are no controlled substances under her profile          History provided by: Spouse  History limited by:  Mental status change      Prior to Admission Medications   Prescriptions Last Dose Informant Patient Reported? Taking? Azelastine HCl 137 MCG/SPRAY SOLN   No No   Si SPRAYS INTO EACH NOSTRIL DAILY USE IN EACH NOSTRIL AS DIRECTED   Ibuprofen (ADVIL PO)   Yes No   Sig: Take by mouth Pt takes three Advil daily   PARoxetine (PAXIL) 10 mg tablet   No No   Sig: TAKE 1 TABLET BY MOUTH EVERY DAY   Patient not taking: Reported on 2022   albuterol (2 5 mg/3 mL) 0 083 % nebulizer solution   No No   Sig: Take 1 vial (2 5 mg total) by nebulization 3 (three) times a day   albuterol (PROVENTIL HFA,VENTOLIN HFA) 90 mcg/act inhaler   No No   Sig: INHALE 2 PUFFS EVERY 6 HOURS AS NEEDED FOR WHEEZING   famotidine (PEPCID) 40 MG tablet   No No   Sig: TAKE 1 TABLET BY MOUTH EVERY DAY   fluticasone (FLONASE) 50 mcg/act nasal spray Not Taking  No No   Si sprays into each nostril daily   Patient not taking: Reported on 2023   fluticasone-umeclidinium-vilanterol (Trelegy Ellipta) 100-62 5-25 mcg/actuation inhaler   No No   Sig: Inhale 1 puff daily Rinse mouth after use  folic acid (FOLVITE) 1 mg tablet   No No   Sig: TAKE 1 TABLET BY MOUTH EVERY DAY   gabapentin (NEURONTIN) 100 mg capsule   No No   Sig: TAKE 1 CAPSULE BY MOUTH EVERYDAY AT BEDTIME   meloxicam (Mobic) 15 mg tablet   No No   Sig: Take 1 tablet (15 mg total) by mouth daily   methylPREDNISolone 4 MG tablet therapy pack   No No   Sig: Use as directed on package   Patient not taking: Reported on 3/29/2023   metoprolol succinate (TOPROL-XL) 25 mg 24 hr tablet   No No   Sig: TAKE 1 TABLET (25 MG TOTAL) BY MOUTH DAILY     naproxen (NAPROSYN) 500 mg tablet Not Taking  No No   Sig: TAKE 1 TABLET BY MOUTH DAILY AS NEEDED FOR MILD PAIN   Patient not taking: Reported on 2023   omeprazole (PriLOSEC) 40 MG capsule   No No   Sig: TAKE 1 CAPSULE BY MOUTH EVERY DAY   thiamine (CVS B-1) 100 MG tablet   No No   Sig: Take 1 tablet (100 mg total) by mouth daily   Patient not taking: Reported on 2022      Facility-Administered Medications: None       Past Medical History:   Diagnosis Date   • Anxiety    • Depression    • Hepatitis C        Past Surgical History:   Procedure Laterality Date   • BUNIONECTOMY Left        Family History   Problem Relation Age of Onset   • Diabetes Mother    • COPD Mother    • Lung cancer Mother    • Lung cancer Father      I have reviewed and agree with the history as documented  E-Cigarette/Vaping   • E-Cigarette Use Never User      E-Cigarette/Vaping Substances   • Nicotine No    • THC No    • CBD No    • Flavoring No    • Other No    • Unknown No      Social History     Tobacco Use   • Smoking status: Former     Packs/day: 1 50     Years: 15 00     Pack years: 22 50     Types: Cigarettes     Start date: 5     Quit date: 2018     Years since quittin 4   • Smokeless tobacco: Never   • Tobacco comments:     Stop smoking a month ago /Dec  2018   Vaping Use   • Vaping Use: Never used   Substance Use Topics   • Alcohol use: Yes     Alcohol/week: 6 0 standard drinks     Types: 6 Cans of beer per week     Comment: occasional   • Drug use: Not Currently       Review of Systems    Physical Exam  Physical Exam  Vitals and nursing note reviewed  Constitutional:       General: She is not in acute distress  Appearance: She is well-developed and underweight  She is not ill-appearing or diaphoretic  Comments: Appears older than recorded age   HENT:      Head: Normocephalic and atraumatic  Right Ear: External ear normal       Left Ear: External ear normal       Nose: Nose normal    Eyes:      Conjunctiva/sclera: Conjunctivae normal       Pupils: Pupils are equal, round, and reactive to light  Cardiovascular:      Rate and Rhythm: Normal rate and regular rhythm  Pulmonary:      Effort: Pulmonary effort is normal    Abdominal:      Palpations: Abdomen is soft     Musculoskeletal:         General: Normal range of motion  Cervical back: Normal range of motion and neck supple  Skin:     General: Skin is warm and dry  Capillary Refill: Capillary refill takes less than 2 seconds  Findings: No rash  Neurological:      Mental Status: She is alert  She is disoriented and confused  Cranial Nerves: No cranial nerve deficit  Motor: No pronator drift  Coordination: Coordination normal       Gait: Gait normal    Psychiatric:         Mood and Affect: Affect is flat  Speech: Speech is delayed  Behavior: Behavior is slowed and withdrawn  Cognition and Memory: Cognition is impaired  She exhibits impaired recent memory and impaired remote memory           Vital Signs  ED Triage Vitals   Temperature Pulse Respirations Blood Pressure SpO2   05/21/23 1645 05/21/23 1645 05/21/23 1645 05/21/23 1645 05/21/23 1645   98 2 °F (36 8 °C) 68 18 170/89 99 %      Temp Source Heart Rate Source Patient Position - Orthostatic VS BP Location FiO2 (%)   05/21/23 1645 05/21/23 1645 05/21/23 1647 05/21/23 1647 --   Oral Monitor Lying Right arm       Pain Score       --                  Vitals:    05/21/23 1945 05/21/23 2000 05/21/23 2015 05/21/23 2030   BP: (!) 211/96 169/87 165/90 158/80   Pulse: 70 66 86 72   Patient Position - Orthostatic VS: Lying Lying Lying Lying         Visual Acuity  Visual Acuity    Flowsheet Row Most Recent Value   L Pupil Size (mm) 3   R Pupil Size (mm) 3          ED Medications  Medications   multi-electrolyte (ISOLYTE-S PH 7 4) bolus 1,000 mL (0 mL Intravenous Stopped 5/21/23 1802)   albuterol inhalation solution 5 mg (5 mg Nebulization Given 5/21/23 1732)   ipratropium (ATROVENT) 0 02 % inhalation solution 0 5 mg (0 5 mg Nebulization Given 5/21/23 1732)   dexamethasone (PF) (DECADRON) injection 10 mg (10 mg Intravenous Given 5/21/23 1858)   levETIRAcetam (KEPPRA) 1,000 mg in sodium chloride 0 9 % 100 mL IVPB (0 mg Intravenous Stopped 5/21/23 1913) Diagnostic Studies  Results Reviewed     Procedure Component Value Units Date/Time    Urine Macroscopic, POC [[id number]]  (Abnormal) Collected: 05/21/23 1945    Lab Status: Final result Specimen: Urine Updated: 05/21/23 1946     Color, UA Yellow     Clarity, UA Clear     pH, UA 5 5     Leukocytes, UA Negative     Nitrite, UA Positive     Protein, UA 30 (1+) mg/dl      Glucose, UA Negative mg/dl      Ketones, UA 40 (2+) mg/dl      Urobilinogen, UA 1 0 E U /dl      Bilirubin, UA Negative     Occult Blood, UA Trace     Specific Gravity, UA >=1 030    Narrative:      CLINITEK RESULT    Urine Microscopic [[id number]] Collected: 05/21/23 1945    Lab Status: No result Specimen: Urine     TSH [[id number]]  (Normal) Collected: 05/21/23 1705    Lab Status: Final result Specimen: Blood from Arm, Left Updated: 05/21/23 1746     TSH 3RD GENERATON 0 725 uIU/mL     Acetaminophen level-If concentration is detectable, please discuss with medical  on call   [[id number]]  (Abnormal) Collected: 05/21/23 1705    Lab Status: Final result Specimen: Blood from Arm, Left Updated: 05/21/23 1739     Acetaminophen Level <10 ug/mL     Comprehensive metabolic panel [[id number]]  (Abnormal) Collected: 05/21/23 1705    Lab Status: Final result Specimen: Blood from Arm, Left Updated: 05/21/23 1739     Sodium 139 mmol/L      Potassium 3 5 mmol/L      Chloride 102 mmol/L      CO2 21 mmol/L      ANION GAP 16 mmol/L      BUN 25 mg/dL      Creatinine 0 56 mg/dL      Glucose 84 mg/dL      Calcium 9 8 mg/dL      AST 22 U/L      ALT 9 U/L      Alkaline Phosphatase 66 U/L      Total Protein 7 3 g/dL      Albumin 4 3 g/dL      Total Bilirubin 0 54 mg/dL      eGFR 107 ml/min/1 73sq m     Narrative:      [facility] guidelines for Chronic Kidney Disease (CKD):   •  Stage 1 with normal or high GFR (GFR > 90 mL/min/1 73 square meters)  •  Stage 2 Mild CKD (GFR = 60-89 mL/min/1 73 square meters)  •  Stage 3A Moderate CKD (GFR = 45-59 mL/min/1 73 square meters)  •  Stage 3B Moderate CKD (GFR = 30-44 mL/min/1 73 square meters)  •  Stage 4 Severe CKD (GFR = 15-29 mL/min/1 73 square meters)  •  Stage 5 End Stage CKD (GFR <15 mL/min/1 73 square meters)  Note: GFR calculation is accurate only with a steady state creatinine    Salicylate level [[id number]]  (Normal) Collected: 05/21/23 1705    Lab Status: Final result Specimen: Blood from Arm, Left Updated: 76/13/54 4434     Salicylate Lvl <5 mg/dL     Protime-INR [[id number]]  (Abnormal) Collected: 05/21/23 1705    Lab Status: Final result Specimen: Blood from Arm, Left Updated: 05/21/23 1739     Protime 14 8 seconds      INR 1 16    APTT [[id number]]  (Normal) Collected: 05/21/23 1705    Lab Status: Final result Specimen: Blood from Arm, Left Updated: 05/21/23 1739     PTT 31 seconds     Blood culture #1 [[id number]] Collected: 05/21/23 1735    Lab Status: In process Specimen: Blood from Arm, Right Updated: 05/21/23 1739    Blood culture #2 [[id number]] Collected: 05/21/23 1735    Lab Status: In process Specimen: Blood from Hand, Left Updated: 05/21/23 1739    Lactic acid, plasma (w/reflex if result > 2 0) [[id number]]  (Normal) Collected: 05/21/23 1705    Lab Status: Final result Specimen: Blood from Arm, Left Updated: 05/21/23 1729     LACTIC ACID 0 7 mmol/L     Narrative:      Result may be elevated if tourniquet was used during collection      Ethanol [[id number]]  (Normal) Collected: 05/21/23 1705    Lab Status: Final result Specimen: Blood from Arm, Left Updated: 05/21/23 1729     Ethanol Lvl <10 mg/dL     CBC and differential [[id number]]  (Abnormal) Collected: 05/21/23 1705    Lab Status: Final result Specimen: Blood from Arm, Left Updated: 05/21/23 1726     WBC 8 28 Thousand/uL      RBC 4 82 Million/uL      Hemoglobin 15 4 g/dL      Hematocrit 44 9 %      MCV 93 fL      MCH 32 0 pg      MCHC 34 3 g/dL      RDW 12 7 %      MPV 12 4 fL      Platelets 944 Thousands/uL      nRBC 0 /100 WBCs Neutrophils Relative 78 %      Immat GRANS % 1 %      Lymphocytes Relative 8 %      Monocytes Relative 12 %      Eosinophils Relative 1 %      Basophils Relative 0 %      Neutrophils Absolute 6 33 Thousands/µL      Immature Grans Absolute 0 04 Thousand/uL      Lymphocytes Absolute 0 63 Thousands/µL      Monocytes Absolute 1 00 Thousand/µL      Eosinophils Absolute 0 05 Thousand/µL      Basophils Absolute 0 03 Thousands/µL     Narrative: This is an appended report  These results have been appended to a previously verified report  Fingerstick Glucose (POCT) [[id number]]  (Normal) Collected: 05/21/23 1649    Lab Status: Final result Updated: 05/21/23 1651     POC Glucose 84 mg/dl                  CT head without contrast   ED Interpretation by Payton Payton MD (05/21 1806)   Frontal mass with mass effect and shift      Final Result by Lucero Whitten MD (05/21 1835)      Multiple (at least 4) intracranial mass lesions as above including dominant left frontal/basal ganglia lesions with significant associated vasogenic edema in the left greater than right hemispheres highly concerning for metastases  Significant associated    mass effect including 1 cm rightward subfalcine herniation  Nonspecific marginal hyperattenuation suspicious for small amount of perilesional hemorrhage  Associated ventricular entrapment/obstructive hydrocephalus  Urgent neurosurgery consult and follow-up gadolinium-enhanced MRI of the brain advised  Above findings discussed with Dr Lorenza De Leon at 6:30 p m  on 5/21/2023  Workstation performed: [id number]         XR chest 1 view portable    (Results Pending)   XR hip/pelv 2-3 vws right    (Results Pending)              Procedures  Procedures         ED Course  ED Course as of 05/21/23 2158   Sun May 21, 2023   1725 I was notified that she entered a paroxysm of cough, and HR elevated to 165  I went back to assess, and she had improved    She affirmed cough is "a frequent occurrence for her  BS are decreased bilaterally, but no new findings from initial assessment  Treat with nebulizer treatment   5593 Her 11 yo son who witnessed the concern today arrived, and I got additional history  He gets up late and came out about 13:45 and she was on the couch, seemed awake but was not responding, she would only answer with grunts nonverbal gestures, and \"she seemed out of it\"  She did get up and go outside, but just seemed to be wandering aimlessly, and asked what she was doing only responded \"I don't know  \"  Today was the most prominent of these behaviors, but he affirms she has had confused spells for a few weeks, and her older son who stops by about twice per week, has stopped by and found her sleeping, but he said he thought she was intoxicated because she often drinks and stays up all night  1806 CT head without contrast  My independent interpretation of imaging:  mass with edema and shift, will await final radiology read, but I am going to start consultation with neurosurgery so I can start treatment in anticipation of transfer   Nicole Zamarripa 1 CT head without contrast  Radiologist called and affirmed multiple masses, with edema and mass effect, hydrocephalus, suspicious for metastatic disease   1837 D/W Dr Tracei Rodriguez, for neurocritical care, ordered dexamethasone 10 mg, keppra 1000 mg, and transfer to [address] [place] [address] Reviewed results with patient and her sisters at bedside, sons and  left, and updated on the devastating results of CT, need for transfer and medications that will be started   1853 She says she has urge to void, but is not voiding, so I think a sanchez is indicated  1945 BP has been increasing, now it is more elevated, so I am treating   2020 BP improved without treatment                                             Medical Decision Making  Amount and/or Complexity of Data Reviewed  Labs: ordered    Radiology: ordered and independent interpretation " performed  Decision-making details documented in ED Course  Details: R hip interpreted by me, no fracture, no lytic lesions, normal alignment      Risk  Prescription drug management  Disposition  Final diagnoses:   Intracranial mass   Vasogenic brain edema ([facility] [address] )   Encephalopathy     Time reflects when diagnosis was documented in both MDM as applicable and the Disposition within this note     Time User Action Codes Description Comment    5/21/2023  6:43 PM Flakito Benton L Add [R90 0] Intracranial mass     5/21/2023  6:43 PM Flakito Benton L Add [G93 6] Vasogenic brain edema ([facility] [address] )     5/21/2023  6:43 PM Claudia Cejageo Add [G93 40] Encephalopathy       ED Disposition     ED Disposition   Transfer to Another Facility-In Network    Condition   --    Date/Time   Sun May 21, 2023  6:43 PM    Comment   Catina Flaherty should be transferred out to [facility]             MD Documentation    John Lee Most Recent Value   Patient Condition The patient has been stabilized such that within reasonable medical probability, no material deterioration of the patient condition or the condition of the unborn child(racheal) is likely to result from the transfer   Reason for Transfer Level of Care needed not available at this facility   Benefits of Transfer Specialized equipment and/or services available at the receiving facility (Include comment)________________________   Risks of Transfer Potential for delay in receiving treatment, Potential deterioration of medical condition, Loss of IV, Increased discomfort during transfer, Possible worsening of condition or death during transfer   Accepting Physician Jerad Bose [facility]   Sending MD Dr Cecy Borrego      RN Documentation    [address], Jerad Bowen [facility]      Follow-up Information    None         Discharge Medication List as of 5/21/2023  8:53 PM      CONTINUE these medications which have NOT CHANGED    Details   albuterol (2 5 mg/3 mL) 0 083 % nebulizer solution Take 1 vial (2 5 mg total) by nebulization 3 (three) times a day, Starting Fri 3/13/2020, Normal      albuterol (PROVENTIL HFA,VENTOLIN HFA) 90 mcg/act inhaler INHALE 2 PUFFS EVERY 6 HOURS AS NEEDED FOR WHEEZING, Normal      Azelastine HCl 137 MCG/SPRAY SOLN 2 SPRAYS INTO EACH NOSTRIL DAILY USE IN EACH NOSTRIL AS DIRECTED, Normal      famotidine (PEPCID) 40 MG tablet TAKE 1 TABLET BY MOUTH EVERY DAY, Normal      fluticasone (FLONASE) 50 mcg/act nasal spray 2 sprays into each nostril daily, Starting Fri 9/2/2022, Normal      fluticasone-umeclidinium-vilanterol (Trelegy Ellipta) 100-62 5-25 mcg/actuation inhaler Inhale 1 puff daily Rinse mouth after use , Starting Wed 3/29/2023, Until Fri 2/00/6236, Normal      folic acid (FOLVITE) 1 mg tablet TAKE 1 TABLET BY MOUTH EVERY DAY, Normal      gabapentin (NEURONTIN) 100 mg capsule TAKE 1 CAPSULE BY MOUTH EVERYDAY AT BEDTIME, Normal      Ibuprofen (ADVIL PO) Take by mouth Pt takes three Advil daily, Historical Med      meloxicam (Mobic) 15 mg tablet Take 1 tablet (15 mg total) by mouth daily, Starting Thu 12/1/2022, Normal      methylPREDNISolone 4 MG tablet therapy pack Use as directed on package, Normal      metoprolol succinate (TOPROL-XL) 25 mg 24 hr tablet TAKE 1 TABLET (25 MG TOTAL) BY MOUTH DAILY  , Starting Fri 2/10/2023, Normal      naproxen (NAPROSYN) 500 mg tablet TAKE 1 TABLET BY MOUTH DAILY AS NEEDED FOR MILD PAIN, Normal      omeprazole (PriLOSEC) 40 MG capsule TAKE 1 CAPSULE BY MOUTH EVERY DAY, Normal      PARoxetine (PAXIL) 10 mg tablet TAKE 1 TABLET BY MOUTH EVERY DAY, Normal      thiamine (CVS B-1) 100 MG tablet Take 1 tablet (100 mg total) by mouth daily, Starting Thu 12/10/2020, Normal             No discharge procedures on file      PDMP Review       Value Time User    PDMP Reviewed  Yes 5/21/2023 5:10 PM Gokul Castrejon MD          ED Provider  Electronically Signed by           Gokul Castrejon MD  05/21/23 2150       Gokul Castrejon MD  05/21/23 4890

## 2023-05-21 NOTE — EMTALA/ACUTE CARE TRANSFER
[facility] [address]  [address] [facility] [id number]  Dept: [phone]      EMTALA TRANSFER CONSENT    NAME Sebastian Aggarwal 1970                              MRN [id number]    I have been informed of my rights regarding examination, treatment, and transfer   by Dr Heather Ayala MD    Benefits: Specialized equipment and/or services available at the receiving facility (Include comment)________________________    Risks: Potential for delay in receiving treatment, Potential deterioration of medical condition, Loss of IV, Increased discomfort during transfer, Possible worsening of condition or death during transfer      Consent for Transfer:  I acknowledge that my medical condition has been evaluated and explained to me by the emergency department physician or other qualified medical person and/or my attending physician, who has recommended that I be transferred to the service of  Accepting Physician: Dr Tracie Rodriguez at [address] [place] [address] Name, [address] : [place] [address] [address] [place]  The above potential benefits of such transfer, the potential risks associated with such transfer, and the probable risks of not being transferred have been explained to me, and I fully understand them  The doctor has explained that, in my case, the benefits of transfer outweigh the risks  I agree to be transferred  I authorize the performance of emergency medical procedures and treatments upon me in both transit and upon arrival at the receiving facility  Additionally, I authorize the release of any and all medical records to the receiving facility and request they be transported with me, if possible  I understand that the safest mode of transportation during a medical emergency is an ambulance and that the Hospital advocates the use of this mode of transport   Risks of traveling to the receiving facility by car, including absence of medical control, life sustaining equipment, such as oxygen, and medical personnel has been explained to me and I fully understand them  (CHRISTIAN CORRECT BOX BELOW)  [  ]  I consent to the stated transfer and to be transported by ambulance/helicopter  [  ]  I consent to the stated transfer, but refuse transportation by ambulance and accept full responsibility for my transportation by car  I understand the risks of non-ambulance transfers and I exonerate the Hospital and its staff from any deterioration in my condition that results from this refusal     X___________________________________________    DATE  23  TIME________  Signature of patient or legally responsible individual signing on patient behalf           RELATIONSHIP TO PATIENT_________________________          Provider Certification    NAME Victoria Anderson                                        [facility] 1970                              MRN [id number]    A medical screening exam was performed on the above named patient  Based on the examination:    Condition Necessitating Transfer The primary encounter diagnosis was Intracranial mass  Diagnoses of Vasogenic brain edema (HCC) and Encephalopathy were also pertinent to this visit      Patient Condition: The patient has been stabilized such that within reasonable medical probability, no material deterioration of the patient condition or the condition of the unborn child(racheal) is likely to result from the transfer    Reason for Transfer: Level of Care needed not available at this facility    Transfer Requirements: [address] [facility], [address] [place] [address]   · Space available and qualified personnel available for treatment as acknowledged by    · Agreed to accept transfer and to provide appropriate medical treatment as acknowledged by       Dr Jsoelo Holder  · Appropriate medical records of the examination and treatment of the patient are provided at the time of transfer   [facility] COMPLETED _______  · Transfer will be performed by qualified personnel from    and appropriate transfer equipment as required, including the use of necessary and appropriate life support measures  Provider Certification: I have examined the patient and explained the following risks and benefits of being transferred/refusing transfer to the patient/family:         Based on these reasonable risks and benefits to the patient and/or the unborn child(racheal), and based upon the information available at the time of the patient’s examination, I certify that the medical benefits reasonably to be expected from the provision of appropriate medical treatments at another medical facility outweigh the increasing risks, if any, to the individual’s medical condition, and in the case of labor to the unborn child, from effecting the transfer      X____________________________________________ DATE 05/21/23        TIME_______      ORIGINAL - SEND TO MEDICAL RECORDS   COPY - SEND WITH PATIENT DURING TRANSFER

## 2023-05-22 ENCOUNTER — TELEPHONE (OUTPATIENT)
Dept: FAMILY MEDICINE CLINIC | Facility: CLINIC | Age: 53
End: 2023-05-22

## 2023-05-22 NOTE — SPEECH THERAPY NOTE
Speech-Language Pathology Bedside Swallow Evaluation    Patient Name: Dalton Gu    GEKTL'L Date: 5/22/2023     Problem List  Principal Problem:    Brain mass  Active Problems:    Gastroesophageal reflux disease with hiatal hernia    Hepatitis C antibody test positive    Depression    COPD (chronic obstructive pulmonary disease) (HCC)    Encephalopathy    Alcohol abuse, continuous    Past Medical History  Past Medical History:   Diagnosis Date   • Anxiety    • Depression    • Hepatitis C      Past Surgical History  Past Surgical History:   Procedure Laterality Date   • BUNIONECTOMY Left        Summary  Assessment limited due to AMS  Pt was able to manipulate ice chips, however no consistent transfer and swallow noted  She had a significant coughing episode with trial of ice, therefore additional trials were withheld today  ST will f/u for continued assessment as able  Risk/s for Aspiration: mod    Recommended Diet: NPO now  Recommended Form of Meds: non-oral if possible   Aspiration precautions and swallowing strategies: upright posture  Other Recommendations: Continue frequent oral care, ST to re assess prior to initiating PO diet      Current Medical Status per H&P 5/22/23  Dalton Gu is a 47 y o  who presents with acute onset confusion with 1 month history of generalized weakness  PMH includes depression, alcohol abuse, COPD, tobacco abuse, GERD, and Hep C  Sister went to check on patient at home and called 911 due to confusion  Per family, patient lives with boyfriend and 16year old son  Boyfriend leaves for work for 8 hour shifts and over the past month she would be in the same place as when he left  He leaves her 6 beers and 5 cigarettes  Over the past week, she has only had 1 beer and 1 cigarette daily  Patient also not making dinner, as she usually does  In addiction, she has had persistent R hip pain that her family doctor has ordered an Xray for as he believes it is sciatica   Xray completed and pending       Special Studies:  CT chest 5/21/23 IMPRESSION:  3 x 2 8 x 2 6 cm mass superior segment of the left lower lobe highly suspicious for primary lung cancer  Additional scattered bilateral subcentimeter nodules are indeterminate  Upper lobe predominant emphysema  No metastatic disease in the abdomen or pelvis  Swallow Information   Current Risks for Dysphagia & Aspiration: AMS  Current Diet: NPO   Baseline Diet: regular diet and thin liquids      Baseline Assessment   Behavior/Cognition: lethargic and decreased attention  Speech/Language Status: not able to to follow commands and limited verbal output  Patient Positioning: upright in bed  Pain Status/Interventions/Response to Interventions: No report of or nonverbal indications of pain  Swallow Mechanism Exam  Facial: masked facies  Labial: unable to test 2/2 limited command following  Lingual: unable to test 2/2 limited command following  Velum: unable to visualize  Mandible:  decreased ROM  Dentition: edentulous  Vocal quality:weak   Volitional Cough: unable to initiate volitional cough   Respiratory Status: on RA       Consistencies Assessed and Performance   Consistencies Administered: ice chips    Oral Stage: min trials, fair manipulation however suspect reduced oral control    Pharyngeal Stage: limited assessment d/t poor alertness and min trials of ice  Significant coughing with small ice chips    Esophageal Concerns: hx GERD    Summary and Recommendations (see above)    Results Reviewed with: patient, RN and family     Treatment Recommended: yes     Frequency of treatment: 2-3x/week    Dysphagia LTG  -Patient will demonstrate safe and effective oral intake (without overt s/s significant oral/pharyngeal dysphagia including s/s penetration or aspiration) for the highest appropriate diet level       Short Term Goals:  -Pt will tolerate the least restrictive diet and least restrictive liquid consistency with no significant s/s oral or pharyngeal dysphagia across 1-3 diagnostic sessions     -Patient will tolerate trials of upgraded food and/or liquid texture with no significant s/s of oral or pharyngeal dysphagia including aspiration across 1-3 diagnostic sessions     -If indicated, patient will comply with a Video/Modified Barium Swallow study for more complete assessment of swallowing anatomy/physiology/aspiration risk and to assess efficacy of treatment techniques so as to best guide treatment plan

## 2023-05-22 NOTE — RESPIRATORY THERAPY NOTE
RT Ventilator Management Note  Catrachito Baeza 47 y o  female MRN: [id number]  Unit/Bed#: ICU 09 Encounter: [id number]      Daily Screen         5/22/2023  2220             Patient safety screen outcome[de-identified] Failed (P)     Not Ready for Weaning due to[de-identified] --              Physical Exam:   Assessment Type: (P) Assess only  General Appearance: (P) Sedated  Respiratory Pattern: (P) Assisted  Chest Assessment: (P) Chest expansion symmetrical  Bilateral Breath Sounds: (P) Diminished, Clear      Resp Comments: (P) call to ICU 9 for intubation assist  pt intubated with a size 7 ETT secured a 23 at the lip  positive calirometric end tidal CO2 and bilateral BS present  pt is pleaced on the vent on S-CMV settings   alarms are set and functioning

## 2023-05-22 NOTE — PROCEDURES
Central Line Insertion    Date/Time: 5/22/2023 6:53 PM  Performed by: Sania Dela Cruz MD  Authorized by: Sania Dela Cruz MD     Patient location:  ICU  Consent:     Consent obtained:  Emergent situation  Universal protocol:     Patient identity confirmed:  Arm band  Pre-procedure details:     Hand hygiene: Hand hygiene performed prior to insertion      Sterile barrier technique: All elements of maximal sterile technique followed      Skin preparation:  2% chlorhexidine    Skin preparation agent: Skin preparation agent completely dried prior to procedure    Indications:     Central line indications: medications requiring central line    Procedure details:     Location:  Right internal jugular    Catheter type:  Triple lumen 16cm    Landmarks identified: yes      Manometry confirmation: yes      Number of attempts:  1    Successful placement: yes    Post-procedure details:     Post-procedure:  Dressing applied and line sutured    Assessment:  Blood return through all ports, free fluid flow, no pneumothorax on x-ray and placement verified by x-ray    Post-procedure complications: none      Patient tolerance of procedure:   Tolerated well, no immediate complications
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
## 2023-05-22 NOTE — ASSESSMENT & PLAN NOTE
· Patient presented with nondescript symptoms  · Family had concern regarding her mental state with altered sensorium and generalized weakness for 1 month    Imaging:   • CT head 5/21/2023: Multiple intracranial mass lesions including dominant left frontal/basal ganglia lesion with significant and associated vasogenic edema in the left greater than right hemispheres highly concerning for metastasis  Significant mass effect and rightward subfalcine herniation  Hyperattenuation suspicious for small amount of perilesional hemorrhage  Associated ventricular entrapment/obstructive hydrocephalus  Plan:   · ongoing frequent neurological checks  · Recommend STAT CT head for decline in GCS >2 points in 1 hour  · Recommend MRI brain with and without contrast  Brain tumor protocol  · Keppra 500mg BID for seizure ppx per trauma team   · DVT ppx: SCD's only  · Hold all AC/AP medication at this time  · Ongoing medical management and pain control per primary team  · CT CAP with lung mass on L- formal read pending   · CM following for dispo planning  · Patient has significant intracranial disease burden primarily in the L frontal lobe  · Neurosurgery will continue to follow with completion of MRI brain  Call with questions or concerns

## 2023-05-22 NOTE — ASSESSMENT & PLAN NOTE
· Drinks 6 beers daily   · Over the past week, patient with decreased alcohol intake (1 beer daily)  · CIWA ordered   · Seizure precautions

## 2023-05-22 NOTE — ASSESSMENT & PLAN NOTE
· Continue home regimen:   • Smoking cessation  • Trelegy Ellipta 100, 1 inhalation once a day  • Albuterol rescue inhaler 2 inhalations 4 times a day as needed  • Fluticasone nasal spray 2 sprays to each nostril once a day  • Astelin nasal spray 2 sprays to each nostril twice a day    • Follows with Dr Mirian Scott as outpatient   • Nicotine patch ordered

## 2023-05-22 NOTE — PROCEDURES
Arterial Line Insertion    Date/Time: 5/22/2023 7:52 PM  Performed by: RT Daisy  Authorized by: MARIA GUADALUPE Leonard     Patient location:  ICU  Consent:     Consent obtained:  Emergent situation  Universal protocol:     Patient identity confirmed:  Arm band  Indications:     Indications: hemodynamic monitoring, multiple ABGs, continuous blood pressure monitoring and frequent labs / infusion    Pre-procedure details:     Skin preparation:  Chlorhexidine  Anesthesia (see MAR for exact dosages):      Anesthesia method:  None  Procedure details:     Location / Tip of Catheter:  Radial    Laterality:  Right    Needle gauge:  20 G    Placement technique:  Percutaneous    Number of attempts:  1    Transducer: waveform confirmed    Post-procedure details:     Post-procedure:  Sterile dressing applied

## 2023-05-22 NOTE — ASSESSMENT & PLAN NOTE
· 1 month history of waxing/waning weakness  · Today patient with confusion and EMS called   · [address] revealed: Multiple (at least 4) intracranial mass lesions as above including dominant left frontal/basal ganglia lesions with significant associated vasogenic edema in the left greater than right hemispheres highly concerning for metastases  Significant associated mass effect including 1 cm rightward subfalcine herniation  Nonspecific marginal hyperattenuation suspicious for small amount of perilesional hemorrhage  Associated ventricular entrapment/obstructive hydrocephalus    · CT C/A/P to r/o malignancy pending   · MRI/MRA pending  · Neurosurgery consulted   · Continue decadron 4mg q6hr   · Continue Keppra 500mg BID   · Continue q2hr neuro exams   · NPO until neurosurgical eval

## 2023-05-22 NOTE — PLAN OF CARE
Problem: SAFETY,RESTRAINT: NV/NON-SELF DESTRUCTIVE BEHAVIOR  Goal: Remains free of harm/injury (restraint for non violent/non self-detsructive behavior)  Description: INTERVENTIONS:  - Instruct patient/family regarding restraint use   - Assess and monitor physiologic and psychological status   - Provide interventions and comfort measures to meet assessed patient needs   - Identify and implement measures to help patient regain control  - Assess readiness for release of restraint   Outcome: Progressing  Goal: Returns to optimal restraint-free functioning  Description: INTERVENTIONS:  - Assess the patient's behavior and symptoms that indicate continued need for restraint  - Identify and implement measures to help patient regain control  - Assess readiness for release of restraint   Outcome: Progressing     Problem: PAIN - ADULT  Goal: Verbalizes/displays adequate comfort level or baseline comfort level  Description: Interventions:  - Encourage patient to monitor pain and request assistance  - Assess pain using appropriate pain scale  - Administer analgesics based on type and severity of pain and evaluate response  - Implement non-pharmacological measures as appropriate and evaluate response  - Consider cultural and social influences on pain and pain management  - Notify physician/advanced practitioner if interventions unsuccessful or patient reports new pain  Outcome: Progressing     Problem: INFECTION - ADULT  Goal: Absence or prevention of progression during hospitalization  Description: INTERVENTIONS:  - Assess and monitor for signs and symptoms of infection  - Monitor lab/diagnostic results  - Monitor all insertion sites, i e  indwelling lines, tubes, and drains  - Monitor endotracheal if appropriate and nasal secretions for changes in amount and color  - Tavares appropriate cooling/warming therapies per order  - Administer medications as ordered  - Instruct and encourage patient and family to use good hand hygiene technique  - Identify and instruct in appropriate isolation precautions for identified infection/condition  Outcome: Progressing  Goal: Absence of fever/infection during neutropenic period  Description: INTERVENTIONS:  - Monitor WBC    Outcome: Progressing     Problem: SAFETY ADULT  Goal: Patient will remain free of falls  Description: INTERVENTIONS:  - Educate patient/family on patient safety including physical limitations  - Instruct patient to call for assistance with activity   - Consult OT/PT to assist with strengthening/mobility   - Keep Call bell within reach  - Keep bed low and locked with side rails adjusted as appropriate  - Keep care items and personal belongings within reach  - Initiate and maintain comfort rounds  - Make Fall Risk Sign visible to staff  - Offer Toileting every 2 Hours, in advance of need  - Initiate/Maintain bed alarm    - Apply yellow socks and bracelet for high fall risk patients  - Consider moving patient to room near nurses station  Outcome: Progressing  Goal: Maintain or return to baseline ADL function  Description: INTERVENTIONS:  -  Assess patient's ability to carry out ADLs; assess patient's baseline for ADL function and identify physical deficits which impact ability to perform ADLs (bathing, care of mouth/teeth, toileting, grooming, dressing, etc )  - Assess/evaluate cause of self-care deficits   - Assess range of motion  - Assess patient's mobility; develop plan if impaired  - Assess patient's need for assistive devices and provide as appropriate  - Encourage maximum independence but intervene and supervise when necessary  - Involve family in performance of ADLs  - Assess for home care needs following discharge   - Consider OT consult to assist with ADL evaluation and planning for discharge  - Provide patient education as appropriate  Outcome: Progressing  Goal: Maintains/Returns to pre admission functional level  Description: INTERVENTIONS:  - Perform BMAT or MOVE assessment daily    - Set and communicate daily mobility goal to care team and patient/family/caregiver  - Collaborate with rehabilitation services on mobility goals if consulted  - Perform Range of Motion 3 times a day  - Reposition patient every 2 hours  - Dangle patient 3 times a day    - Out of bed for toileting  - Record patient progress and toleration of activity level   Outcome: Progressing     Problem: DISCHARGE PLANNING  Goal: Discharge to home or other facility with appropriate resources  Description: INTERVENTIONS:  - Identify barriers to discharge w/patient and caregiver  - Arrange for needed discharge resources and transportation as appropriate  - Identify discharge learning needs (meds, wound care, etc )  - Arrange for interpretive services to assist at discharge as needed  - Refer to Case Management Department for coordinating discharge planning if the patient needs post-hospital services based on physician/advanced practitioner order or complex needs related to functional status, cognitive ability, or social support system  Outcome: Progressing     Problem: Knowledge Deficit  Goal: Patient/family/caregiver demonstrates understanding of disease process, treatment plan, medications, and discharge instructions  Description: Complete learning assessment and assess knowledge base    Interventions:  - Provide teaching at level of understanding  - Provide teaching via preferred learning methods  Outcome: Progressing

## 2023-05-22 NOTE — ASSESSMENT & PLAN NOTE
· Continue home regimen:   • Smoking cessation  • Trelegy Ellipta 100, 1 inhalation once a day  • Albuterol rescue inhaler 2 inhalations 4 times a day as needed  • Fluticasone nasal spray 2 sprays to each nostril once a day  • Astelin nasal spray 2 sprays to each nostril twice a day    • Follows with Dr Arley Fernandez as outpatient   • Nicotine patch ordered

## 2023-05-22 NOTE — CONSULTS
[facility]  Consult  Name: Vishal Alexandra 47 y o  female I MRN: [id number]  [facility] I Date of Admission: 5/21/2023   Date of Service: 5/22/2023 I Hospital Day: 1    Inpatient consult to Neurosurgery  Consult performed by: Candido Lopez PA-C  Consult ordered by: Areli Amos PA-C        Assessment/Plan   * Brain mass  Assessment & Plan  · Patient presented with nondescript symptoms  · Family had concern regarding her mental state with altered sensorium and generalized weakness for 1 month    Imaging:   • CT head 5/21/2023: Multiple intracranial mass lesions including dominant left frontal/basal ganglia lesion with significant and associated vasogenic edema in the left greater than right hemispheres highly concerning for metastasis  Significant mass effect and rightward subfalcine herniation  Hyperattenuation suspicious for small amount of perilesional hemorrhage  Associated ventricular entrapment/obstructive hydrocephalus  Plan:   · ongoing frequent neurological checks  · Recommend STAT CT head for decline in GCS >2 points in 1 hour  · Recommend MRI brain with and without contrast  Brain tumor protocol  · Keppra 500mg BID for seizure ppx per trauma team   · DVT ppx: SCD's only  · Hold all AC/AP medication at this time  · Ongoing medical management and pain control per primary team  · CT CAP with lung mass on L- formal read pending   · CM following for dispo planning  · Patient has significant intracranial disease burden primarily in the L frontal lobe  · Neurosurgery will continue to follow with completion of MRI brain  Call with questions or concerns  History of Present Illness     HPI: Vishal Alexandra is a 47 y o  female with PMH including hepatitis C, anxiety and depresssion who presented to the hospital via EMS  No family was available at time of evaluation for obtaining past history    Patient is unreliable historian with "significant confusion on examination  History is obtained primarily through chart review  Patient appears to have presented due to family's concern for altered mental status and generalized weakness for 1 month  Patient patient was found to have multiple intracranial brain masses concerning for metastatic disease  She was transferred to [facility] for further neurosurgical evaluation  On evaluation patient is an unreliable historian with confusion  She does not have any insight into her current medical state and condition at this time  Patient is difficult to get accurate information from to complete review of systems  Per chart patient was seen for yearly physical on  and was also evaluated by PM for chronic low back pain  At that time patient was reportedly not smoking, however on admission family reports she was still smoking but \"cutting back\"  Review of Systems   Constitutional: Positive for fatigue  Respiratory: Negative  Cardiovascular: Negative  Gastrointestinal: Negative  Genitourinary: Negative  Musculoskeletal: Positive for back pain  Neurological: Negative for weakness and headaches  Psychiatric/Behavioral: Negative          Historical Information   Past Medical History:   Diagnosis Date   • Anxiety    • Depression    • Hepatitis C      Past Surgical History:   Procedure Laterality Date   • BUNIONECTOMY Left      Social History     Substance and Sexual Activity   Alcohol Use Yes   • Alcohol/week: 6 0 standard drinks   • Types: 6 Cans of beer per week    Comment: occasional     Social History     Substance and Sexual Activity   Drug Use Not Currently     Social History     Tobacco Use   Smoking Status Former   • Packs/day: 1 50   • Years: 15 00   • Pack years: 22 50   • Types: Cigarettes   • Start date: 5   • Quit date: 2018   • Years since quittin 4   Smokeless Tobacco Never   Tobacco Comments    Stop smoking a month ago /Dec  2018     Family " History   Problem Relation Age of Onset   • Diabetes Mother    • COPD Mother    • Lung cancer Mother    • Lung cancer Father        Meds/Allergies   all current active meds have been reviewed, current meds:   Current Facility-Administered Medications   Medication Dose Route Frequency   • albuterol (PROVENTIL HFA,VENTOLIN HFA) inhaler 1 puff  1 puff Inhalation Q4H PRN   • albuterol inhalation solution 2 5 mg  2 5 mg Nebulization Q4H PRN   • chlorhexidine (PERIDEX) 0 12 % oral rinse 15 mL  15 mL Mouth/Throat Q12H NATIVIDAD   • dexamethasone (DECADRON) injection 4 mg  4 mg Intravenous Q6H [address]   • famotidine (PEPCID) tablet 40 mg  40 mg Oral Daily   • Fluticasone Furoate-Vilanterol 100-25 mcg/actuation 1 puff  1 puff Inhalation Daily    And   • umeclidinium 62 5 mcg/actuation inhaler AEPB 1 puff  1 puff Inhalation Daily   • folic acid (FOLVITE) tablet 1 mg  1 mg Oral Daily   • gabapentin (NEURONTIN) capsule 100 mg  100 mg Oral HS   • hydrALAZINE (APRESOLINE) injection 10 mg  10 mg Intravenous Once   • levETIRAcetam (KEPPRA) 500 mg in sodium chloride 0 9 % 100 mL IVPB  500 mg Intravenous Q12H NATIVIDAD   • multi-electrolyte (PLASMALYTE-A/ISOLYTE-S PH 7 4) IV solution  75 mL/hr Intravenous Continuous   • multivitamin-minerals (CENTRUM) tablet 1 tablet  1 tablet Oral Daily   • nicotine (NICODERM CQ) 7 mg/24hr TD 24 hr patch 7 mg  7 mg Transdermal Daily   • thiamine tablet 100 mg  100 mg Oral Daily    and PTA meds:   Prior to Admission Medications   Prescriptions Last Dose Informant Patient Reported? Taking?    Azelastine HCl 137 MCG/SPRAY SOLN   No No   Si SPRAYS INTO EACH NOSTRIL DAILY USE IN EACH NOSTRIL AS DIRECTED   Ibuprofen (ADVIL PO)   Yes No   Sig: Take by mouth Pt takes three Advil daily   PARoxetine (PAXIL) 10 mg tablet   No No   Sig: TAKE 1 TABLET BY MOUTH EVERY DAY   Patient not taking: Reported on 2022   albuterol (2 5 mg/3 mL) 0 083 % nebulizer solution   No No   Sig: Take 1 vial (2 5 mg total) by nebulization 3 (three) times a day   albuterol (PROVENTIL HFA,VENTOLIN HFA) 90 mcg/act inhaler   No No   Sig: INHALE 2 PUFFS EVERY 6 HOURS AS NEEDED FOR WHEEZING   famotidine (PEPCID) 40 MG tablet   No No   Sig: TAKE 1 TABLET BY MOUTH EVERY DAY   fluticasone (FLONASE) 50 mcg/act nasal spray   No No   Si sprays into each nostril daily   Patient not taking: Reported on 2023   fluticasone-umeclidinium-vilanterol (Trelegy Ellipta) 100-62 5-25 mcg/actuation inhaler   No No   Sig: Inhale 1 puff daily Rinse mouth after use  folic acid (FOLVITE) 1 mg tablet   No No   Sig: TAKE 1 TABLET BY MOUTH EVERY DAY   gabapentin (NEURONTIN) 100 mg capsule   No No   Sig: TAKE 1 CAPSULE BY MOUTH EVERYDAY AT BEDTIME   meloxicam (Mobic) 15 mg tablet   No No   Sig: Take 1 tablet (15 mg total) by mouth daily   methylPREDNISolone 4 MG tablet therapy pack   No No   Sig: Use as directed on package   Patient not taking: Reported on 3/29/2023   metoprolol succinate (TOPROL-XL) 25 mg 24 hr tablet   No No   Sig: TAKE 1 TABLET (25 MG TOTAL) BY MOUTH DAILY  naproxen (NAPROSYN) 500 mg tablet   No No   Sig: TAKE 1 TABLET BY MOUTH DAILY AS NEEDED FOR MILD PAIN   Patient not taking: Reported on 2023   omeprazole (PriLOSEC) 40 MG capsule   No No   Sig: TAKE 1 CAPSULE BY MOUTH EVERY DAY   thiamine (CVS B-1) 100 MG tablet   No No   Sig: Take 1 tablet (100 mg total) by mouth daily   Patient not taking: Reported on 2022      Facility-Administered Medications: None     Allergies   Allergen Reactions   • No Active Allergies        Objective   I/O        07 07 07 07 07 0700    I V    625    IV Piggyback   130    Total Intake   755    Urine  500 300    Total Output  500 300    Net  -500 +455                 Physical Exam  Constitutional:       Appearance: Normal appearance  She is well-developed  HENT:      Head: Normocephalic and atraumatic     Eyes:      Extraocular Movements: Extraocular movements intact and EOM normal    Neck:      Vascular: No JVD  Trachea: No tracheal deviation  Cardiovascular:      Rate and Rhythm: Normal rate  Pulmonary:      Effort: Pulmonary effort is normal  No respiratory distress  Musculoskeletal:         General: No deformity  Normal range of motion  Cervical back: Normal range of motion and neck supple  Skin:     General: Skin is warm and dry  Neurological:      Mental Status: She is alert  She is disoriented  Cranial Nerves: No cranial nerve deficit  Sensory: No sensory deficit  Motor: No weakness  Deep Tendon Reflexes: Reflexes are normal and symmetric  Psychiatric:         Speech: Speech normal          Behavior: Behavior normal          Thought Content: Thought content normal        Neurologic Exam     Mental Status   Oriented to person  Disoriented to place  Disoriented to time  Attention: decreased  Concentration: decreased  Speech: speech is normal   Level of consciousness: alert  Knowledge: poor  Unable to perform simple calculations  Able to repeat  Abnormal comprehension  Cranial Nerves     CN III, IV, VI   Extraocular motions are normal      CN VII   Facial expression full, symmetric  CN VIII   CN VIII normal    Hearing: intact    CN XII   CN XII normal    Tongue deviation: none    Motor Exam   Muscle bulk: decreased  Overall muscle tone: normalNo focal weakness appreciated however patient is much slower to move the right side  She does not appear to have any profound drift  She follows commands bilaterally  Sensory Exam   Light touch normal      Gait, Coordination, and Reflexes     Tremor   Resting tremor: absent  Action tremor: absent    Reflexes   Right Reina: absent  Left Reina: absent  Right ankle clonus: absent  Left ankle clonus: absent    Vitals:Blood pressure (!) 187/78, pulse 66, temperature 97 9 °F (36 6 °C), temperature source Oral, resp   rate 18, last menstrual period 06/02/2019, SpO2 100 % ,There is no height or weight on file to calculate BMI  Lab Results:   Results from last 7 days   Lab Units 05/21/23  1705   WBC Thousand/uL 8 28   HEMOGLOBIN g/dL 15 4   HEMATOCRIT % 44 9   PLATELETS Thousands/uL 232   NEUTROS PCT % 78*   MONOS PCT % 12     Results from last 7 days   Lab Units 05/21/23  1705   POTASSIUM mmol/L 3 5   CHLORIDE mmol/L 102   CO2 mmol/L 21   BUN mg/dL 25   CREATININE mg/dL 0 56*   CALCIUM mg/dL 9 8   ALK PHOS U/L 66   ALT U/L 9   AST U/L 22             Results from last 7 days   Lab Units 05/21/23  1705   INR  1 16   PTT seconds 31     No results found for: TROPONINT  ABG:No results found for: PHART, AAO7JAO, PO2ART, OXF4HDZ, H5NLWPSK, BEART, SOURCE    Imaging Studies: I have personally reviewed pertinent reports  and I have personally reviewed pertinent films in PACS    XR chest 1 view portable    Result Date: 5/22/2023  Impression: Right middle lobe opacity may represent pneumonia or atelectasis  Left hilar mass  The study was marked in [facility] for immediate notification    Workstation performed: [id number]     XR hip/pelv 2-3 vws right    Result Date: 5/22/2023  Impression: No acute osseous abnormality  Workstation performed: [id number]     CT head without contrast    Result Date: 5/21/2023  Impression: Multiple (at least 4) intracranial mass lesions as above including dominant left frontal/basal ganglia lesions with significant associated vasogenic edema in the left greater than right hemispheres highly concerning for metastases  Significant associated  mass effect including 1 cm rightward subfalcine herniation  Nonspecific marginal hyperattenuation suspicious for small amount of perilesional hemorrhage  Associated ventricular entrapment/obstructive hydrocephalus  Urgent neurosurgery consult and follow-up gadolinium-enhanced MRI of the brain advised  Above findings discussed with Dr Hayley Means at 6:30 p m  on 5/21/2023   Workstation performed: [id number]       EKG, Pathology, and Other Studies: I have personally reviewed pertinent reports  VTE Prophylaxis: Sequential compression device Donaldo Lapping)     Code Status: Level 1 - Full Code  Advance Directive and Living Will:      Power of :    POLST:      Counseling / Coordination of Care  I spent 30 minutes with the patient

## 2023-05-22 NOTE — ASSESSMENT & PLAN NOTE
· Hep C precautions   · Hep C antibody reactive, Haptoglobin 243,  in 2019   · Did not receive treatment   · LFTs within normal range

## 2023-05-22 NOTE — PROCEDURES
Intubation    Date/Time: 5/22/2023 6:30 PM  Performed by: Hector Gamez MD  Authorized by: Hector Gamez MD     Other Assisting Provider: Yes (comment) Ebenezer Montaño    Consent:     Consent obtained:  Emergent situation  Universal protocol:     Patient identity confirmed:  Arm band  Pre-procedure details:     Patient status:  Unresponsive  Indications:     Indications for intubation: airway protection    Procedure details:     Preoxygenation:  Bag valve mask    CPR in progress: no      Intubation method:  Oral    Oral intubation technique:  Glidescope    Laryngoscope size: Dacosta  Tube size (mm):  7 0    Tube type:  Cuffed    Number of attempts:  1    Ventilation between attempts: no      Tube visualized through cords: yes    Placement assessment:     ETT to lip:  22    Tube secured with: Adhesive tape    Breath sounds:  Equal    Placement verification: chest rise, condensation, direct visualization, equal breath sounds, ETCO2 detector and tube exhalation      CXR findings:  ETT in proper place (Advised to pull back 1-2cm)  Post-procedure details:     Patient tolerance of procedure:   Tolerated well, no immediate complications

## 2023-05-22 NOTE — RESPIRATORY THERAPY NOTE
RT Protocol Note  Vishal Alexandra 47 y o  female MRN: [id number]  [facility]: [id number]    Assessment    Principal Problem:    Brain mass  Active Problems:    Gastroesophageal reflux disease with hiatal hernia    Hepatitis C antibody test positive    Depression    COPD (chronic obstructive pulmonary disease) (HCC)    Encephalopathy    Alcohol abuse, continuous      Home Pulmonary Medications:  Trelegy and prn albuterol       Past Medical History:   Diagnosis Date    Anxiety     Depression     Hepatitis C      Social History     Socioeconomic History    Marital status: Single     Spouse name: Not on file    Number of children: Not on file    Years of education: Not on file    Highest education level: Not on file   Occupational History    Not on file   Tobacco Use    Smoking status: Former     Packs/day: 1 50     Years: 15 00     Pack years: 22 50     Types: Cigarettes     Start date:      Quit date: 2018     Years since quittin 4    Smokeless tobacco: Never    Tobacco comments:     Stop smoking a month ago /Dec  2018   Vaping Use    Vaping Use: Never used   Substance and Sexual Activity    Alcohol use:  Yes     Alcohol/week: 6 0 standard drinks     Types: 6 Cans of beer per week     Comment: occasional    Drug use: Not Currently    Sexual activity: Yes     Partners: Male   Other Topics Concern    Not on file   Social History Narrative    Not on file     Social Determinants of Health     Financial Resource Strain: Not on file   Food Insecurity: Not on file   Transportation Needs: Not on file   Physical Activity: Not on file   Stress: Not on file   Social Connections: Not on file   Intimate Partner Violence: Not on file   Housing Stability: Not on file       Subjective         Objective    Physical Exam:   Assessment Type: Assess only  General Appearance: Sleeping  Respiratory Pattern: Normal  Chest Assessment: Chest expansion symmetrical  Bilateral Breath Sounds: Clear    Vitals:  Blood pressure 157/77, pulse 70, temperature 98 6 °F (37 °C), temperature source Oral, resp  rate 18, last menstrual period 06/02/2019, SpO2 100 %  Imaging and other studies: I have personally reviewed pertinent reports  Plan    Respiratory Plan: Home Bronchodilator Patient pathway        Resp Comments: Pt presents with AMS  Has hx of COPD and asthma  Takes trelegy and prn albuterol at home  BS clear  No distress noted  Will continue with inhalers and prn albuterol

## 2023-05-22 NOTE — PROGRESS NOTES
[facility]  Progress Note  Name: Mickey Andrade  MRN: [id number]  Unit/Bed#: [facility] 512-70 I Date of Admission: 5/21/2023   Date of Service: 5/22/2023 I Hospital Day: 1    Assessment/Plan   * Brain mass  Assessment & Plan  · 1 month history of waxing/waning weakness  · Today patient with confusion and EMS called   · [facility] revealed: Multiple (at least 4) intracranial mass lesions as above including dominant left frontal/basal ganglia lesions with significant associated vasogenic edema in the left greater than right hemispheres highly concerning for metastases  Significant associated mass effect including 1 cm rightward subfalcine herniation  Nonspecific marginal hyperattenuation suspicious for small amount of perilesional hemorrhage  Associated ventricular entrapment/obstructive hydrocephalus  · CT C/A/P to r/o malignancy pending   · MRI/MRA pending  · Neurosurgery consulted   · Continue decadron 4mg q6hr   · Continue Keppra 500mg BID   · Continue q2hr neuro exams   · NPO until neurosurgical eval       Encephalopathy  Assessment & Plan  · Likely due to above     COPD (chronic obstructive pulmonary disease) ([facility] [address] )  Assessment & Plan  · Continue home regimen:   • Smoking cessation  • Trelegy Ellipta 100, 1 inhalation once a day  • Albuterol rescue inhaler 2 inhalations 4 times a day as needed  • Fluticasone nasal spray 2 sprays to each nostril once a day  • Astelin nasal spray 2 sprays to each nostril twice a day    • Follows with Dr Viridiana Watson as outpatient   • Nicotine patch ordered     Alcohol abuse, continuous  Assessment & Plan  · Drinks 6 beers daily   · Over the past week, patient with decreased alcohol intake (1 beer daily)  · CIWA ordered   · Seizure precautions     Depression  Assessment & Plan  · Home regimen:   · Paxil 10mg daily   · Hold in the setting of NPO, restart once able     Hepatitis C antibody test positive  Assessment & Plan  · Hep C precautions   · Hep C antibody reactive, Haptoglobin 243,  in 2019   · Did not receive treatment   · LFTs within normal range     Gastroesophageal reflux disease with hiatal hernia  Assessment & Plan  · Continue home PPI              ICU Core Measures     A: Assess, Prevent, and Manage Pain · Has pain been assessed? No  · Need for changes to pain regimen? No   B: Both SAT/SAT  · N/A   C: Choice of Sedation · RASS Goal: 0 Alert and Calm  · Need for changes to sedation or analgesia regimen? No   D: Delirium · CAM-ICU: Positive   E: Early Mobility  · Plan for early mobility? Yes   F: Family Engagement · Plan for family engagement today? Yes       Review of Invasive Devices: Fatima Plan: Fatima to be removed  Order has been placed        Prophylaxis:  VTE Contraindicated secondary to: Brain mass   Stress Ulcer  covered byfamotidine (PEPCID) tablet 40 mg [[id number]]       Subjective   HPI/24hr events: 47year old transferred in the setting of brain mass  1 month history of generalized weakness and new onset confusion started yesterday prompting ED evaluation  No history of colonoscopy/mammogram  Both parents passed away from lung cancer       Review of Systems   Unable to perform ROS: Mental status change           Objective                            Vitals I/O      Most Recent Min/Max in 24hrs   Temp 98 6 °F (37 °C) Temp  Min: 98 2 °F (36 8 °C)  Max: 98 6 °F (37 °C)   Pulse 70 Pulse  Min: 66  Max: 86   Resp 18 Resp  Min: 16  Max: 18   /77 BP  Min: 157/77  Max: 211/96   O2 Sat 100 % SpO2  Min: 94 %  Max: 100 %    No intake or output data in the 24 hours ending 05/22/23 0305      Diet NPO     Invasive Monitoring Physical exam   Neuro Invasive Monitoring   Most Recent  Min/Max in 24hrs    GCS 14  [place] Coma Scale Score  Min: 14  Max: 14    ICP     No data recorded    CPP (cuff)    No data recorded    CPP(ariella)    No data recorded   CVP   No data recorded    Physical Exam  Constitutional:       General: She is not in acute distress  Appearance: She is ill-appearing  Comments: Cachetic   HENT:      Head: Normocephalic and atraumatic  Nose: Nose normal       Mouth/Throat:      Mouth: Mucous membranes are moist       Pharynx: Oropharynx is clear  Eyes:      General: No scleral icterus  Conjunctiva/sclera: Conjunctivae normal       Pupils: Pupils are equal, round, and reactive to light  Pulmonary:      Effort: Pulmonary effort is normal  No respiratory distress  Musculoskeletal:         General: Normal range of motion  Cervical back: Normal range of motion and neck supple  Skin:     General: Skin is warm and dry  Coloration: Skin is pale  Neurological:      Mental Status: She is alert        Comments: Oriented to self; states it is 2003; moves all 4 extremities and follows commands              Diagnostic Studies      EKG: Reviewed       Imaging:  I have personally reviewed pertinent films in PACS     Medications:  Scheduled PRN   albuterol, 2 5 mg, TID  chlorhexidine, 15 mL, Q12H NATIVIDAD  dexamethasone, 4 mg, Q6H NATIVIDAD  famotidine, 40 mg, Daily  Fluticasone Furoate-Vilanterol, 1 puff, Daily   And  umeclidinium, 1 puff, Daily  folic acid, 1 mg, Daily  gabapentin, 100 mg, HS  levETIRAcetam, 500 mg, Q12H [address]  multivitamin-minerals, 1 tablet, Daily  nicotine, 7 mg, Daily  thiamine, 100 mg, Daily      albuterol, 1 puff, Q4H PRN       Continuous    multi-electrolyte, 75 mL/hr, Last Rate: 75 mL/hr (05/22/23 0027)         Labs:    CBC    Recent Labs     05/21/23  1705   WBC 8 28   HGB 15 4   HCT 44 9        BMP    Recent Labs     05/21/23  1705   SODIUM 139   K 3 5      CO2 21   AGAP 16*   BUN 25   CREATININE 0 56*   CALCIUM 9 8       Coags    Recent Labs     05/21/23  1705   INR 1 16   PTT 31        Additional Electrolytes  No recent results       Blood Gas    No recent results  No recent results LFTs  Recent Labs     05/21/23  1705   ALT 9   AST 22   ALKPHOS 66   ALB 4 3   TBILI 0 54 Infectious  No recent results  Glucose  Recent Labs     05/21/23  1705   GLUC 84               Critical Care Time Delivered: Upon my evaluation, this patient had a high probability of imminent or life-threatening deterioration due to 20, which required my direct attention, intervention, and personal management  I have personally provided brain mass minutes of critical care time, exclusive of procedures, teaching, family meetings, and any prior time recorded by providers other than myself       Tavia Bonilla PA-C

## 2023-05-22 NOTE — PLAN OF CARE
Problem: SAFETY,RESTRAINT: NV/NON-SELF DESTRUCTIVE BEHAVIOR  Goal: Remains free of harm/injury (restraint for non violent/non self-detsructive behavior)  Description: INTERVENTIONS:  - Instruct patient/family regarding restraint use   - Assess and monitor physiologic and psychological status   - Provide interventions and comfort measures to meet assessed patient needs   - Identify and implement measures to help patient regain control  - Assess readiness for release of restraint   Outcome: Progressing  Goal: Returns to optimal restraint-free functioning  Description: INTERVENTIONS:  - Assess the patient's behavior and symptoms that indicate continued need for restraint  - Identify and implement measures to help patient regain control  - Assess readiness for release of restraint   Outcome: Progressing     Problem: PAIN - ADULT  Goal: Verbalizes/displays adequate comfort level or baseline comfort level  Description: Interventions:  - Encourage patient to monitor pain and request assistance  - Assess pain using appropriate pain scale  - Administer analgesics based on type and severity of pain and evaluate response  - Implement non-pharmacological measures as appropriate and evaluate response  - Consider cultural and social influences on pain and pain management  - Notify physician/advanced practitioner if interventions unsuccessful or patient reports new pain  Outcome: Progressing     Problem: INFECTION - ADULT  Goal: Absence or prevention of progression during hospitalization  Description: INTERVENTIONS:  - Assess and monitor for signs and symptoms of infection  - Monitor lab/diagnostic results  - Monitor all insertion sites, i e  indwelling lines, tubes, and drains  - Monitor endotracheal if appropriate and nasal secretions for changes in amount and color  - [place] appropriate cooling/warming therapies per order  - Administer medications as ordered  - Instruct and encourage patient and family to use good hand hygiene technique  - Identify and instruct in appropriate isolation precautions for identified infection/condition  Outcome: Progressing  Goal: Absence of fever/infection during neutropenic period  Description: INTERVENTIONS:  - Monitor WBC    Outcome: Progressing     Problem: SAFETY ADULT  Goal: Patient will remain free of falls  Description: INTERVENTIONS:  - Educate patient/family on patient safety including physical limitations  - Instruct patient to call for assistance with activity   - Consult OT/PT to assist with strengthening/mobility   - Keep Call bell within reach  - Keep bed low and locked with side rails adjusted as appropriate  - Keep care items and personal belongings within reach  - Initiate and maintain comfort rounds  - Make Fall Risk Sign visible to staff  - Offer Toileting every 2 Hours, in advance of need  - Initiate/Maintain  bed alarm  - Apply yellow socks and bracelet for high fall risk patients  - Consider moving patient to room near nurses station  Outcome: Progressing  Goal: Maintain or return to baseline ADL function  Description: INTERVENTIONS:  -  Assess patient's ability to carry out ADLs; assess patient's baseline for ADL function and identify physical deficits which impact ability to perform ADLs (bathing, care of mouth/teeth, toileting, grooming, dressing, etc )  - Assess/evaluate cause of self-care deficits   - Assess range of motion  - Assess patient's mobility; develop plan if impaired  - Assess patient's need for assistive devices and provide as appropriate  - Encourage maximum independence but intervene and supervise when necessary  - Involve family in performance of ADLs  - Assess for home care needs following discharge   - Consider OT consult to assist with ADL evaluation and planning for discharge  - Provide patient education as appropriate  Outcome: Progressing  Goal: Maintains/Returns to pre admission functional level  Description: INTERVENTIONS:  - Perform BMAT or MOVE assessment daily    - Set and communicate daily mobility goal to care team and patient/family/caregiver  - Collaborate with rehabilitation services on mobility goals if consulted  - Out of bed for toileting  - Record patient progress and toleration of activity level   Outcome: Progressing     Problem: DISCHARGE PLANNING  Goal: Discharge to home or other facility with appropriate resources  Description: INTERVENTIONS:  - Identify barriers to discharge w/patient and caregiver  - Arrange for needed discharge resources and transportation as appropriate  - Identify discharge learning needs (meds, wound care, etc )  - Arrange for interpretive services to assist at discharge as needed  - Refer to Case Management Department for coordinating discharge planning if the patient needs post-hospital services based on physician/advanced practitioner order or complex needs related to functional status, cognitive ability, or social support system  Outcome: Progressing     Problem: Knowledge Deficit  Goal: Patient/family/caregiver demonstrates understanding of disease process, treatment plan, medications, and discharge instructions  Description: Complete learning assessment and assess knowledge base    Interventions:  - Provide teaching at level of understanding  - Provide teaching via preferred learning methods  Outcome: Progressing     Problem: MOBILITY - ADULT  Goal: Maintain or return to baseline ADL function  Description: INTERVENTIONS:  -  Assess patient's ability to carry out ADLs; assess patient's baseline for ADL function and identify physical deficits which impact ability to perform ADLs (bathing, care of mouth/teeth, toileting, grooming, dressing, etc )  - Assess/evaluate cause of self-care deficits   - Assess range of motion  - Assess patient's mobility; develop plan if impaired  - Assess patient's need for assistive devices and provide as appropriate  - Encourage maximum independence but intervene and supervise when necessary  - Involve family in performance of ADLs  - Assess for home care needs following discharge   - Consider OT consult to assist with ADL evaluation and planning for discharge  - Provide patient education as appropriate  Outcome: Progressing  Goal: Maintains/Returns to pre admission functional level  Description: INTERVENTIONS:  - Perform BMAT or MOVE assessment daily    - Set and communicate daily mobility goal to care team and patient/family/caregiver  - Collaborate with rehabilitation services on mobility goals if consulted  - Perform Range of Motion 3 times a day  - Reposition patient every 2 hours      - Out of bed for toileting  - Record patient progress and toleration of activity level   Outcome: Progressing     Problem: Prexisting or High Potential for Compromised Skin Integrity  Goal: Skin integrity is maintained or improved  Description: INTERVENTIONS:  - Identify patients at risk for skin breakdown  - Assess and monitor skin integrity  - Assess and monitor nutrition and hydration status  - Monitor labs   - Assess for incontinence   - Turn and reposition patient  - Assist with mobility/ambulation  - Relieve pressure over bony prominences  - Avoid friction and shearing  - Provide appropriate hygiene as needed including keeping skin clean and dry  - Evaluate need for skin moisturizer/barrier cream  - Collaborate with interdisciplinary team   - Patient/family teaching  - Consider wound care consult   Outcome: Progressing

## 2023-05-22 NOTE — TELEPHONE ENCOUNTER
Call from patient caregiver Nikolai Riley called in requesting a call back from [address] [place] [address] regarding patient  Nikolai Riley mentioned that it's urgent

## 2023-05-22 NOTE — H&P
[facility]  H&P  Name: Juan Manuel Ceballos 47 y o  female I MRN: [id number]  [facility] Date of Admission: 5/21/2023   Date of Service: 5/21/2023 I Hospital Day: 0      Assessment/Plan   * Brain mass  Assessment & Plan  · 1 month history of waxing/waning weakness  · Today patient with confusion and EMS called   · [facility] revealed: Multiple (at least 4) intracranial mass lesions as above including dominant left frontal/basal ganglia lesions with significant associated vasogenic edema in the left greater than right hemispheres highly concerning for metastases  Significant associated mass effect including 1 cm rightward subfalcine herniation  Nonspecific marginal hyperattenuation suspicious for small amount of perilesional hemorrhage  Associated ventricular entrapment/obstructive hydrocephalus  · CT C/A/P to r/o malignancy pending   · MRI/MRA pending  · Neurosurgery consulted   · Continue decadron 4mg q6hr   · Continue Keppra 500mg BID   · Continue q2hr neuro exams   · NPO until neurosurgical eval       Encephalopathy  Assessment & Plan  · Likely due to above     COPD (chronic obstructive pulmonary disease) ([facility] [address] )  Assessment & Plan  · Continue home regimen:   • Smoking cessation  • Trelegy Ellipta 100, 1 inhalation once a day  • Albuterol rescue inhaler 2 inhalations 4 times a day as needed  • Fluticasone nasal spray 2 sprays to each nostril once a day  • Astelin nasal spray 2 sprays to each nostril twice a day    • Follows with Dr Hunter Nixon as outpatient   • Nicotine patch ordered     Alcohol abuse, continuous  Assessment & Plan  · Drinks 6 beers daily   · Over the past week, patient with decreased alcohol intake   · CIWA ordered   · Seizure precautions     Depression  Assessment & Plan  · Home regimen:   · Paxil 10mg daily   · Hold in the setting of NPO, restart once able     Hepatitis C antibody test positive  Assessment & Plan  · Hep C precautions   · Hep C antibody reactive, Haptoglobin 243,  in 2019   · Did not receive treatment   · LFTs within normal range     Gastroesophageal reflux disease with hiatal hernia  Assessment & Plan  · Continue home PPI            History of Present Illness     HPI: Sabas Weston is a 47 y o  who presents with acute onset confusion with 1 month history of generalized weakness  PMH includes depression, alcohol abuse, COPD, tobacco abuse, GERD, and Hep C  Sister went to check on patient at home and called 911 due to confusion  Per family, patient lives with boyfriend and 16year old son  Boyfriend leaves for work for 8 hour shifts and over the past month she would be in the same place as when he left  He leaves her 6 beers and 5 cigarettes  Over the past week, she has only had 1 beer and 1 cigarette daily  Patient also not making dinner, as she usually does  In addiction, she has had persistent R hip pain that her family doctor has ordered an Xray for as he believes it is sciatica  Xray completed and pending  To note, patient has had gradual weihht loss over the last month without trying  History obtained from sibling and chart review  Review of Systems   Unable to perform ROS: Mental status change       Historical Information   Past Medical History:  No date:  Anxiety  No date: Depression  No date: Hepatitis C Past Surgical History:  No date: BUNIONECTOMY; Left   Current Outpatient Medications   Medication Instructions   • albuterol (PROVENTIL HFA,VENTOLIN HFA) 90 mcg/act inhaler INHALE 2 PUFFS EVERY 6 HOURS AS NEEDED FOR WHEEZING   • albuterol 2 5 mg, Nebulization, 3 times daily   • Azelastine HCl 137 MCG/SPRAY SOLN 2 SPRAYS INTO EACH NOSTRIL DAILY USE IN EACH NOSTRIL AS DIRECTED   • famotidine (PEPCID) 40 MG tablet TAKE 1 TABLET BY MOUTH EVERY DAY   • fluticasone (FLONASE) 50 mcg/act nasal spray 2 sprays, Nasal, Daily   • fluticasone-umeclidinium-vilanterol (Trelegy Ellipta) 100-62 5-25 mcg/actuation inhaler 1 puff, Inhalation, Daily, Rinse mouth after use  • folic acid (FOLVITE) 1 mg tablet TAKE 1 TABLET BY MOUTH EVERY DAY   • gabapentin (NEURONTIN) 100 mg capsule TAKE 1 CAPSULE BY MOUTH EVERYDAY AT BEDTIME   • Ibuprofen (ADVIL PO) Oral, Pt takes three Advil daily   • meloxicam (MOBIC) 15 mg, Oral, Daily   • methylPREDNISolone 4 MG tablet therapy pack Use as directed on package   • metoprolol succinate (TOPROL-XL) 25 mg, Oral, Daily   • naproxen (NAPROSYN) 500 mg tablet TAKE 1 TABLET BY MOUTH DAILY AS NEEDED FOR MILD PAIN   • omeprazole (PriLOSEC) 40 MG capsule TAKE 1 CAPSULE BY MOUTH EVERY DAY   • PARoxetine (PAXIL) 10 mg tablet TAKE 1 TABLET BY MOUTH EVERY DAY   • thiamine ([employer] B-1) 100 mg, Oral, Daily    Allergies   Allergen Reactions   • No Active Allergies       Social History     Tobacco Use   • Smoking status: Former     Packs/day: 1 50     Years: 15 00     Pack years: 22 50     Types: Cigarettes     Start date: 5     Quit date: 2018     Years since quittin 4   • Smokeless tobacco: Never   • Tobacco comments:     Stop smoking a month ago /Dec  2018   Vaping Use   • Vaping Use: Never used   Substance Use Topics   • Alcohol use:  Yes     Alcohol/week: 6 0 standard drinks     Types: 6 Cans of beer per week     Comment: occasional   • Drug use: Not Currently    Family History   Problem Relation Age of Onset   • Diabetes Mother    • COPD Mother    • Lung cancer Mother    • Lung cancer Father           Objective                            Vitals I/O      Most Recent Min/Max in 24hrs   Temp   Temp  Min: 98 2 °F (36 8 °C)  Max: 98 2 °F (36 8 °C)   Pulse   Pulse  Min: 66  Max: 86   Resp   Resp  Min: 16  Max: 18   BP   BP  Min: 158/80  Max: 211/96   O2 Sat   SpO2  Min: 94 %  Max: 99 %    No intake or output data in the 24 hours ending 23 2335      Diet NPO     Invasive Monitoring Physical exam   Neuro Invasive Monitoring   Most Recent  Min/Max in 24hrs    GCS    [place] Coma Scale Score  Min: 14  Max: 14    ICP     No data recorded    CPP (cuff)    No data recorded    CPP(ariella)    No data recorded   CVP   No data recorded    Physical Exam  Constitutional:       Comments: Cachetic   HENT:      Head: Normocephalic and atraumatic  Nose: Nose normal       Mouth/Throat:      Mouth: Mucous membranes are dry  Eyes:      General: No scleral icterus  Conjunctiva/sclera: Conjunctivae normal       Pupils: Pupils are equal, round, and reactive to light  Cardiovascular:      Rate and Rhythm: Normal rate  Pulmonary:      Effort: Pulmonary effort is normal  No respiratory distress  Musculoskeletal:         General: Normal range of motion  Cervical back: Normal range of motion  Skin:     General: Skin is warm and dry  Coloration: Skin is pale  Neurological:      Mental Status: She is alert  She is disoriented        Comments: Patient able to move all 4 extremities; she is confused and will answer name only; patient re-directable at this time              Diagnostic Studies      EKG: Reviewed     Imaging:  I have personally reviewed pertinent films in PACS     Medications:  Scheduled PRN   albuterol, 2 5 mg, TID  chlorhexidine, 15 mL, Q12H [facility]  [START ON 5/22/2023] dexamethasone, 4 mg, Q6H [facility]  [START ON 5/22/2023] famotidine, 40 mg, Daily  [START ON 5/22/2023] Fluticasone Furoate-Vilanterol, 1 puff, Daily   And  [START ON 5/22/2023] umeclidinium, 1 puff, Daily  [START ON 6/08/7897] folic acid, 1 mg, Daily  gabapentin, 100 mg, HS  levETIRAcetam, 500 mg, Q12H [facility]  [START ON 5/22/2023] multivitamin-minerals, 1 tablet, Daily  nicotine, 7 mg, Daily  [START ON 5/22/2023] thiamine, 100 mg, Daily      albuterol, 1 puff, Q4H PRN       Continuous    multi-electrolyte, 75 mL/hr         Labs:    CBC    Recent Labs     05/21/23  1705   WBC 8 28   HGB 15 4   HCT 44 9        BMP    Recent Labs     05/21/23  1705   SODIUM 139   K 3 5      CO2 21   AGAP 16*   BUN 25   CREATININE 0 56*   CALCIUM 9 8       Coags    Recent Labs     05/21/23  1705   INR 1 16   PTT 31        Additional Electrolytes  No recent results       Blood Gas    No recent results  No recent results LFTs  Recent Labs     05/21/23  1705   ALT 9   AST 22   ALKPHOS 66   ALB 4 3   TBILI 0 54       Infectious  No recent results  Glucose  Recent Labs     05/21/23  1705   GLUC 84             Critical Care Time Delivered: Upon my evaluation, this patient had a high probability of imminent or life-threatening deterioration due to brain mass, which required my direct attention, intervention, and personal management  I have personally provided 35 minutes of critical care time, exclusive of procedures, teaching, family meetings, and any prior time recorded by providers other than myself     Anticipated Length of Stay is > 2 midnights  [place], [place]

## 2023-05-22 NOTE — ED NOTES
Attempted to call reports to number received from [facility] [phone] and number to [address] [phone] [phone] both with no answer x2      Priya Lacey RN  05/21/23 2052

## 2023-05-22 NOTE — RESPIRATORY THERAPY NOTE
Resp Care   05/22/23 0830   Respiratory Assessment   Assessment Type Assess only   General Appearance Awake; Alert   Respiratory Pattern Normal   Chest Assessment Chest expansion symmetrical   Bilateral Breath Sounds Clear   Resp Comments pt found on ra, bs are diminished, will continue to monitor per resp protocol     Additional Assessments   Pulse 63   Respirations 18   SpO2 100 %

## 2023-05-22 NOTE — UTILIZATION REVIEW
Initial Clinical Review    Admission: Date/Time/Statement:   Admission Orders (From admission, onward)     Ordered        05/21/23 2109  Inpatient Admission  Once                      Orders Placed This Encounter   Procedures   • Inpatient Admission     Standing Status:   Standing     Number of Occurrences:   1     Order Specific Question:   Level of Care     Answer:   Level 1 Stepdown [13]     Order Specific Question:   Estimated length of stay     Answer:   More than 2 Midnights     Order Specific Question:   Certification     Answer:   I certify that inpatient services are medically necessary for this patient for a duration of greater than two midnights  See H&P and MD Progress Notes for additional information about the patient's course of treatment  ED Arrival Information     Patient not seen in ED                       Initial Presentation: 47 y o  female, Transfer from [facility] ED presents for Acute onset confusion with 1 month history of generalized weakness  Sister went to check on patient at home and called 911 due to confusion  Per family, patient lives with boyfriend and 16year old son  Boyfriend leaves for work for 8 hour shifts and over the past month she would be in the same place as when he left  He leaves her 6 beers and 5 cigarettes  Over the past week, she has only had 1 beer and 1 cigarette daily  Does not make dinner as he usually does  Also c/o persistent R hip pain  Xray was done by PCP and believes it is sciatica  Pt with gradual wt loss over the last month unintentional  PMH for Depression, Alcohol abuse, COPD, tobacco abuse, GERD and Hep C  Admit Inpatient level of care for Brain mass, Encephalopathy, Alcohol abuse, continuous  Waxing/ Waning weakness x1 month history  CT C/A/P to r/o malignancy pending  MRI/ MRA pending  Neurosurgery consult  Continue Iv Decadron and Iv Keppra  Neuro checks q2h  NPO until Neurosurgical evaluation  Neurosurgery consult   Drinks 6 beers daily, JEBWA assess  Seizure precautions  5/21 CTH revealed: Multiple (at least 4) intracranial mass lesions as above including dominant left frontal/basal ganglia lesions with significant associated vasogenic edema in the left greater than right hemispheres highly concerning for metastases  Significant associated mass effect including 1 cm rightward subfalcine herniation  Nonspecific marginal hyperattenuation suspicious for small amount of perilesional hemorrhage  Associated ventricular entrapment/obstructive hydrocephalus    Date: 5/22   Day 2:   Progress notes; Continue Iv Decadron and Iv Keppra  Neurosurgery consult pending  Neurosurgery cons; Brain mass  Ongoing frequent neuro checks  MRI Brain w and w/o contrast  Brain tumor protocol  Continue Iv Keppra for seizure ppx  Hold all AC/AP meds  CT CAP with lung mass on L- formal read pending  Continue to follow         5/23 OR - S/p Left - left frontal CRANIOTOMY IMAGE-GUIDED FOR TUMOR  Operative Findings:  2 large metastatic lesions resected, frozen pathology consistent with metastatic carcinoma    Vitals   Temperature Pulse Respirations Blood Pressure SpO2   05/21/23 2120 05/21/23 2120 05/21/23 2120 05/21/23 2120 05/21/23 2201   98 6 °F (37 °C) 70 18 157/77 100 %      Temp Source Heart Rate Source Patient Position - Orthostatic VS BP Location FiO2 (%)   05/21/23 2120 05/22/23 0716 -- -- --   Oral Monitor         Pain Score       05/21/23 2200       No Pain          Wt Readings from Last 1 Encounters:   04/24/23 40 8 kg (90 lb)     Additional Vital Signs:   05/22/23 1326 -- -- -- 189/112   Abnormal  -- -- --   05/22/23 1200 -- 50   Abnormal  18 165/78 112 100 % None (Room air)   05/22/23 1100 -- -- -- 158/71 -- -- --   05/22/23 1054 -- -- -- 187/78   Abnormal  -- --      05/22/23 07:16:33 97 9 °F (36 6 °C) 55 18 165/79 -- 100 % None (Room air)   05/21/23 2201 -- -- -- -- -- 100 % None (Room air)   05/21/23 2200 -- 86 -- 151/81 -- -- --     Pertinent Labs/Diagnostic Test Results:   CT chest abdomen pelvis w contrast   Final Result by Shanique Restrepo MD (05/22 1126)      3 x 2 8 x 2 6 cm mass superior segment of the left lower lobe highly suspicious for primary lung cancer  Additional scattered bilateral subcentimeter nodules are indeterminate  Upper lobe predominant emphysema  No metastatic disease in the abdomen or pelvis  MRI brain w wo contrast  (5/22) -  Multiple enhancing lesions identified within the brain parenchyma consistent with metastatic disease  The largest lesions are seen within the left hemisphere involving the insular region and anterior left frontal lobe with irregular peripheral enhancement and extensive surrounding vasogenic edema  Multiple additional smaller lesions are scattered within the brain parenchyma  The edema and mass effect results in approximately 8 mm of left to right shift with partial effacement of the suprasellar cistern  5/21  CT Head - Multiple (at least 4) intracranial mass lesions as above including dominant left frontal/basal ganglia lesions with significant associated vasogenic edema in the left greater than right hemispheres highly concerning for metastases  Significant associated   mass effect including 1 cm rightward subfalcine herniation  Nonspecific marginal hyperattenuation suspicious for small amount of perilesional hemorrhage  Associated ventricular entrapment/obstructive hydrocephalus  Urgent neurosurgery consult and follow-up gadolinium-enhanced MRI of the brain advised      XR Right Hip/Pelvis - No acute osseous abnormality          Results from last 7 days   Lab Units 05/21/23  1705   WBC Thousand/uL 8 28   HEMOGLOBIN g/dL 15 4   HEMATOCRIT % 44 9   PLATELETS Thousands/uL 232   NEUTROS ABS Thousands/µL 6 33         Results from last 7 days   Lab Units 05/21/23  1705   SODIUM mmol/L 139   POTASSIUM mmol/L 3 5   CHLORIDE mmol/L 102   CO2 mmol/L 21   ANION GAP mmol/L 16*   BUN mg/dL 25   CREATININE mg/dL 0 56*   EGFR ml/min/1 73sq m 107   CALCIUM mg/dL 9 8     Results from last 7 days   Lab Units 05/21/23  1705   AST U/L 22   ALT U/L 9   ALK PHOS U/L 66   TOTAL PROTEIN g/dL 7 3   ALBUMIN g/dL 4 3   TOTAL BILIRUBIN mg/dL 0 54     Results from last 7 days   Lab Units 05/21/23  1649   POC GLUCOSE mg/dl 84     Results from last 7 days   Lab Units 05/21/23  1705   GLUCOSE RANDOM mg/dL 84       Results from last 7 days   Lab Units 05/21/23  1705   PROTIME seconds 14 8*   INR  1 16   PTT seconds 31     Results from last 7 days   Lab Units 05/21/23  1705   TSH 3RD GENERATON uIU/mL 0 725         Results from last 7 days   Lab Units 05/21/23  1705   LACTIC ACID mmol/L 0 7       Results from last 7 days   Lab Units 05/21/23  1945   CLARITY UA  Clear   COLOR UA  Yellow   SPEC GRAV UA  >=1 030   PH UA  5 5   GLUCOSE UA mg/dl Negative   KETONES UA mg/dl 40 (2+)*   BLOOD UA  Trace*   PROTEIN UA mg/dl 30 (1+)*   NITRITE UA  Positive*   BILIRUBIN UA  Negative   UROBILINOGEN UA E U /dl 1 0   LEUKOCYTES UA  Negative                 Results from last 7 days   Lab Units 05/21/23  1705   ETHANOL LVL mg/dL <10   ACETAMINOPHEN LVL ug/mL <88*   SALICYLATE LVL mg/dL <5                 Results from last 7 days   Lab Units 05/22/23  0027 05/21/23  1735   BLOOD CULTURE  Received in Microbiology Lab  Culture in Progress  Received in Microbiology Lab  Culture in Progress  Received in Microbiology Lab  Culture in Progress  Received in Microbiology Lab  Culture in Progress         Past Medical History:   Diagnosis Date   • Anxiety    • Depression    • Hepatitis C      Present on Admission:  • Brain mass  • COPD (chronic obstructive pulmonary disease) ([facility] [address] )  • Depression  • Gastroesophageal reflux disease with hiatal hernia  • Hepatitis C antibody test positive  • Encephalopathy  • Alcohol abuse, continuous      Admitting Diagnosis: Brain mass [G93 89]  Age/Sex: 47 y o  female     Admission Orders:  Scheduled Medications:  chlorhexidine, 15 mL, Mouth/Throat, Q12H [address]  dexamethasone, 6 mg, Intravenous, Q6H NATIVIDAD  famotidine, 40 mg, Oral, Daily  Fluticasone Furoate-Vilanterol, 1 puff, Inhalation, Daily   And  umeclidinium, 1 puff, Inhalation, Daily  folic acid, 1 mg, Oral, Daily  gabapentin, 100 mg, Oral, HS  hydrALAZINE, 10 mg, Intravenous, Once  levETIRAcetam, 500 mg, Intravenous, Q12H NATIVIDAD  midazolam, 2 mg, Intravenous, Once  multivitamin-minerals, 1 tablet, Oral, Daily  nicotine, 7 mg, Transdermal, Daily  pantoprazole, 40 mg, Intravenous, Q24H [address]  thiamine, 100 mg, Oral, Daily      Continuous IV Infusions:  multi-electrolyte, 75 mL/hr, Intravenous, Continuous      PRN Meds:  acetaminophen, 325 mg, Rectal, Q4H PRN  albuterol, 1 puff, Inhalation, Q4H PRN  albuterol, 2 5 mg, Nebulization, Q4H PRN  hydrALAZINE, 10 mg, Intravenous, Q4H PRN 5/22 x1      Bld culture x2  Speech bedside swallow eval and treat  IP CONSULT TO CASE MANAGEMENT  IP CONSULT TO NEUROSURGERY    Network Utilization Review Department  ATTENTION: Please call with any questions or concerns to [phone] and carefully listen to the prompts so that you are directed to the right person  All voicemails are confidential   Andrew Standard all requests for admission clinical reviews, approved or denied determinations and any other requests to dedicated fax number below belonging to the campus where the patient is receiving treatment   List of dedicated fax numbers for the Facilities:  [address] [place] [address] DENIALS (Administrative/Medical Necessity) [phone]   [address] [place] [address] (Maternity/NICU/Pediatrics) [phone]   [address] [place]  [phone]   [address] [place] [id number] [place] [address] [address] [facility] [address] 818 [facility] [phone]

## 2023-05-22 NOTE — SEPSIS NOTE
Sepsis Note   Victoria Anderson 47 y o  female MRN: [id number]  Unit/Bed#: ICU 09 Encounter: [id number]       Initial Sepsis Screening     [address] Name 05/22/23 1955                Is the patient's history suggestive of a new or worsening infection? No  -LB              User Key  (r) = Recorded By, (t) = Taken By, (c) = Cosigned By    [address] Name Provider Type    LB MARIA GUADALUPE Greenberg Nurse Practitioner                    There is no height or weight on file to calculate BMI  Wt Readings from Last 1 Encounters:   04/24/23 40 8 kg (90 lb)        Ideal body weight: 48 8 kg (107 lb 8 4 oz)     Pt with brain mass and worsening edema  Just intubated for worsening mental status  No signs of infection

## 2023-05-22 NOTE — ASSESSMENT & PLAN NOTE
· Drinks 6 beers daily   · Over the past week, patient with decreased alcohol intake   · CIWA ordered   · Seizure precautions

## 2023-05-23 ENCOUNTER — TELEPHONE (OUTPATIENT)
Dept: PULMONOLOGY | Facility: CLINIC | Age: 53
End: 2023-05-23

## 2023-05-23 ENCOUNTER — ANESTHESIA (INPATIENT)
Dept: PERIOP | Facility: HOSPITAL | Age: 53
End: 2023-05-23

## 2023-05-23 ENCOUNTER — ANESTHESIA EVENT (INPATIENT)
Dept: PERIOP | Facility: HOSPITAL | Age: 53
End: 2023-05-23

## 2023-05-23 RX ORDER — SODIUM CHLORIDE 9 MG/ML
INJECTION, SOLUTION INTRAVENOUS CONTINUOUS PRN
Status: DISCONTINUED | OUTPATIENT
Start: 2023-05-23 | End: 2023-05-23

## 2023-05-23 RX ORDER — FUROSEMIDE 10 MG/ML
INJECTION INTRAMUSCULAR; INTRAVENOUS AS NEEDED
Status: DISCONTINUED | OUTPATIENT
Start: 2023-05-23 | End: 2023-05-23

## 2023-05-23 RX ORDER — EPINEPHRINE IN SOD CHLOR,ISO 1 MG/10 ML
SYRINGE (ML) INTRAVENOUS AS NEEDED
Status: DISCONTINUED | OUTPATIENT
Start: 2023-05-23 | End: 2023-05-23

## 2023-05-23 RX ORDER — ROCURONIUM BROMIDE 10 MG/ML
INJECTION, SOLUTION INTRAVENOUS AS NEEDED
Status: DISCONTINUED | OUTPATIENT
Start: 2023-05-23 | End: 2023-05-23

## 2023-05-23 RX ORDER — CEFAZOLIN SODIUM 1 G/3ML
INJECTION, POWDER, FOR SOLUTION INTRAMUSCULAR; INTRAVENOUS AS NEEDED
Status: DISCONTINUED | OUTPATIENT
Start: 2023-05-23 | End: 2023-05-23

## 2023-05-23 RX ORDER — ONDANSETRON 2 MG/ML
INJECTION INTRAMUSCULAR; INTRAVENOUS AS NEEDED
Status: DISCONTINUED | OUTPATIENT
Start: 2023-05-23 | End: 2023-05-23

## 2023-05-23 RX ORDER — MANNITOL 250 MG/ML
INJECTION, SOLUTION INTRAVENOUS AS NEEDED
Status: DISCONTINUED | OUTPATIENT
Start: 2023-05-23 | End: 2023-05-23

## 2023-05-23 RX ORDER — DEXAMETHASONE SODIUM PHOSPHATE 10 MG/ML
INJECTION, SOLUTION INTRAMUSCULAR; INTRAVENOUS AS NEEDED
Status: DISCONTINUED | OUTPATIENT
Start: 2023-05-23 | End: 2023-05-23

## 2023-05-23 RX ORDER — POTASSIUM CHLORIDE 14.9 MG/ML
INJECTION INTRAVENOUS CONTINUOUS PRN
Status: DISCONTINUED | OUTPATIENT
Start: 2023-05-23 | End: 2023-05-23

## 2023-05-23 RX ORDER — FENTANYL CITRATE 50 UG/ML
INJECTION, SOLUTION INTRAMUSCULAR; INTRAVENOUS AS NEEDED
Status: DISCONTINUED | OUTPATIENT
Start: 2023-05-23 | End: 2023-05-23

## 2023-05-23 RX ADMIN — MANNITOL 50 G: 12.5 INJECTION, SOLUTION INTRAVENOUS at 08:41

## 2023-05-23 RX ADMIN — ONDANSETRON 4 MG: 2 INJECTION INTRAMUSCULAR; INTRAVENOUS at 08:42

## 2023-05-23 RX ADMIN — ROCURONIUM BROMIDE 30 MG: 10 INJECTION, SOLUTION INTRAVENOUS at 09:44

## 2023-05-23 RX ADMIN — DEXAMETHASONE SODIUM PHOSPHATE 10 MG: 10 INJECTION, SOLUTION INTRAMUSCULAR; INTRAVENOUS at 08:42

## 2023-05-23 RX ADMIN — POTASSIUM CHLORIDE: 14.9 INJECTION, SOLUTION INTRAVENOUS at 10:39

## 2023-05-23 RX ADMIN — SODIUM CHLORIDE: 0.9 INJECTION, SOLUTION INTRAVENOUS at 08:31

## 2023-05-23 RX ADMIN — EPINEPHRINE 2 MCG/MIN: 1 INJECTION INTRAMUSCULAR; INTRAVENOUS; SUBCUTANEOUS at 08:27

## 2023-05-23 RX ADMIN — CEFAZOLIN 2000 MG: 1 INJECTION, POWDER, FOR SOLUTION INTRAMUSCULAR; INTRAVENOUS at 09:08

## 2023-05-23 RX ADMIN — FENTANYL CITRATE 100 MCG: 50 INJECTION, SOLUTION INTRAMUSCULAR; INTRAVENOUS at 09:06

## 2023-05-23 RX ADMIN — INSULIN HUMAN 5 UNITS: 100 INJECTION, SOLUTION PARENTERAL at 10:44

## 2023-05-23 RX ADMIN — ROCURONIUM BROMIDE 20 MG: 10 INJECTION, SOLUTION INTRAVENOUS at 11:52

## 2023-05-23 RX ADMIN — EPINEPHRINE 10 MCG: 0.1 INJECTION INTRAVENOUS at 08:39

## 2023-05-23 RX ADMIN — ROCURONIUM BROMIDE 20 MG: 10 INJECTION, SOLUTION INTRAVENOUS at 08:46

## 2023-05-23 RX ADMIN — ROCURONIUM BROMIDE 30 MG: 10 INJECTION, SOLUTION INTRAVENOUS at 08:38

## 2023-05-23 RX ADMIN — FUROSEMIDE 20 MG: 10 INJECTION, SOLUTION INTRAVENOUS at 09:15

## 2023-05-23 NOTE — PROGRESS NOTES
Patient seen and examined urgently this morning at approximately 7:45 AM     He had a significant clinical decompensation at the change of shift last night requiring intubation  During this process she was found to have anisocoria with a large left pupil  She was given mannitol to help decrease her intracranial pressure which allowed for the people to come down  Ultimately she was started on sedation and stabilized  An MRI was performed  Upon reviewing the MRI there are multiple intracranial mets including 2 predominant left frontal mets 1 immediately near Broca's and the second more anteriorly  Both of these are quite large with significant amount of vasogenic edema  She has also multiple smaller subcentimeter masses  Unfortunately due to significant amounts of sedation I am unable to get a good exam   Her pupils are pinpoint but reactive, she has a cough, gag and corneals  There is minimal withdrawal x4  After multiple attempts of calling the son who is POA I was unable to reach him  I did speak to the sister  According to the sister the son as well as the patient's significant other and her had a long discussion yesterday about her overall goals of care  They decided that they would like to proceed with surgery even with the understanding that this may be complicated by significant morbidity including long-term speech difficulties, possible stroke, paralysis, seizure and death  We discussed the risks and benefits of the left frontal temporal craniotomy with possible craniectomy  We discussed the need for likely subsequent radiation therapy and that this is likely an uncurable disease based on its presence within the brain and lung  She expressed understanding and desire to proceed  We will do this in an emergent fashion

## 2023-05-23 NOTE — ASSESSMENT & PLAN NOTE
· POD 0 from L frontotemporal craniotomy for tumor resection Velia Macario 5/23/2023)   · Patient presented with nondescript symptoms  · Family had concern regarding her mental state with altered sensorium and generalized weakness for 1 month    Imaging:   • CT head 5/23/2023: Status post left-sided craniotomy for resection of 2 dominant metastatic lesions with expected postoperative changes  Small foci of hyperintensity within the left brainstem likely sequelae of previous seen uncal herniation  Small left superior cerebellar lacunar infarct new since prior  Grossly stable rightward midline shift with partial entrapment of the L lateral ventricle and transependymal flow  Plan:   · ongoing frequent neurological checks  · Recommend STAT CT head for decline in GCS >2 points in 1 hour  · MRI brain with and without contrast within 24 hours   · Gradually wean sedation towards extubation per [facility]   · Fentanyl 100mcg  · Continue 3% hypertonic saline with hypernatremia goal   · Decadron taper as ordered   · Keppra 500mg BID x7 days unless concern for seizure   · Ancef x24 hours   · JULIUS drain in place- monitor output   · DVT ppx: SCD's only  · Hold all AC/AP medication at this time  · Ongoing medical management and pain control per primary team  · Systolic BP <265 and MAP >48   · Plan for PT/OT/SLT once extubated   · CM following for dispo planning  · Family updated at bedside   · Neurosurgery will continue  Call with questions or concerns

## 2023-05-23 NOTE — OP NOTE
OPERATIVE REPORT  PATIENT NAME: Jesse Torres    :  1970  MRN: [id number]  Pt Location: BE OR ROOM 18    SURGERY DATE: 2023    Surgeon(s) and Role:     * Rachel Bustillo MD - Primary     * Cornelia Knapp PA-C - Assisting    Preop Diagnosis:  Brain mass [G93 89]    Post-Op Diagnosis Codes:     * Brain mass [G93 89]    Procedure(s):  Left - left frontal CRANIOTOMY IMAGE-GUIDED FOR TUMOR    Specimen(s):  ID Type Source Tests Collected by Time Destination   1 : left inferior frontal mass Tissue Brain TISSUE EXAM Rachel Bustillo MD 2023 1022    2 : left inferior frontal mass Tissue Brain TISSUE EXAM Rachel Bustillo MD 2023 1042    3 : left frontal mass Tissue Brain TISSUE EXAM Rachel Bustillo MD 2023 1042        Estimated Blood Loss:   75 mL    Drains:  Closed/Suction Drain Left Head Bulb 10 Fr  (Active)   Dressing Status Clean;Dry; Intact 23 1101   Number of days: 0       NG/OG/Enteral Tube Orogastric 18 Fr Right mouth (Active)   Placement Reverification Auscultation 23 0400   Site Assessment Clean;Dry; Intact 23 0400   Status Suction-low intermittent 23 0600   Drainage Appearance Bile 23 0600   Output (mL) 250 mL 23 0600   Number of days: 1       Urethral Catheter Temperature probe 16 Fr  (Active)   Reasons to continue Urinary Catheter  Accurate I&O assessment in critically ill patients (48 hr  max) 23 0400   Goal for Removal Voiding trial when ambulation improves 23 0400   Site Assessment Clean;Skin intact; Patent 23 0400   Fatima Care Done 23 0400   Collection Container Standard drainage bag 23 0400   Securement Method Securing device (Describe) 23 0400   Output (mL) 250 mL 23 0400   Number of days: 2       Anesthesia Type:   General    Operative Indications:  Brain mass [G93 89]  This is a 23-year-old female with a history hepatitis C who presented to the hospital for confusion and speech difficulties    Imaging demonstrated multiple intracranial masses  Unfortunately over the course of the evening she had a progressive decline in consciousness as well as a new left 3rd nerve palsy  She was given mannitol and intubated  Her 3rd nerve palsy improved and she was stabilized and an MRI was performed  This demonstrated multiple intracranial left hemispheric masses as well as multiple smaller masses  The 2 dominant left hemispheric masses were causing a significant mass effect and edema  Unfortunately predominant mass was near Broca's and arcuate fasciculus  After an extensive conversation with the sister she elected to proceed with surgery with tumor resection understanding the risks including potential speech difficulties, paralysis, seizure and death  Operative Findings:  2 large metastatic lesions resected, frozen pathology consistent with metastatic carcinoma    Complications:   None    Procedure and Technique:  After obtaining written informed consent from family the patient was brought to the operating room  General anesthesia was induced and she was moved to the operating room table with her head turned to the right exposing her left frontal region  Neuro navigation was then registered and confirmed to be accurate  Image guidance from the [employer] was used throughout the duration of the case  Images were loaded into the system and used for preoperative planning as well as intraoperative guidance  It was critically important through the duration of the case for navigation and avoidance of critical structures  A large myocutaneous flap was planned  Her head was then prepped and draped in the usual sterile fashion  A surgical timeout was performed  10 blade was used to open the myocutaneous flap  The temporalis fascia and temporalis was opened with monopolar cautery and reflected anteriorly  The drill was then used to make bur holes at the keyhole, temporal fossa, and frontal bone    Next a large craniotomy was turned with the B1 craniotome  The craniotomy flap was saved on the back table and a bacitracin soaked sponge  Peripheral tack up stitches were placed  Next, the dura was opened with a 15 blade  The dura was reflected anteriorly  Using navigation I was able to localize and identify the deep inferior frontal mass  Going as posterior as possible and superior to the sylvian fissure a small corticectomy was made  I was able to dissect anteriorly and medially and encountered the tumor  I then began to work circumferentially around the tumor  The tumor cyst was evacuated  I then slowly dissected around the tumor capsule removing it in its entirety  A portion of the specimen was sent as frozen pathology and turned consistent with metastatic carcinoma  After full circumferential dissection the remainder of the tumor and tumor capsule were removed and sent for permanent specimen  I then turned my attention to hemostasis  This was done with bipolar cautery and Surgicel  Once satisfied with this I turned to the secondary left frontal mass  Upon inspection at this juncture the brain was found to be sunken without evidence of significant fullness concerning for bone replacement  This mass left clear discoloration along the cortical surface  A small corticectomy was made  Circumferentially I dissected around the tumor and removed it en bloc  This was also saved and sent as a separate permanent specimen in formalin  Once again I turned my attention to hemostasis  Tumor cavity was lined with Surgicel  Next the dura was reapproximated using 4-0 Nurolon's  2 central tack ups were placed  Gelfoam was placed over this and the bone flap was replaced using titanium plating system  A 10 Slovak PVC drain was then placed under the temporalis fascia  The temporalis fascia was then reapproximated using 2-0 Vicryl's  The galea was closed using inverted 2-0 Vicryl's    There is significant difficulty with the quality of her galea and cutaneous layers along the posterior aspect of her incision  Ultimately is able to reapproximate these  Next the skin was closed using running 3-0 Prolene  The drain was secured in place  Sterile dressing and head wrap were applied  Given her critical state we elected to remain intubated and transported the patient to the ICU  There were 2 uninterrupted and correct surgical counts  A surgical signout was performed  Due to the multiple masses and the emergent nature of this case there is greater than 50% increased complexity and time required for its completion  There was no qualified resident aid in this case  As such, due to its complex nature Claudette Schwarz PA-C, was present and assisted for the duration of the case including exposure, tumor resections, and closure  I was present for the entire procedure      Patient Disposition:  Critical Care Unit        SIGNATURE: Xi Molina MD  DATE: May 23, 2023  TIME: 12:16 PM

## 2023-05-23 NOTE — CASE MANAGEMENT
Case Management Assessment & Discharge Planning Note    Patient name Eloisa Martinez  Location ICU 09/ICU 09 MRN [id number]  : 1970 Date 2023       Current Admission Date: 2023  Current Admission Diagnosis:Brain mass   Patient Active Problem List    Diagnosis Date Noted   • Brain mass 2023   • COPD (chronic obstructive pulmonary disease) ([facility] [address] ) 2023   • Encephalopathy 2023   • Alcohol abuse, continuous 2023   • Depression    • Other dysphagia 2019   • Hepatitis C antibody test positive 2019   • Simple chronic bronchitis ([facility] [address] ) 12/10/2018   • Gastroesophageal reflux disease with hiatal hernia 2017   • Asthma 10/25/2017   • Uncontrolled moderate persistent asthma 10/25/2017      LOS (days): 2  Geometric Mean LOS (GMLOS) (days):   Days to GMLOS:     OBJECTIVE:    Risk of Unplanned Readmission Score: 12 73         Current admission status: Inpatient       Preferred Pharmacy:   [facility]/pharmacy #[id number] [address]  Phone: [phone] Fax: [phone]    Primary Care Provider: Tori Arias MD    Primary Insurance: Fahad Vargas  Secondary Insurance:     ASSESSMENT:  [address] [place] [address]   Primary Phone: [phone] (Mobile)               Patient Information  Admitted from[de-identified] Home  Mental Status: Intubated  During Assessment patient was accompanied by: Not accompanied during assessment  Assessment information provided by[de-identified] Sister  Primary Caregiver: Self  Support Systems: Family members, Spouse/significant other  What city do you live in[place] entry access options   Select all that apply : Stairs  Number of steps to enter home : 2  Type of Current Residence: [facility]  In the last 12 months, was there a time when you were not able to pay the mortgage or rent on time?: No  In the last 12 months, how many places have you lived?: 1  In the last 12 months, was there a time when you did not have a steady place to sleep or slept in a shelter (including now)?: No  Homeless/housing insecurity resource given?: N/A  Living Arrangements: Lives w/ Spouse/significant other  Is patient a ?: No    Activities of Daily Living Prior to Admission  Functional Status: Independent  Completes ADLs independently?: Yes  Ambulates independently?: Yes  Does patient use assisted devices?: No  Does patient currently own DME?: No  Does patient have a history of Outpatient Therapy (PT/OT)?: No  Does the patient have a history of Short-Term Rehab?: No  Does patient have a history of HHC?: No  Does patient currently have [employer]?: No    Patient Information Continued  Does patient have prescription coverage?: Yes  Within the past 12 months, you worried that your food would run out before you got the money to buy more : Never true  Within the past 12 months, the food you bought just didn't last and you didn't have money to get more : Never true  Food insecurity resource given?: N/A  Does patient receive dialysis treatments?: No  Does patient have a history of substance abuse?: Yes (Sister states patient drinks 6 beers/ daily)  Historical substance use preference: Alcohol/ETOH, Marijuana    Means of Transportation  Means of Transport to Appts[de-identified] Family transport  In the past 12 months, has lack of transportation kept you from medical appointments or from getting medications?: No  In the past 12 months, has lack of transportation kept you from meetings, work, or from getting things needed for daily living?: No  Was application for public transport provided?: N/A        DISCHARGE DETAILS:    Discharge planning discussed with[de-identified] Patient's sister  Freedom of Choice: Yes  Comments - Freedom of Choice: Discussed FOC      CM reviewed d/c planning process including the following: identifying help at home, patient preference for d/c planning needs, Discharge [place], [facility] Meds to Bed program, availability of treatment team to discuss questions or concerns patient and/or family may have regarding understanding medications and recognizing signs and symptoms once discharged  CM also encouraged patient to follow up with all recommended appointments after discharge  Patient advised of importance for patient and family to participate in managing patient’s medical well being  Information obtained from patient's sister, as patient is in the OR  Patient lives with S/O in trailer home, couple DEON, does not drive, was IADLS according to sister    Sister states patient drinks 6 beers daily- unable to speak with patient regarding ETOH

## 2023-05-23 NOTE — PROCEDURES
POC Ocular US    Date/Time: 5/23/2023 12:32 AM  Performed by: Rian Antonio DO  Authorized by: Rian Antonio DO     Patient location:  ICU  Performed by: Attending  Procedure details:     Exam Type:  Diagnostic    Indications: evaluation for increased intracranial pressure      Assessment for: optic nerve sheath diameter      Eye(s) assessed: Both eyes    Structures visualized: anterior chamber, posterior chamber, lens and optic nerve      Image quality: diagnostic      Image availability:  Images available in PACS  Left eye findings:     Left optic nerve sheath diameter: enlarged (greater than 5 mm)    Right eye findings:     Right optic nerve sheath diameter: enlarged (greater than 5 mm)    Interpretation:     Ocular US impressions: abnormal (see above)    Comments:      Optic nerve sheath diameter greater than 5 mm bilaterally concerning for elevated ICP  Procedures not include critical care time

## 2023-05-23 NOTE — PROGRESS NOTES
[facility]  Progress Note  Name: Xavier High  MRN: [id number]  Unit/Bed#: ICU 09 I Date of Admission: 5/21/2023   Date of Service: 5/23/2023 I Hospital Day: 2    Assessment/Plan   * Brain mass  Assessment & Plan  · POD 0 from L frontotemporal craniotomy for tumor resection Cheng Antonio 5/23/2023)   · Patient presented with nondescript symptoms  · Family had concern regarding her mental state with altered sensorium and generalized weakness for 1 month    Imaging:   • CT head 5/23/2023: Status post left-sided craniotomy for resection of 2 dominant metastatic lesions with expected postoperative changes  Small foci of hyperintensity within the left brainstem likely sequelae of previous seen uncal herniation  Small left superior cerebellar lacunar infarct new since prior  Grossly stable rightward midline shift with partial entrapment of the L lateral ventricle and transependymal flow  Plan:   · ongoing frequent neurological checks  · Recommend STAT CT head for decline in GCS >2 points in 1 hour  · MRI brain with and without contrast within 24 hours   · Gradually wean sedation towards extubation per [facility]   · Fentanyl 100mcg  · Continue 3% hypertonic saline with hypernatremia goal   · Decadron taper as ordered   · Keppra 500mg BID x7 days unless concern for seizure   · Ancef x24 hours   · JULIUS drain in place- monitor output   · DVT ppx: SCD's only  · Hold all AC/AP medication at this time  · Ongoing medical management and pain control per primary team  · Systolic BP <173 and MAP >09   · Plan for PT/OT/SLT once extubated   · CM following for dispo planning  · Family updated at bedside   · Neurosurgery will continue  Call with questions or concerns  Subjective/Objective   Chief Complaint: None     Subjective: Patient remains intubated on sedation   Family updated at bedside     Objective: laying in bed    I/O       05/21 0701 05/22 0700 05/22 0701 05/23 0700 05/23 "0701  05/24 0700    I V  (mL/kg)  4446 (108 7) 1600 (39 2)    IV Piggyback  1700     Total Intake(mL/kg)  6146 (150 3) 1600 (39 2)    Urine (mL/kg/hr) 500 2660 (2 7) 3325 (8 6)    Emesis/NG output  250     Blood   75    Total Output 500 2910 3400    Net -500 +3236 -1800                 Invasive Devices     Central Venous Catheter Line  Duration           CVC Central Lines 05/22/23 Triple 16cm <1 day          Peripheral Intravenous Line  Duration           Peripheral IV 05/22/23 Left Forearm 1 day    Peripheral IV 05/22/23 Right Antecubital 1 day          Arterial Line  Duration           Arterial Line 05/22/23 Radial <1 day          Drain  Duration           Urethral Catheter Temperature probe 16 Fr  1 day    Closed/Suction Drain Left Head Bulb 10 Fr  <1 day    NG/OG/Enteral Tube Orogastric 18 Fr Right mouth <1 day          Airway  Duration           ETT  Cuffed <1 day                Physical Exam:  Vitals: Blood pressure 132/68, pulse (!) 40, temperature (!) 97 2 °F (36 2 °C), resp  rate 20, height 4' 11\" (1 499 m), weight 40 8 kg (90 lb), last menstrual period 06/02/2019, SpO2 100 %  ,Body mass index is 18 18 kg/m²  Hemodynamic Monitoring: MAP: Arterial Line MAP (mmHg): 102 mmHg     General appearance: remains intubated on sedation at this time  Head: Normocephalic  Head wrap and JULIUS drain in place   Eyes: pupils 2mm and non reactive bilaterally- likely secondary to medication   Neck: supple, symmetrical, trachea midline  Back: no kyphosis present  Lungs: non labored breathing  Intubated   Heart: regular heart rate  Neurological: Depressed at this time secondary to sedation  GCS 3T  No sedation break at this time  Pupils small and symmetric  Non labored breathing       Lab Results:  Results from last 7 days   Lab Units 05/23/23  0614 05/21/23  1705   WBC Thousand/uL 19 38* 8 28   HEMOGLOBIN g/dL 12 4 15 4   HEMATOCRIT % 35 1 44 9   PLATELETS Thousands/uL 247 232   NEUTROS PCT %  --  78*   MONOS PCT %  --  12 " MONO PCT % 3*  --      Results from last 7 days   Lab Units 05/23/23  1337 05/23/23  0614 05/23/23  0016 05/22/23  1526 05/21/23  1705   POTASSIUM mmol/L 2 9* 3 8 3 4*   < > 3 5   CHLORIDE mmol/L 124* 128* 121*   < > 102   CO2 mmol/L 25 17* 16*   < > 21   BUN mg/dL 7 7 9   < > 25   CREATININE mg/dL 0 60 0 48* 0 54*   < > 0 56*   CALCIUM mg/dL 7 8* 8 6 7 6*   < > 9 8   ALK PHOS U/L  --   --   --   --  66   ALT U/L  --   --   --   --  9   AST U/L  --   --   --   --  22    < > = values in this interval not displayed  Results from last 7 days   Lab Units 05/23/23  0614 05/22/23  1526 05/21/23  1705   MAGNESIUM mg/dL 2 2 1 6 1 3*     Results from last 7 days   Lab Units 05/23/23  0614 05/22/23  1526 05/21/23  1705   PHOSPHORUS mg/dL 2 8 1 8* 3 1     Results from last 7 days   Lab Units 05/21/23  1705   INR  1 16   PTT seconds 31     No results found for: TROPONINT  ABG:  Lab Results   Component Value Date    PHART 7 412 05/23/2023    VGR3XWO 36 6 05/23/2023    PO2ART 145 1 (H) 05/23/2023    TNK6GCD 22 8 05/23/2023    BEART -1 4 05/23/2023    SOURCE Line, Arterial 05/23/2023       Imaging Studies: I have personally reviewed pertinent reports  and I have personally reviewed pertinent films in PACS  XR chest portable    Result Date: 5/23/2023  Impression: Background emphysema  Known left lower lobe mass overlies the left hilum  No pneumothorax after right IJ central venous catheter placement  Workstation performed: [id number]     XR chest 1 view portable    Result Date: 5/22/2023  Impression: Right middle lobe opacity may represent pneumonia or atelectasis  Left hilar mass  The study was marked in [facility] for immediate notification    Workstation performed: [id number]     XR hip/pelv 2-3 vws right    Result Date: 5/22/2023  Impression: No acute osseous abnormality   Workstation performed: [id number]     CT head wo contrast    Result Date: 5/23/2023  Impression: Status post left-sided craniotomy for the resection of 2 dominant metastatic lesions with expected postoperative hemorrhage and pneumocephalus in the operative bed  Small foci of Duret type hemorrhage within the left brainstem likely sequela of previously seen uncal herniation, which has improved  Findings appear new since prior CT and grossly stable since recent MRI  Note is also made of a new left superior cerebellar lacunar infarct, new since prior exams  Grossly stable rightward midline shift with partial entrapment of the right lateral ventricle and transependymal flow of CSF  The study was marked in [facility] for immediate notification  Workstation performed: [id number]     CT head wo contrast    Result Date: 5/22/2023  Impression: Metastatic disease with multiple intracranial lesions with surrounding vasogenic edema with subfalcine herniation, uncal herniation and left-to-right shift of about 1 5 cm with stable midline shift and mass effect High attenuation seen within the left insular cistern, unchanged due to perilesional hemorrhage or leptomeningeal disease or sulcal/extra-axial hemorrhage, stable I personally discussed this study with LYRIC AMARO on 5/22/2023 5:04 PM  Workstation performed: [id number]     CT head without contrast    Result Date: 5/21/2023  Impression: Multiple (at least 4) intracranial mass lesions as above including dominant left frontal/basal ganglia lesions with significant associated vasogenic edema in the left greater than right hemispheres highly concerning for metastases  Significant associated  mass effect including 1 cm rightward subfalcine herniation  Nonspecific marginal hyperattenuation suspicious for small amount of perilesional hemorrhage  Associated ventricular entrapment/obstructive hydrocephalus  Urgent neurosurgery consult and follow-up gadolinium-enhanced MRI of the brain advised  Above findings discussed with Dr Cynthia Caal at 6:30 p m  on 5/21/2023   Workstation performed: [id number]     MRI brain w wo contrast    Result Date: 5/23/2023  Impression: Multiple enhancing lesions identified within the brain parenchyma consistent with metastatic disease  The largest lesions are seen within the left hemisphere involving the insular region and anterior left frontal lobe with irregular peripheral enhancement and extensive surrounding vasogenic edema  Multiple additional smaller lesions are scattered within the brain parenchyma  The edema and mass effect results in approximately 8 mm of left to right shift with partial effacement of the suprasellar cistern  Workstation performed: [id number]     CT chest abdomen pelvis w contrast    Result Date: 5/22/2023  Impression: 3 x 2 8 x 2 6 cm mass superior segment of the left lower lobe highly suspicious for primary lung cancer  Additional scattered bilateral subcentimeter nodules are indeterminate  Upper lobe predominant emphysema  No metastatic disease in the abdomen or pelvis  The study was marked in [facility] for immediate notification  Workstation performed: [id number]       EKG, Pathology, and Other Studies: I have personally reviewed pertinent reports        VTE Pharmacologic Prophylaxis: Reason for no pharmacologic prophylaxis s/p craniotomy     VTE Mechanical Prophylaxis: sequential compression device

## 2023-05-23 NOTE — RESPIRATORY THERAPY NOTE
RT Ventilator Management Note  Randall Reveles 47 y o  female MRN: [id number]  Unit/Bed#: ICU 09 Encounter: [id number]      Daily Screen         5/22/2023  5631             Patient safety screen outcome[de-identified] Failed    Not Ready for Weaning due to[de-identified] --              Physical Exam:   Assessment Type: Assess only  General Appearance: Sedated  Respiratory Pattern: (P) Assisted  Chest Assessment: (P) Chest expansion symmetrical  Bilateral Breath Sounds: (P) Diminished, Clear      Resp Comments: pt taken to mri on transport vent  Brought back up on vent  No distress noted

## 2023-05-23 NOTE — PLAN OF CARE
Problem: PAIN - ADULT  Goal: Verbalizes/displays adequate comfort level or baseline comfort level  Description: Interventions:  - Encourage patient to monitor pain and request assistance  - Assess pain using appropriate pain scale  - Administer analgesics based on type and severity of pain and evaluate response  - Implement non-pharmacological measures as appropriate and evaluate response  - Consider cultural and social influences on pain and pain management  - Notify physician/advanced practitioner if interventions unsuccessful or patient reports new pain  Outcome: Progressing     Problem: INFECTION - ADULT  Goal: Absence or prevention of progression during hospitalization  Description: INTERVENTIONS:  - Assess and monitor for signs and symptoms of infection  - Monitor lab/diagnostic results  - Monitor all insertion sites, i e  indwelling lines, tubes, and drains  - Monitor endotracheal if appropriate and nasal secretions for changes in amount and color  - Webster appropriate cooling/warming therapies per order  - Administer medications as ordered  - Instruct and encourage patient and family to use good hand hygiene technique  - Identify and instruct in appropriate isolation precautions for identified infection/condition  Outcome: Progressing  Goal: Absence of fever/infection during neutropenic period  Description: INTERVENTIONS:  - Monitor WBC    Outcome: Progressing     Problem: Knowledge Deficit  Goal: Patient/family/caregiver demonstrates understanding of disease process, treatment plan, medications, and discharge instructions  Description: Complete learning assessment and assess knowledge base    Interventions:  - Provide teaching at level of understanding  - Provide teaching via preferred learning methods  Outcome: Progressing     Problem: MOBILITY - ADULT  Goal: Maintain or return to baseline ADL function  Description: INTERVENTIONS:  -  Assess patient's ability to carry out ADLs; assess patient's baseline for ADL function and identify physical deficits which impact ability to perform ADLs (bathing, care of mouth/teeth, toileting, grooming, dressing, etc )  - Assess/evaluate cause of self-care deficits   - Assess range of motion  - Assess patient's mobility; develop plan if impaired  - Assess patient's need for assistive devices and provide as appropriate  - Encourage maximum independence but intervene and supervise when necessary  - Involve family in performance of ADLs  - Assess for home care needs following discharge   - Consider OT consult to assist with ADL evaluation and planning for discharge  - Provide patient education as appropriate  Outcome: Progressing

## 2023-05-23 NOTE — MALNUTRITION/BMI
This medical record reflects one or more clinical indicators suggestive of malnutrition and/or morbid obesity  Malnutrition Findings:                            360 Statement: PT is underweight with BMI at 18 2, will continue to monitor for malnutrition at this time limited data,unable to physically assess and obtain diet hx, presume decreased po d/t confusion past month, weight has been stable past 8 months at 90lbs, 9/2/23 90lbs, 11/16/22 90lbs, 12/1/22 90lbs, 4/24/23 90lbs, 5/23/23 90lbs  Speech therapy following 5/22 recommended NPO  Currently intubated, plans for surgery  PT will most likely require nutrition support  PT is intubated, on propofol at 12  2mL/hr, provides 322cal  Once able to initiate TF recommend [email or web address], add 3 packs of prosource, water flushes 120mL every 4hrs provides with current propofol total of 1222cal, 78g pro, 1204mL  Will continue to monitor care of plan, labs, weight, changes with propofol and adjust TF recommendations accordingly  BMI Findings:  Adult BMI Classifications: Underweight < 18 5        Body mass index is 18 21 kg/m²  See Nutrition note dated 5/23/23 for additional details  Completed nutrition assessment is viewable in the nutrition documentation

## 2023-05-23 NOTE — TELEPHONE ENCOUNTER
Saba Pena called asking to leave a message for Dr Shar Donato  She said that she thinks he would want to know that Jl Dominguez has been in the hospital since 5/21   She said that Dr Shar Donato can call her if he has any questions or anything like that      [phone]

## 2023-05-23 NOTE — ANESTHESIA POSTPROCEDURE EVALUATION
Post-Op Assessment Note    CV Status:  Stable    Pain management: adequate     Mental Status:  Sleepy (Pt sedated)   Hydration Status:  Stable   PONV Controlled:  Controlled   Airway Patency:  Patent  Airway: intubated      Post Op Vitals Reviewed: Yes      Staff: Anesthesiologist, CRNA         No notable events documented      BP  140/75    Temp     Pulse 68   Resp   18   SpO2   100

## 2023-05-23 NOTE — PROGRESS NOTES
Patient's mental status got worse and transferred to ICU  Unfortunately received 2mg versed to facilitate a CT head on way to ICU  Exam largely skewed on arrival with low GCS  No EO, not FC, localizes uppers  Hypertensive and intermittently bradycardic  After CVC insertion- we noticed that her pupils were now more dilated and unreactive  L- 6mm R-5mm   No response to noxious stimuli  I gave 75g mannitol  We urgently intubated her and will place on propofol/fentanyl  We gave a hypertonic saline bolus and started on infusion with goal Na 145-155 and goal osmolarity 310-320  Continue high dose decadron  Hyperventilate to pCO2- 30-35  Keep HOB up >30 degrees    After a short while- pupillary size reduced to 3mm but still unreactive  Getting last 25g mannitol  Will reassess    D/w neurosurgery Dr Shima Cordova- initially after intubation and subsequently on review of pupillary size  If pupils dilate again- will need emergent craniectomy  Will hold on getting MRI brain for now given she will have to lay down for MRI   Will plan to do when things have settled down somewhat and Na is nearer 145      Critical care time-55mins

## 2023-05-23 NOTE — PROGRESS NOTES
[address] [facility]  Progress Note: Critical Care  Name: Randall Reveles 47 y o  female I MRN: [id number]  Unit/Bed#: ICU 09 I Date of Admission: 5/21/2023   Date of Service: 5/23/2023 I Hospital Day: 2    Assessment/Plan   Neuro:   · Diagnosis: Brain metastases  · 1 month hx of waxing/waning weakness  · 1 week of worsening confusion leading to admission  · 5/21 CTH- Multiple (at least 4) intracranial mass lesions as above including dominant left frontal/basal ganglia lesions with significant associated vasogenic edema in the left greater than right hemispheres highly concerning for metastases  Significant associated mass effect including 1 cm rightward subfalcine herniation  Nonspecific marginal hyperattenuation suspicious for small amount of perilesional hemorrhage  Associated ventricular entrapment/obstructive hydrocephalus  · 5/21 CT chest, abd, pelvis-  x 2 8 x 2 6 cm mass superior segment of the left lower lobe highly suspicious for primary lung cancer  Additional scattered bilateral subcentimeter nodules are indeterminate  Upper lobe predominant emphysema  No metastatic disease in the abdomen or pelvis  · Received Decadron 4 mg q6hr  · Started on Keppra 500 mg BID   · Mental status and neuro exam worsened on 5/22--> Suspicion for worsening herniation/increased ICP--> Required CVC and intubation  · Received 75 g mannitol, HTS 30 ml/hr  · 5/22 POCUS optic nerve sheath--> Both optic nerve sheaths > 5 mm concerning for increased ICP  · 5/23 MRI brain w/wo contrast- Multiple enhancing lesions identified within the brain parenchyma consistent with metastatic disease  The largest lesions are seen within the left hemisphere involving the insular region and anterior left frontal lobe with irregular peripheral enhancement and extensive surrounding vasogenic edema  Multiple additional smaller lesions are scattered within the brain parenchyma   The edema and mass effect results in approximately 8 mm of left to right shift with partial effacement of the suprasellar cistern    · 5/23 Osmolar gap 15  · Plan:   · F/u on 5/23 MRI read  · Neurosurgery intervention expected today  · Continue decadron 6 mg q6hrs  · Continue Keppra 500 mg q12hrs  · Head of bed elevation >30 degrees  · Continue sedation  · Continue hypertonic saline, monitor q6 BMP and osmolality  · Maintain SBP < 180, MAP > 65  · PRN IV hydralazine 10 mg, titrate Levophed  · Maintain euthermia  · Diagnosis: Sedation  · After intubation on 5/22, started on fentanyl 100 mcg/hr and 50 mcg/kg/min propofol  · Patient becomes hypertensive/tachycardic when sedation off  · Plan:   · Continue fentanyl and propofol at current doses to decrease ventilator dyssynchrony and to decrease ICP  · Diagnosis: Alcohol abuse  · Drinks 6 beers/day  · Decreased alcohol intake (1 beer/day) over past week  · On CIWA-> Maximum of 6 on 5/22  · Currently intubated and mechanically ventilated   · Plan:   · Hold thiamine and folate for now (NPO)  · Continue Keppra 500 mg BID for seizure prevention  · Diagnosis: Depression  · On Paxil 10 mg daily at home  · Plan:   · Hold for NPO  · Diagnosis: Tobacco use  · 40 pack-years  · Plan:   · Continue nicoderm patch    CV:   · Diagnosis: No active issues      Pulm:  · Diagnosis: Mechanical ventilation  · On ACVC 350/20/60/6 from 350/24/60/6  · Saturating well %, hyperchloremic non-gap metabolic acidosis on VBG   · ETT 1 cm from zach on 5/22 CXR  · Plan:  · Decrease FiO2 as tolerated, maintain SpO2 >88%  · Increase respiratory rate to decrease ICP and compensate   · Pull back ETT for height 2-3 cm above zach  · Diagnosis: COPD   · Trelegy Ellipta 100, 1 inhalation once a day  · Albuterol rescue inhaler 2 inhalations 4x/day PRN  · Fluticasone nasal spray 2 sprays to each nostril QD  · Astelin nasal spray 2 sprays to each nostril BID  · Plan:   · Fluticasone furoate- Vilanterol 1 puff QD  · Umeclidinium 1 puff/day  · Albuterol neb PRN      GI:   · Diagnosis: GERD  · Plan:   · Continue IV protonix 40 mg      :   · Diagnosis: No active issues      F/E/N:   · Fluids: NS 75ml/hr, 3% NaCl 30 ml/hr  · Electrolytes: Potassium (3 0->3 8), phosphate (1 8->2 8), Mg2+ (1 6->2 2) replaced  · Plan: Continue to replace with goal Mg >2, Phos >3, K+ > 4  · Nutrition: NPO, resume enteral feeding once deemed ready by speech pathology      Heme/Onc:   · Diagnosis: Lung mass  · 5/21 CT chest, abd, pelvis-  3x 2 8 x 2 6 cm mass superior segment of the left lower lobe highly suspicious for primary lung cancer  Additional scattered bilateral subcentimeter nodules are indeterminate  Upper lobe predominant emphysema  No metastatic disease in the abdomen or pelvis  · Multiple metastases to brain  · Plan:   · NSGY following, possible biopsy of metastases if intervention recommended/family in agreement      Endo:   · Diagnosis: No active issues  · Glucose 143-198  · Likely from decadron  · Started on algorithm 1  · Plan:   · Continue to monitor glucose  · Maintain euglycemia 140-180  · Continue algorithm 1    ID:   · Diagnosis: Hepatitis C   · Untreated  · LFTs within normal range  · Plan:   · Hep C precautions  · Preliminary blood cultures negative    MSK/Skin:   · Diagnosis: Right hip pain  · Per patient's family, patient has been having right hip pain for the past year  · Seen by pain medicine, patient declined steroid injections because afraid of needles  · 5/21- XR hip/pelvis right- No lytic/blastic osseous lesions, no acute osseous abnormality  · Plan:   · Continue to monitor  LDA  · R radial arterial line, RIJ CVC, sanchez catheter     Disposition: Critical care    ICU Core Measures     Vented Patient  VAP Bundle  VAP bundle ordered     A: Assess, Prevent, and Manage Pain · Has pain been assessed? Yes  · Need for changes to pain regimen?  No   B: Both Spontaneous Awakening Trials (SATs) and Spontaneous Breathing Trials (SBTs) · Plan to perform spontaneous awakening trial today? No secondary to elevated ICP/Craniectomy without helmet   · Plan to perform spontaneous breathing trial today? No secondary to elevated ICP/Craniectomy without helmet   · Obvious barriers to extubation? Yes   C: Choice of Sedation · RASS Goal: -5 Unarousable or 0 Alert and Calm  · Need for changes to sedation or analgesia regimen? No   D: Delirium · CAM-ICU: Unable to perform secondary to Acute cognitive dysfunction   E: Early Mobility  · Plan for early mobility? No   F: Family Engagement · Plan for family engagement today? Yes       Review of Invasive Devices: [place] Plan: Continue for accurate I/O monitoring for 48 hours  Central access plan: Medications requiring central line  Chastity Plan: Keep arterial line for hemodynamic monitoring, frequent ABGs and frequent labs    Prophylaxis:  VTE Contraindicated secondary to: Multiple brain metastases   Stress Ulcer  Pantoprazole (PROTONIX) injection 40 mg [[id number]]          Subjective   HPI/24hr events: Patient became less responsive yesterday evening, necessitating the administration of hypertonic saline via CVC  After CVC insertion, patient was noted to have hypertension bradycardia as well as unequal pupils (L>R) non-reactive to light concerning for herniation  As such, patient was intubated for airway protection and given sedation and mannitol to reduce ICP  Patient also required Levophed via CVC     Review of Systems   Unable to perform ROS: Intubated        Objective                            Vitals I/O      Most Recent Min/Max in 24hrs   Temp 98 1 °F (36 7 °C) Temp  Min: 94 6 °F (34 8 °C)  Max: 99 3 °F (37 4 °C)   Pulse (!) 50 Pulse  Min: 48  Max: 142   Resp 20 Resp  Min: 16  Max: 38   /70 BP  Min: 70/47  Max: 193/90   O2 Sat 100 % SpO2  Min: 95 %  Max: 100 %      Intake/Output Summary (Last 24 hours) at 5/23/2023 0541  Last data filed at 5/23/2023 0400  Gross per 24 hour   Intake 5273 1 ml   Output 2660 ml   Net 2613 1 ml Diet NPO     Invasive Monitoring Physical exam   Arterial Line  Chastity BP 96/80  Arterial Line BP  Min: 88/78  Max: 138/64   MAP 88 mmHg  Arterial Line MAP (mmHg)  Min: 78 mmHg  Max: 104 mmHg    Physical Exam  Constitutional:       Comments: Appears chronically ill, malnourished     HENT:      Head: Normocephalic  Mouth/Throat:      Mouth: Mucous membranes are moist    Eyes:      General: No scleral icterus  Conjunctiva/sclera: Conjunctivae normal       Comments: Pupils 2-3 mm bilaterally, left pupil unreactive to light, right pupil constricts    Cardiovascular:      Rate and Rhythm: Regular rhythm  Bradycardia present  Heart sounds: Normal heart sounds  No murmur heard  Pulmonary:      Breath sounds: Normal breath sounds  No wheezing or rales  Comments: Mechanically ventilated    Abdominal:      General: Abdomen is flat  Bowel sounds are normal  There is no distension  Palpations: Abdomen is soft  Musculoskeletal:      Right lower leg: No edema  Left lower leg: No edema  Skin:     General: Skin is warm and dry  Capillary Refill: Capillary refill takes less than 2 seconds  Neurological:      Comments: Limited by IV sedation and intubation  No corneal, cough, or gag reflex  Does not withdraw to pain  No abnormal posturing  No clonus  Diagnostic Studies      EK/21- Sinus rhythm with short [place], otherwise normal ECG  Imagin/22- XR chest portable- ETT 1 cm from zach, CVC appears properly positioned  - MRI brain w/wo contrast - L->R midline shift, compression of left lateral ventricle, possible subfalcine herniation (wet read)  I have personally reviewed pertinent reports         Medications:  Scheduled PRN   calcium gluconate, 1 g, Once  chlorhexidine, 15 mL, Q12H NTAIVIDAD  dexamethasone, 6 mg, Q6H NATIVIDAD  famotidine, 40 mg, Daily  Fluticasone Furoate-Vilanterol, 1 puff, Daily   And  umeclidinium, 1 puff, Daily  folic acid, 1 mg, Daily  gabapentin, 100 mg, HS  hydrALAZINE, 10 mg, Once  insulin lispro, 1-5 Units, Q6H [address]  levETIRAcetam, 500 mg, Q12H NATIVIDAD  mannitol, ,   mannitol, ,   midazolam, 2 mg, Once  multivitamin-minerals, 1 tablet, Daily  niCARdipine, ,   nicotine, 7 mg, Daily  pantoprazole, 40 mg, Q24H [address]  thiamine, 100 mg, Daily      acetaminophen, 325 mg, Q4H PRN  albuterol, 1 puff, Q4H PRN  albuterol, 2 5 mg, Q4H PRN  fentanyl citrate (PF), 50 mcg, Q1H PRN  hydrALAZINE, 10 mg, Q4H PRN       Continuous    fentaNYL, 100 mcg/hr, Last Rate: 100 mcg/hr (05/23/23 0211)  norepinephrine, 1-30 mcg/min, Last Rate: 12 mcg/min (05/23/23 0007)  propofol, 5-50 mcg/kg/min, Last Rate: 50 mcg/kg/min (05/23/23 0010)  sodium chloride, 30 mL/hr, Last Rate: 30 mL/hr (05/22/23 1840)  sodium chloride, 75 mL/hr, Last Rate: 75 mL/hr (05/22/23 2032)         Labs:    CBC    Recent Labs     05/21/23  1705   WBC 8 28   HGB 15 4   HCT 44 9        BMP    Recent Labs     05/22/23 2020 05/23/23  0016   SODIUM 138 145   K 3 4* 3 4*   * 121*   CO2 16* 16*   AGAP 10 8   BUN 10 9   CREATININE 0 50* 0 54*   CALCIUM 7 9* 7 6*       Coags    Recent Labs     05/21/23  1705   INR 1 16   PTT 31        Additional Electrolytes  Recent Labs     05/21/23  1705 05/22/23  1526   MG 1 3* 1 6   PHOS 3 1 1 8*          Blood Gas    No recent results  Recent Labs     05/23/23  0016   PHVEN 7 277*   ACB1ZTD 33 7*   PO2VEN 51 8*   XQY7TIM 15 4*   BEVEN -10 3    LFTs  Recent Labs     05/21/23  1705   ALT 9   AST 22   ALKPHOS 66   ALB 4 3   TBILI 0 54       Infectious  No recent results  Glucose  Recent Labs     05/21/23  1705 05/22/23  1526 05/22/23 2020 05/23/23  0016   GLUC 84 143* 172* 198*               Critical Care Time Delivered: Upon my evaluation, this patient had a high probability of imminent or life-threatening deterioration due to cerebral metastases and herniation, which required my direct attention, intervention, and personal management    I have personally provided 30 minutes of critical care time, exclusive of procedures, teaching, family meetings, and any prior time recorded by providers other than myself       Zonia Aguirre

## 2023-05-23 NOTE — PROGRESS NOTES
Patient was reported to have poor exam with decompensation at change of shift  She required endotracheal intubation was reported to have left pupil greater than right  Patient was administered mannitol due to concern of worsening neurologic exam   Plan was to obtain MRI but patient has not been stable to tolerate laying flat  When patient was evaluated tonight she had been initiated on fentanyl as well as propofol due to ventilator dyssynchrony  Subsequently she also required initiation of norepinephrine infusion which is presently being run at 12 mcg/min  Off sedation patient appeared to have dyssynchrony with ventilator with strong cough, corneals were noted to be intact, pupils were small did not appear to be reactive with light on observation, pupils were equal 2 mm bilaterally, endotracheal tube in place, patient had rigidity and extension of left upper extremity and appeared to have purposeful movement of right upper extremity, with withdrawal appreciated on left lower extremity more apparent and withdrawal on right lower extremity, patient did not open eyes and did not follow commands  Off sedation patient did have an improvement in her hemodynamic status  Initial ABG resulted with a pH of 7 296 and a PCO2 of 28  Repeat ABG with a pH of 7 277 and a pH of 33 7 and base deficit of -10 3  Patient's bicarb on her BMP is 16 with an elevated chloride of 121  Patient does not appear to have an anion gap likely patient's metabolic acidosis is secondary to hyperchloremic effect  Continue to trend patient's laboratory data  Replete potassium and calcium  Recheck phosphorus and magnesium in the a m  as both levels were low this afternoon  Bedside point-of-care ultrasound performed to evaluate optic nerve sheath diameter  Both optic nerve sheaths greater than 5 mm concerning for increased intracranial pressure  Continue with Decadron  Patient's serum osmolarity is therapeutic greater than 320  Continue with head of bed elevated, sedation and ventilatory support  MRI was obtained tonight, final read is pending  Patient has multiple metastatic lesions with significant amount of cerebral edema with mass effect  Plan to discuss with neurosurgery this morning interventions  Critical care time does not include procedures or family update  Critical care time 48 minutes

## 2023-05-23 NOTE — ANESTHESIA PREPROCEDURE EVALUATION
Procedure:  left frontal CRANIOTOMY IMAGE-GUIDED FOR TUMOR (Left: Head)    Relevant Problems   GI/HEPATIC   (+) Gastroesophageal reflux disease with hiatal hernia   (+) Other dysphagia      MUSCULOSKELETAL   (+) Gastroesophageal reflux disease with hiatal hernia      NEURO/PSYCH   (+) Depression      PULMONARY   (+) Asthma   (+) COPD (chronic obstructive pulmonary disease) (HCC)   (+) Simple chronic bronchitis (HCC)   on levo and epi  ETT, ariella,      Allergies   Allergen Reactions   • No Active Allergies      Social History     Tobacco Use   • Smoking status: Former     Packs/day: 1 50     Years: 15 00     Pack years: 22 50     Types: Cigarettes     Start date: 5     Quit date: 2018     Years since quittin 4   • Smokeless tobacco: Never   • Tobacco comments:     Stop smoking a month ago /Dec  2018   Vaping Use   • Vaping Use: Never used   Substance Use Topics   • Alcohol use: Yes     Alcohol/week: 6 0 standard drinks     Types: 6 Cans of beer per week     Comment: occasional   • Drug use: Not Currently     Current Outpatient Medications   Medication Instructions   • albuterol (PROVENTIL HFA,VENTOLIN HFA) 90 mcg/act inhaler INHALE 2 PUFFS EVERY 6 HOURS AS NEEDED FOR WHEEZING   • albuterol 2 5 mg, Nebulization, 3 times daily   • Azelastine HCl 137 MCG/SPRAY SOLN 2 SPRAYS INTO EACH NOSTRIL DAILY USE IN EACH NOSTRIL AS DIRECTED   • famotidine (PEPCID) 40 MG tablet TAKE 1 TABLET BY MOUTH EVERY DAY   • fluticasone (FLONASE) 50 mcg/act nasal spray 2 sprays, Nasal, Daily   • fluticasone-umeclidinium-vilanterol (Trelegy Ellipta) 100-62 5-25 mcg/actuation inhaler 1 puff, Inhalation, Daily, Rinse mouth after use     • folic acid (FOLVITE) 1 mg tablet TAKE 1 TABLET BY MOUTH EVERY DAY   • gabapentin (NEURONTIN) 100 mg capsule TAKE 1 CAPSULE BY MOUTH EVERYDAY AT BEDTIME   • Ibuprofen (ADVIL PO) Oral, Pt takes three Advil daily   • meloxicam (MOBIC) 15 mg, Oral, Daily   • methylPREDNISolone 4 MG tablet therapy pack Use as directed on package   • metoprolol succinate (TOPROL-XL) 25 mg, Oral, Daily   • naproxen (NAPROSYN) 500 mg tablet TAKE 1 TABLET BY MOUTH DAILY AS NEEDED FOR MILD PAIN   • omeprazole (PriLOSEC) 40 MG capsule TAKE 1 CAPSULE BY MOUTH EVERY DAY   • PARoxetine (PAXIL) 10 mg tablet TAKE 1 TABLET BY MOUTH EVERY DAY   • thiamine (CVS B-1) 100 mg, Oral, Daily     Lab Results   Component Value Date    WBC 19 38 (H) 05/23/2023    HGB 12 4 05/23/2023    HCT 35 1 05/23/2023     05/23/2023    SODIUM 149 (H) 05/23/2023    K 3 8 05/23/2023     (H) 05/23/2023    CO2 17 (L) 05/23/2023    BUN 7 05/23/2023    CREATININE 0 48 (L) 05/23/2023    GLUC 163 (H) 05/23/2023    AST 22 05/21/2023    ALT 9 05/21/2023     (H) 10/29/2019    ALKPHOS 66 05/21/2023    TBILI 0 54 05/21/2023    ALB 4 3 05/21/2023    PROTIME 14 8 (H) 05/21/2023    PTT 31 05/21/2023    INR 1 16 05/21/2023     Vitals:    05/23/23 0728   BP:    Pulse:    Resp:    Temp:    SpO2: 100%     CT head 5/22/23  IMPRESSION:     Metastatic disease with multiple intracranial lesions with surrounding vasogenic edema with subfalcine herniation, uncal herniation and left-to-right shift of about 1 5 cm with stable midline shift and mass effect     High attenuation seen within the left insular cistern, unchanged due to perilesional hemorrhage or leptomeningeal disease or sulcal/extra-axial hemorrhage, stable  Physical Exam    Airway       Dental       Cardiovascular      Pulmonary      Other Findings  intubated      Anesthesia Plan  ASA Score- 5 Emergent    Anesthesia Type- general with ASA Monitors  Additional Monitors: arterial line and central venous line  Airway Plan: ETT  Comment: From ICU  Plan Factors-    Chart reviewed  Existing labs reviewed  Patient summary reviewed  Induction- intravenous  Postoperative Plan-     Informed Consent- Plan/risks discussed with: emergent  I personally reviewed this patient with the CRNA  Discussed and agreed on the Anesthesia Plan with the CRNA             Medication Administration - last 24 hours from 05/22/2023 0826 to 05/23/2023 2311       Date/Time Order Dose Route Action Action by     05/23/2023 0417 EDT chlorhexidine (PERIDEX) 0 12 % oral rinse 15 mL 15 mL Mouth/Throat Given Linda Villegas RN     05/22/2023 1646 EDT chlorhexidine (PERIDEX) 0 12 % oral rinse 15 mL -- Mouth/Throat MAR Unhold Tracie Chakraborty MD     05/22/2023 1636 EDT chlorhexidine (PERIDEX) 0 12 % oral rinse 15 mL -- Mouth/Throat MAR Hold Automatic Transfer Provider     05/22/2023 0831 EDT chlorhexidine (PERIDEX) 0 12 % oral rinse 15 mL 15 mL Mouth/Throat Given Kaci Mckay RN     05/22/2023 1101 EDT dexamethasone (DECADRON) injection 4 mg 4 mg Intravenous Given Kaci Mckay RN     05/23/2023 0803 EDT levETIRAcetam (KEPPRA) 500 mg in sodium chloride 0 9 % 100 mL IVPB 500 mg Intravenous [address]     05/22/2023 2130 EDT levETIRAcetam (KEPPRA) 500 mg in sodium chloride 0 9 % 100 mL IVPB 500 mg Intravenous [place] Villegas RN     05/22/2023 1646 EDT levETIRAcetam (KEPPRA) 500 mg in sodium chloride 0 9 % 100 mL IVPB -- Intravenous MAR Danyel Lea MD     05/22/2023 1636 EDT levETIRAcetam (KEPPRA) 500 mg in sodium chloride 0 9 % 100 mL IVPB -- Intravenous MAR Hold Automatic Transfer Provider     05/22/2023 0847 EDT levETIRAcetam (KEPPRA) 500 mg in sodium chloride 0 9 % 100 mL IVPB 0 mg Intravenous Stopped Kaci Mckay RN     05/22/2023 6561 EDT levETIRAcetam (KEPPRA) 500 mg in sodium chloride 0 9 % 100 mL IVPB 500 mg Intravenous [address] Kaci Mckay RN     05/22/2023 1646 EDT albuterol (PROVENTIL HFA,VENTOLIN HFA) inhaler 1 puff -- Inhalation MAR Unhold Tracie Chakraborty MD     05/22/2023 1636 EDT albuterol (PROVENTIL HFA,VENTOLIN HFA) inhaler 1 puff -- Inhalation MAR Hold Automatic Transfer Provider     05/22/2023 1646 EDT famotidine (PEPCID) tablet 40 mg -- Oral MAR Unhold Via Lou Ybarra MD     05/22/2023 1636 EDT famotidine (PEPCID) tablet 40 mg -- Oral [facility] Hold Automatic Transfer Provider     05/22/2023 0831 EDT famotidine (PEPCID) tablet 40 mg 40 mg Oral Not Given Imelda Barros RN     05/22/2023 1646 EDT Fluticasone Furoate-Vilanterol 100-25 mcg/actuation 1 puff -- Inhalation MAR Unhold Via Lou Ybarra MD     05/22/2023 1636 EDT Fluticasone Furoate-Vilanterol 100-25 mcg/actuation 1 puff -- Inhalation MAR Hold Automatic Transfer Provider     05/22/2023 0832 EDT Fluticasone Furoate-Vilanterol 100-25 mcg/actuation 1 puff 1 puff Inhalation Not Given Imelda Barros RN     05/22/2023 1646 EDT umeclidinium 62 5 mcg/actuation inhaler AEPB 1 puff -- Inhalation MAR Unhold Tracie Lee MD     05/22/2023 1636 EDT umeclidinium 62 5 mcg/actuation inhaler AEPB 1 puff -- Inhalation MAR Hold Automatic Transfer Provider     05/22/2023 1748 EDT umeclidinium 62 5 mcg/actuation inhaler AEPB 1 puff 1 puff Inhalation Not Given Imelda Barros, GEOVANY     05/22/2023 2152 EDT gabapentin (NEURONTIN) capsule 100 mg 100 mg Oral Given Sayra Villegas, GEOVANY     05/22/2023 1646 EDT gabapentin (NEURONTIN) capsule 100 mg -- Oral MAR Unhold Tracie Lee MD     05/22/2023 1636 EDT gabapentin (NEURONTIN) capsule 100 mg -- Oral [facility] Hold Automatic Transfer Provider     05/22/2023 1646 EDT thiamine tablet 100 mg -- Oral MAR Unhold Tracie Lee MD     05/22/2023 1636 EDT thiamine tablet 100 mg -- Oral [facility] Hold Automatic Transfer Provider     05/22/2023 0831 EDT thiamine tablet 100 mg 100 mg Oral Not Given Imelda Barros RN     31/61/9079 5794 EDT folic acid (FOLVITE) tablet 1 mg -- Oral MAR Unhold Tracie Lee MD     39/57/0751 9501 EDT folic acid (FOLVITE) tablet 1 mg -- Oral [facility] Hold Automatic Transfer Provider     14/56/7778 7741 EDT folic acid (FOLVITE) tablet 1 mg 1 mg Oral Not Given Imelda Barros RN     05/22/2023 1646 EDT multivitamin-minerals (CENTRUM) tablet 1 tablet -- Oral MAR Unhold Tracie Roman MD     05/22/2023 1636 EDT multivitamin-minerals (CENTRUM) tablet 1 tablet -- Oral MAR Hold Automatic Transfer Provider     05/22/2023 0831 EDT multivitamin-minerals (CENTRUM) tablet 1 tablet 1 tablet Oral Not Given Darrell Chapin, RN     05/23/2023 0805 EDT nicotine (NICODERM CQ) 7 mg/24hr TD 24 hr patch 7 mg 7 mg Transdermal Patch Removed [address], RN     05/22/2023 1646 EDT nicotine (NICODERM CQ) 7 mg/24hr TD 24 hr patch 7 mg -- Transdermal MAR Danyel Lea MD     05/22/2023 1636 EDT nicotine (NICODERM CQ) 7 mg/24hr TD 24 hr patch 7 mg -- Transdermal MAR Hold Automatic Transfer Provider     05/22/2023 0831 EDT nicotine (NICODERM CQ) 7 mg/24hr TD 24 hr patch 7 mg 7 mg Transdermal Medication Applied Darrell Chapin, RN     05/22/2023 1901 EDT multi-electrolyte (PLASMALYTE-A/ISOLYTE-S PH 7 4) IV solution 0 mL/hr Intravenous Stopped Kelly Dial, RN     05/22/2023 1704 EDT multi-electrolyte (PLASMALYTE-A/ISOLYTE-S PH 7 4) IV solution 75 mL/hr Intravenous New Bag Zachery Power, RN     05/22/2023 1529 EDT multi-electrolyte (PLASMALYTE-A/ISOLYTE-S PH 7 4) IV solution 75 mL/hr Intravenous [address] Darrell Horse, RN     05/22/2023 1646 EDT albuterol inhalation solution 2 5 mg -- Nebulization MAR Unhold Tracie Roman MD     05/22/2023 1636 EDT albuterol inhalation solution 2 5 mg -- Nebulization [facility] Hold Automatic Transfer Provider     05/22/2023 1646 EDT hydrALAZINE (APRESOLINE) injection 10 mg -- Intravenous MAR Danyel Lea MD     05/22/2023 1636 EDT hydrALAZINE (APRESOLINE) injection 10 mg -- Intravenous MAR Hold Automatic Transfer Provider     05/22/2023 1101 EDT hydrALAZINE (APRESOLINE) injection 10 mg 10 mg Intravenous Not Given Darrell Chapin RN     05/23/2023 0514 EDT dexamethasone (PF) (DECADRON) injection 6 mg 6 mg Intravenous Given Syed Villegas RN     05/23/2023 0024 EDT dexamethasone (PF) (DECADRON) injection 6 mg 6 mg Intravenous Given Sayra Villegas RN     05/22/2023 1716 EDT dexamethasone (PF) (DECADRON) injection 6 mg 6 mg Intravenous Given Zachery Gómez RN     05/22/2023 1646 EDT dexamethasone (PF) (DECADRON) injection 6 mg -- Intravenous MAR Magalys Sahu MD     05/22/2023 1636 EDT dexamethasone (PF) (DECADRON) injection 6 mg -- Intravenous [facility] Hold Automatic Transfer Provider     05/22/2023 1646 EDT pantoprazole (PROTONIX) injection 40 mg -- Intravenous MAR Danyel Lea MD     05/22/2023 1636 EDT pantoprazole (PROTONIX) injection 40 mg -- Intravenous MAR Hold Automatic Transfer Provider     05/22/2023 1326 EDT pantoprazole (PROTONIX) injection 40 mg 40 mg Intravenous Given Oscar Orona RN     05/22/2023 1636 EDT midazolam (VERSED) injection 2 mg 2 mg Intravenous Given Oscar Orona RN     05/22/2023 1646 EDT acetaminophen (TYLENOL) rectal suppository 325 mg -- Rectal MAR Danyel Lea MD     05/22/2023 1636 EDT acetaminophen (TYLENOL) rectal suppository 325 mg -- Rectal MAR Hold Automatic Transfer Provider     05/22/2023 1333 EDT acetaminophen (TYLENOL) rectal suppository 325 mg 325 mg Rectal Given Oscar Orona RN     05/22/2023 1735 EDT hydrALAZINE (APRESOLINE) injection 10 mg 10 mg Intravenous Given Berta Roper RN     05/22/2023 1646 EDT hydrALAZINE (APRESOLINE) injection 10 mg -- Intravenous MAR Danyel Lea MD     05/22/2023 1636 EDT hydrALAZINE (APRESOLINE) injection 10 mg -- Intravenous MAR Hold Automatic Transfer Provider     05/22/2023 1326 EDT hydrALAZINE (APRESOLINE) injection 10 mg 10 mg Intravenous Given Oscar Orona RN     05/23/2023 0300 EDT potassium phosphates 30 mmol in sodium chloride 0 9 % 250 mL infusion 0 mmol Intravenous Stopped Sayra Villegas, GEOVANY     05/22/2023 1857 EDT potassium phosphates 30 mmol in sodium chloride 0 9 % 250 mL infusion 30 mmol Intravenous New Bag Zachery Gómez, RN 05/22/2023 1646 EDT potassium phosphates 30 mmol in sodium chloride 0 9 % 250 mL infusion -- Intravenous MAR Danyel Lea MD     05/22/2023 1636 EDT potassium phosphates 30 mmol in sodium chloride 0 9 % 250 mL infusion -- Intravenous MAR Hold Automatic Transfer Provider     05/22/2023 1637 EDT potassium chloride 40 mEq IVPB (premix) -- Intravenous MAR Breana Gomez, Pharmacist     05/22/2023 1636 EDT potassium chloride 40 mEq IVPB (premix) -- Intravenous [facility] Hold Automatic Transfer Provider     05/22/2023 1904 EDT potassium chloride 20 mEq IVPB (premix) 0 mEq Intravenous Stopped Zachery Gómez, GEOVANY     05/22/2023 1704 EDT potassium chloride 20 mEq IVPB (premix) 20 mEq Intravenous Santiago Gómez, GEOVANY     05/22/2023 1840 EDT sodium chloride (HYPERTONIC) 3 % bolus 250 mL 0 mL Intravenous Stopped Zachery Gómez RN     05/22/2023 1820 EDT sodium chloride (HYPERTONIC) 3 % bolus 250 mL 250 mL Intravenous [address] Andres Caanles RN     05/22/2023 1840 EDT sodium chloride (HYPERTONIC) 3 % infusion 30 mL/hr Intravenous New Bag Zachery Gómez, GEOVANY     05/22/2023 1759 EDT midazolam (VERSED) injection 2 mg 2 mg Intravenous Not Given Andres Canales RN     05/22/2023 1832 EDT niCARdipine (CARDENE) 2 5 mg/mL injection **ADS Override Pull** --  Canceled Entry Andres Canales RN     05/22/2023 1815 EDT mannitol 25 % injection 75 g 75 g Intravenous Given Zachery Gómez RN     05/22/2023 1830 EDT fentanyl citrate (PF) 100 MCG/2ML 50 mcg 50 mcg Intravenous Given Carmen Reeves RN     05/22/2023 1850 EDT norepinephrine (LEVOPHED) 1 mg/mL injection **ADS Override Pull** 4 mg  Given Zachery Gómez RN     05/23/2023 0801 EDT norepinephrine (LEVOPHED) 4 mg (STANDARD CONCENTRATION) IV in sodium chloride 0 9% 250 mL 1 mcg/min Intravenous Rate/Dose Change Niurka Berg RN     05/23/2023 0557 EDT norepinephrine (LEVOPHED) 4 mg (STANDARD CONCENTRATION) IV in sodium chloride 0 9% 250 mL 4 mcg/min Intravenous New Bag Elvi Hummel, RN     05/23/2023 0007 EDT norepinephrine (LEVOPHED) 4 mg (STANDARD CONCENTRATION) IV in sodium chloride 0 9% 250 mL 12 mcg/min Intravenous New Bag Sayra Villegas, RN     05/22/2023 2307 EDT norepinephrine (LEVOPHED) 4 mg (STANDARD CONCENTRATION) IV in sodium chloride 0 9% 250 mL 12 mcg/min Intravenous Rate/Dose Change Sayra Villegas, RN     05/22/2023 2138 EDT norepinephrine (LEVOPHED) 4 mg (STANDARD CONCENTRATION) IV in sodium chloride 0 9% 250 mL 18 mcg/min Intravenous Rate/Dose Change Sayra Villegas, RN     05/22/2023 1905 EDT norepinephrine (LEVOPHED) 4 mg (STANDARD CONCENTRATION) IV in sodium chloride 0 9% 250 mL 20 mcg/min Intravenous New Bag Sayra Villegas, RN     05/22/2023 1904 EDT fentanyl citrate (PF) 100 MCG/2ML 100 mcg 100 mcg Intravenous Given Zachery Gómez RN     05/23/2023 0211 EDT fentaNYL 1000 mcg in sodium chloride 0 9% 100mL infusion 100 mcg/hr Intravenous New Bag Sayra Villegas, RN     05/22/2023 1909 EDT fentaNYL 1000 mcg in sodium chloride 0 9% 100mL infusion 100 mcg/hr Intravenous Santiago Gómez, GEOVANY     05/22/2023 1942 EDT fentanyl citrate (PF) 100 MCG/2ML 50 mcg 50 mcg Intravenous Given Elvi Hummel RN     05/22/2023 2035 EDT sodium chloride 0 9 % bolus 1,000 mL 0 mL Intravenous Stopped Sayra Villegas, RN     05/22/2023 1933 EDT sodium chloride 0 9 % bolus 1,000 mL 1,000 mL Intravenous New Bag Sayra Villegas, RN     05/22/2023 2007 EDT fentanyl citrate (PF) 100 MCG/2ML 50 mcg 50 mcg Intravenous Given Keon Villegas, RN     05/22/2023 2320 EDT magnesium sulfate 2 g/50 mL IVPB (premix) 2 g 0 g Intravenous Stopped Sayra Villegas, RN     05/22/2023 2118 EDT magnesium sulfate 2 g/50 mL IVPB (premix) 2 g 2 g Intravenous Kishan Villegas, GEOVANY     05/23/2023 0010 EDT propofol (DIPRIVAN) 1000 mg in 100 mL infusion (premix) 50 mcg/kg/min Intravenous Kishan Villegas, GEOVANY     05/22/2023 2008 EDT propofol (DIPRIVAN) 1000 mg in 100 mL infusion (premix) 50 mcg/kg/min Intravenous New Bag Oliversarah LETICIA Francisconychristophe, RN     05/22/2023 2032 EDT sodium chloride 0 9 % infusion 75 mL/hr Intravenous New Bag Oliversarah LETICIA Francisconychristophe, RN     05/23/2023 0612 EDT insulin lispro (HumaLOG) 100 units/mL subcutaneous injection 1-5 Units 1 Units Subcutaneous Given Scooterpetersarah LETICIA Francisconychristophe, RN     05/23/2023 0035 EDT insulin lispro (HumaLOG) 100 units/mL subcutaneous injection 1-5 Units 1 Units Subcutaneous Given Scooterpetersarah LETICIA Bakari, RN     05/22/2023 2255 EDT potassium chloride oral solution 20 mEq 20 mEq Per NG Tube Given Scooterpetersarah LETICIA Bakari, RN     05/23/2023 0416 EDT potassium chloride oral solution 40 mEq 40 mEq Per NG Tube Given Scooterpetersarah LETICIA Bakari, RN     05/23/2023 0312 EDT Gadobutrol injection (SINGLE-DOSE) SOLN 4 mL 4 mL Intravenous Given Liberty Fess     05/23/2023 0559 EDT calcium gluconate 1 g in sodium chloride 0 9% 50 mL (premix) 0 g Intravenous Stopped Sayra LETICIA Bakari, RN     05/23/2023 0514 EDT calcium gluconate 1 g in sodium chloride 0 9% 50 mL (premix) 1 g Intravenous New Bag Scooterpetersarah LETICIA Francisconychristophe, RN     05/23/2023 0800 EDT EPINEPHrine (ADRENALIN) 0 1 mg/mL injection **ADS Override Pull** --  Not Given Debbie Humphreys RN

## 2023-05-23 NOTE — PLAN OF CARE
Problem: SAFETY,RESTRAINT: NV/NON-SELF DESTRUCTIVE BEHAVIOR  Goal: Remains free of harm/injury (restraint for non violent/non self-detsructive behavior)  Description: INTERVENTIONS:  - Instruct patient/family regarding restraint use   - Assess and monitor physiologic and psychological status   - Provide interventions and comfort measures to meet assessed patient needs   - Identify and implement measures to help patient regain control  - Assess readiness for release of restraint   Outcome: Progressing  Goal: Returns to optimal restraint-free functioning  Description: INTERVENTIONS:  - Assess the patient's behavior and symptoms that indicate continued need for restraint  - Identify and implement measures to help patient regain control  - Assess readiness for release of restraint   Outcome: Progressing     Problem: PAIN - ADULT  Goal: Verbalizes/displays adequate comfort level or baseline comfort level  Description: Interventions:  - Encourage patient to monitor pain and request assistance  - Assess pain using appropriate pain scale  - Administer analgesics based on type and severity of pain and evaluate response  - Implement non-pharmacological measures as appropriate and evaluate response  - Consider cultural and social influences on pain and pain management  - Notify physician/advanced practitioner if interventions unsuccessful or patient reports new pain  Outcome: Progressing     Problem: INFECTION - ADULT  Goal: Absence or prevention of progression during hospitalization  Description: INTERVENTIONS:  - Assess and monitor for signs and symptoms of infection  - Monitor lab/diagnostic results  - Monitor all insertion sites, i e  indwelling lines, tubes, and drains  - Monitor endotracheal if appropriate and nasal secretions for changes in amount and color  - [place] appropriate cooling/warming therapies per order  - Administer medications as ordered  - Instruct and encourage patient and family to use good hand hygiene technique  - Identify and instruct in appropriate isolation precautions for identified infection/condition  Outcome: Progressing  Goal: Absence of fever/infection during neutropenic period  Description: INTERVENTIONS:  - Monitor WBC    Outcome: Progressing     Problem: SAFETY ADULT  Goal: Patient will remain free of falls  Description: INTERVENTIONS:  - Educate patient/family on patient safety including physical limitations  - Instruct patient to call for assistance with activity   - Consult OT/PT to assist with strengthening/mobility   - Keep Call bell within reach  - Keep bed low and locked with side rails adjusted as appropriate  - Keep care items and personal belongings within reach  - Initiate and maintain comfort rounds  - Make Fall Risk Sign visible to staff  - Offer Toileting every  Hours, in advance of need  - Initiate/Maintain alarm  - Obtain necessary fall risk management equipment:   - Apply yellow socks and bracelet for high fall risk patients  - Consider moving patient to room near nurses station  Outcome: Progressing  Goal: Maintain or return to baseline ADL function  Description: INTERVENTIONS:  -  Assess patient's ability to carry out ADLs; assess patient's baseline for ADL function and identify physical deficits which impact ability to perform ADLs (bathing, care of mouth/teeth, toileting, grooming, dressing, etc )  - Assess/evaluate cause of self-care deficits   - Assess range of motion  - Assess patient's mobility; develop plan if impaired  - Assess patient's need for assistive devices and provide as appropriate  - Encourage maximum independence but intervene and supervise when necessary  - Involve family in performance of ADLs  - Assess for home care needs following discharge   - Consider OT consult to assist with ADL evaluation and planning for discharge  - Provide patient education as appropriate  Outcome: Progressing  Goal: Maintains/Returns to pre admission functional level  Description: INTERVENTIONS:  - Perform BMAT or MOVE assessment daily    - Set and communicate daily mobility goal to care team and patient/family/caregiver  - Collaborate with rehabilitation services on mobility goals if consulted  - Perform Range of Motion  times a day  - Reposition patient every hours  - Dangle patient  times a day  - Stand patient  times a day  - Ambulate patient  times a day  - Out of bed to chair  times a day   - Out of bed for meals  times a day  - Out of bed for toileting  - Record patient progress and toleration of activity level   Outcome: Progressing     Problem: DISCHARGE PLANNING  Goal: Discharge to home or other facility with appropriate resources  Description: INTERVENTIONS:  - Identify barriers to discharge w/patient and caregiver  - Arrange for needed discharge resources and transportation as appropriate  - Identify discharge learning needs (meds, wound care, etc )  - Arrange for interpretive services to assist at discharge as needed  - Refer to Case Management Department for coordinating discharge planning if the patient needs post-hospital services based on physician/advanced practitioner order or complex needs related to functional status, cognitive ability, or social support system  Outcome: Progressing     Problem: Knowledge Deficit  Goal: Patient/family/caregiver demonstrates understanding of disease process, treatment plan, medications, and discharge instructions  Description: Complete learning assessment and assess knowledge base    Interventions:  - Provide teaching at level of understanding  - Provide teaching via preferred learning methods  Outcome: Progressing     Problem: MOBILITY - ADULT  Goal: Maintain or return to baseline ADL function  Description: INTERVENTIONS:  -  Assess patient's ability to carry out ADLs; assess patient's baseline for ADL function and identify physical deficits which impact ability to perform ADLs (bathing, care of mouth/teeth, toileting, grooming, dressing, etc )  - Assess/evaluate cause of self-care deficits   - Assess range of motion  - Assess patient's mobility; develop plan if impaired  - Assess patient's need for assistive devices and provide as appropriate  - Encourage maximum independence but intervene and supervise when necessary  - Involve family in performance of ADLs  - Assess for home care needs following discharge   - Consider OT consult to assist with ADL evaluation and planning for discharge  - Provide patient education as appropriate  Outcome: Progressing  Goal: Maintains/Returns to pre admission functional level  Description: INTERVENTIONS:  - Perform BMAT or MOVE assessment daily    - Set and communicate daily mobility goal to care team and patient/family/caregiver  - Collaborate with rehabilitation services on mobility goals if consulted  - Perform Range of Motion  times a day  - Reposition patient every  hours    - Dangle patient  times a day  - Stand patient  times a day  - Ambulate patient  times a day  - Out of bed to chair  times a day   - Out of bed for meals  times a day  - Out of bed for toileting  - Record patient progress and toleration of activity level   Outcome: Progressing     Problem: Prexisting or High Potential for Compromised Skin Integrity  Goal: Skin integrity is maintained or improved  Description: INTERVENTIONS:  - Identify patients at risk for skin breakdown  - Assess and monitor skin integrity  - Assess and monitor nutrition and hydration status  - Monitor labs   - Assess for incontinence   - Turn and reposition patient  - Assist with mobility/ambulation  - Relieve pressure over bony prominences  - Avoid friction and shearing  - Provide appropriate hygiene as needed including keeping skin clean and dry  - Evaluate need for skin moisturizer/barrier cream  - Collaborate with interdisciplinary team   - Patient/family teaching  - Consider wound care consult   Outcome: Progressing     Problem: Nutrition/Hydration-ADULT  Goal: Nutrient/Hydration intake appropriate for improving, restoring or maintaining nutritional needs  Description: Monitor and assess patient's nutrition/hydration status for malnutrition  Collaborate with interdisciplinary team and initiate plan and interventions as ordered  Monitor patient's weight and dietary intake as ordered or per policy  Utilize nutrition screening tool and intervene as necessary  Determine patient's food preferences and provide high-protein, high-caloric foods as appropriate       INTERVENTIONS:  - Monitor oral intake, urinary output, labs, and treatment plans  - Assess nutrition and hydration status and recommend course of action  - Evaluate amount of meals eaten  - Assist patient with eating if necessary   - Allow adequate time for meals  - Recommend/ encourage appropriate diets, oral nutritional supplements, and vitamin/mineral supplements  - Order, calculate, and assess calorie counts as needed  - Recommend, monitor, and adjust tube feedings and TPN/PPN based on assessed needs  - Assess need for intravenous fluids  - Provide specific nutrition/hydration education as appropriate  - Include patient/family/caregiver in decisions related to nutrition  Outcome: Progressing   2
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
## 2023-05-24 NOTE — PLAN OF CARE
Problem: SAFETY,RESTRAINT: NV/NON-SELF DESTRUCTIVE BEHAVIOR  Goal: Remains free of harm/injury (restraint for non violent/non self-detsructive behavior)  Description: INTERVENTIONS:  - Instruct patient/family regarding restraint use   - Assess and monitor physiologic and psychological status   - Provide interventions and comfort measures to meet assessed patient needs   - Identify and implement measures to help patient regain control  - Assess readiness for release of restraint   Outcome: Progressing  Goal: Returns to optimal restraint-free functioning  Description: INTERVENTIONS:  - Assess the patient's behavior and symptoms that indicate continued need for restraint  - Identify and implement measures to help patient regain control  - Assess readiness for release of restraint   Outcome: Progressing     Problem: PAIN - ADULT  Goal: Verbalizes/displays adequate comfort level or baseline comfort level  Description: Interventions:  - Encourage patient to monitor pain and request assistance  - Assess pain using appropriate pain scale  - Administer analgesics based on type and severity of pain and evaluate response  - Implement non-pharmacological measures as appropriate and evaluate response  - Consider cultural and social influences on pain and pain management  - Notify physician/advanced practitioner if interventions unsuccessful or patient reports new pain  Outcome: Progressing     Problem: INFECTION - ADULT  Goal: Absence or prevention of progression during hospitalization  Description: INTERVENTIONS:  - Assess and monitor for signs and symptoms of infection  - Monitor lab/diagnostic results  - Monitor all insertion sites, i e  indwelling lines, tubes, and drains  - Monitor endotracheal if appropriate and nasal secretions for changes in amount and color  - [place] appropriate cooling/warming therapies per order  - Administer medications as ordered  - Instruct and encourage patient and family to use good hand hygiene technique  - Identify and instruct in appropriate isolation precautions for identified infection/condition  Outcome: Progressing  Goal: Absence of fever/infection during neutropenic period  Description: INTERVENTIONS:  - Monitor WBC    Outcome: Progressing     Problem: SAFETY ADULT  Goal: Patient will remain free of falls  Description: INTERVENTIONS:  - Educate patient/family on patient safety including physical limitations  - Instruct patient to call for assistance with activity   - Consult OT/PT to assist with strengthening/mobility   - Keep Call bell within reach  - Keep bed low and locked with side rails adjusted as appropriate  - Keep care items and personal belongings within reach  - Initiate and maintain comfort rounds  - Make Fall Risk Sign visible to staff  - Offer Toileting every  Hours, in advance of need  - Initiate/Maintain alarm  - Obtain necessary fall risk management equipment:   - Apply yellow socks and bracelet for high fall risk patients  - Consider moving patient to room near nurses station  Outcome: Progressing  Goal: Maintain or return to baseline ADL function  Description: INTERVENTIONS:  -  Assess patient's ability to carry out ADLs; assess patient's baseline for ADL function and identify physical deficits which impact ability to perform ADLs (bathing, care of mouth/teeth, toileting, grooming, dressing, etc )  - Assess/evaluate cause of self-care deficits   - Assess range of motion  - Assess patient's mobility; develop plan if impaired  - Assess patient's need for assistive devices and provide as appropriate  - Encourage maximum independence but intervene and supervise when necessary  - Involve family in performance of ADLs  - Assess for home care needs following discharge   - Consider OT consult to assist with ADL evaluation and planning for discharge  - Provide patient education as appropriate  Outcome: Progressing  Goal: Maintains/Returns to pre admission functional level  Description: INTERVENTIONS:  - Perform BMAT or MOVE assessment daily    - Set and communicate daily mobility goal to care team and patient/family/caregiver  - Collaborate with rehabilitation services on mobility goals if consulted  - Perform Range of Motion  times a day  - Reposition patient every  hours  - Dangle patient  times a day  - Stand patient  times a day  - Ambulate patient  times a day  - Out of bed to chair  times a day   - Out of bed for meals  times a day  - Out of bed for toileting  - Record patient progress and toleration of activity level   Outcome: Progressing     Problem: DISCHARGE PLANNING  Goal: Discharge to home or other facility with appropriate resources  Description: INTERVENTIONS:  - Identify barriers to discharge w/patient and caregiver  - Arrange for needed discharge resources and transportation as appropriate  - Identify discharge learning needs (meds, wound care, etc )  - Arrange for interpretive services to assist at discharge as needed  - Refer to Case Management Department for coordinating discharge planning if the patient needs post-hospital services based on physician/advanced practitioner order or complex needs related to functional status, cognitive ability, or social support system  Outcome: Progressing     Problem: Knowledge Deficit  Goal: Patient/family/caregiver demonstrates understanding of disease process, treatment plan, medications, and discharge instructions  Description: Complete learning assessment and assess knowledge base    Interventions:  - Provide teaching at level of understanding  - Provide teaching via preferred learning methods  Outcome: Progressing     Problem: MOBILITY - ADULT  Goal: Maintain or return to baseline ADL function  Description: INTERVENTIONS:  -  Assess patient's ability to carry out ADLs; assess patient's baseline for ADL function and identify physical deficits which impact ability to perform ADLs (bathing, care of mouth/teeth, toileting, grooming, dressing, etc )  - Assess/evaluate cause of self-care deficits   - Assess range of motion  - Assess patient's mobility; develop plan if impaired  - Assess patient's need for assistive devices and provide as appropriate  - Encourage maximum independence but intervene and supervise when necessary  - Involve family in performance of ADLs  - Assess for home care needs following discharge   - Consider OT consult to assist with ADL evaluation and planning for discharge  - Provide patient education as appropriate  Outcome: Progressing  Goal: Maintains/Returns to pre admission functional level  Description: INTERVENTIONS:  - Perform BMAT or MOVE assessment daily    - Set and communicate daily mobility goal to care team and patient/family/caregiver  - Collaborate with rehabilitation services on mobility goals if consulted  - Perform Range of Motion  times a day  - Reposition patient every  hours    - Dangle patient  times a day  - Stand patient  times a day  - Ambulate patient  times a day  - Out of bed to chair  times a day   - Out of bed for meals times a day  - Out of bed for toileting  - Record patient progress and toleration of activity level   Outcome: Progressing     Problem: Prexisting or High Potential for Compromised Skin Integrity  Goal: Skin integrity is maintained or improved  Description: INTERVENTIONS:  - Identify patients at risk for skin breakdown  - Assess and monitor skin integrity  - Assess and monitor nutrition and hydration status  - Monitor labs   - Assess for incontinence   - Turn and reposition patient  - Assist with mobility/ambulation  - Relieve pressure over bony prominences  - Avoid friction and shearing  - Provide appropriate hygiene as needed including keeping skin clean and dry  - Evaluate need for skin moisturizer/barrier cream  - Collaborate with interdisciplinary team   - Patient/family teaching  - Consider wound care consult   Outcome: Progressing     Problem: Nutrition/Hydration-ADULT  Goal: Nutrient/Hydration intake appropriate for improving, restoring or maintaining nutritional needs  Description: Monitor and assess patient's nutrition/hydration status for malnutrition  Collaborate with interdisciplinary team and initiate plan and interventions as ordered  Monitor patient's weight and dietary intake as ordered or per policy  Utilize nutrition screening tool and intervene as necessary  Determine patient's food preferences and provide high-protein, high-caloric foods as appropriate       INTERVENTIONS:  - Monitor oral intake, urinary output, labs, and treatment plans  - Assess nutrition and hydration status and recommend course of action  - Evaluate amount of meals eaten  - Assist patient with eating if necessary   - Allow adequate time for meals  - Recommend/ encourage appropriate diets, oral nutritional supplements, and vitamin/mineral supplements  - Order, calculate, and assess calorie counts as needed  - Recommend, monitor, and adjust tube feedings and TPN/PPN based on assessed needs  - Assess need for intravenous fluids  - Provide specific nutrition/hydration education as appropriate  - Include patient/family/caregiver in decisions related to nutrition  Outcome: Progressing

## 2023-05-24 NOTE — ASSESSMENT & PLAN NOTE
· POD  from L frontotemporal craniotomy for tumor resection Sloane Ayad 5/23/2023)   · Patient presented with altered sensorium and generalized weakness for 1 month    Imaging:   • CT head 5/23/2023: Status post left-sided craniotomy for resection of 2 dominant metastatic lesions with expected postoperative changes  Small foci of hyperintensity within the left brainstem likely sequelae of previous seen uncal herniation  Small left superior cerebellar lacunar infarct new since prior  Grossly stable rightward midline shift with partial entrapment of the L lateral ventricle and transependymal flow  • Mri brain 5/23/2023: Expected postsurgical change from resection of left frontal lesion without evidence of residual tumor  Decreased edema in the left frontotemporal region  Stable midline shift  Remainder of lesions in the bilateral cerebral hemispheres and perilesional edema stable  Catherne Kevin lesional ischemia lining the resection cavity  Small infarcts in the bilateral occipital lobes and left cerebellar hemisphere  Stable extra-axial collection of gas and fluid subadjacent to the craniotomy  Stable partial effacement of the ventricles with transependymal flow  Plan:   · ongoing frequent neurological checks  · Recommend STAT CT head for decline in GCS >2 points in 1 hour  · MRI brain completed  · Gradually wean sedation towards extubation per [facility]   · Now off all sedation  Monitor neurologic exam  · Continue 3% hypertonic saline with hypernatremia goal   · Decadron taper as ordered   · Keppra 500mg BID x7 days unless concern for seizure   · Ancef x24 hours   · JULIUS drain- 90cc since surgery     · plan to remove JULIUS drain around lunch  · Will take down head wrap at that time   · DVT ppx: SCD's only   Cleared to start lovenox this evening after JULIUS drain removed  · Hold all AC/AP medication at this time  · Ongoing medical management and pain control per primary team  · Systolic BP <577 and MAP >82   · Palliative care consultation   · Plan for PT/OT/SLT once extubated   · CM following for dispo planning  · Family updated at bedside   · Neurosurgery will continue  Call with questions or concerns

## 2023-05-24 NOTE — SPEECH THERAPY NOTE
Speech/Language Pathology Progress Note    Patient Name: Francheska Sumenr Date: 5/24/2023     Pt is currently intubated s/p OR  Orders completed, please re consult ST when able/appropriate

## 2023-05-24 NOTE — PROGRESS NOTES
[facility]  Progress Note  Name: Alexus Pruett  MRN: [id number]  Unit/Bed#: ICU 09 I Date of Admission: 5/21/2023   Date of Service: 5/24/2023 I Hospital Day: 3    Assessment/Plan   * Brain mass  Assessment & Plan  · POD  from L frontotemporal craniotomy for tumor resection Bautista Graham 5/23/2023)   · Patient presented with altered sensorium and generalized weakness for 1 month    Imaging:   • CT head 5/23/2023: Status post left-sided craniotomy for resection of 2 dominant metastatic lesions with expected postoperative changes  Small foci of hyperintensity within the left brainstem likely sequelae of previous seen uncal herniation  Small left superior cerebellar lacunar infarct new since prior  Grossly stable rightward midline shift with partial entrapment of the L lateral ventricle and transependymal flow  • Mri brain 5/23/2023: Expected postsurgical change from resection of left frontal lesion without evidence of residual tumor  Decreased edema in the left frontotemporal region  Stable midline shift  Remainder of lesions in the bilateral cerebral hemispheres and perilesional edema stable  Luberta Rocher lesional ischemia lining the resection cavity  Small infarcts in the bilateral occipital lobes and left cerebellar hemisphere  Stable extra-axial collection of gas and fluid subadjacent to the craniotomy  Stable partial effacement of the ventricles with transependymal flow  Plan:   · ongoing frequent neurological checks  · Recommend STAT CT head for decline in GCS >2 points in 1 hour  · MRI brain completed  · Gradually wean sedation towards extubation per [facility]   · Now off all sedation    Monitor neurologic exam  · Continue 3% hypertonic saline with hypernatremia goal   · Decadron taper as ordered   · Keppra 500mg BID x7 days unless concern for seizure   · Ancef x24 hours   · JULIUS drain- 90cc since surgery     · plan to remove JULIUS drain around lunch  · Will take down head wrap at "that time   · DVT ppx: SCD's only  Cleared to start lovenox this evening after JULIUS drain removed  · Hold all AC/AP medication at this time  · Ongoing medical management and pain control per primary team  · Systolic BP <806 and MAP >01   · Palliative care consultation   · Plan for PT/OT/SLT once extubated   · CM following for dispo planning  · Family updated at bedside   · Neurosurgery will continue  Call with questions or concerns  Subjective/Objective   Chief Complaint: None     Subjective: Patient remains intubated at this time  Now off sedation with continued depressed neurological status  Rounds completed with RN at bedside     Objective: laying in bed     I/O       05/22 0701 05/23 0700 05/23 0701 05/24 0700 05/24 0701 05/25 0700    I V  (mL/kg) 4446 (108 7) 2471 5 (60 6)     NG/GT  120     IV Piggyback 1700 720     Total Intake(mL/kg) 6146 (150 3) 3311 5 (81 2)     Urine (mL/kg/hr) 2660 (2 7) 4700 (4 8) 100 (0 6)    Emesis/NG output 250      Drains  90     Blood  75     Total Output 2910 4865 100    Net +3236 -1553 5 -100                 Invasive Devices     Central Venous Catheter Line  Duration           CVC Central Lines 05/22/23 Triple 16cm 1 day          Peripheral Intravenous Line  Duration           Peripheral IV 05/22/23 Right Antecubital 2 days    Peripheral IV 05/22/23 Left Forearm 1 day          Arterial Line  Duration           Arterial Line 05/22/23 Radial 1 day          Drain  Duration           Urethral Catheter Temperature probe 16 Fr  2 days    NG/OG/Enteral Tube Orogastric 18 Fr Right mouth 1 day    Closed/Suction Drain Left Head Bulb 10 Fr  <1 day          Airway  Duration           ETT  Cuffed 1 day                Physical Exam:  Vitals: Blood pressure 131/79, pulse 102, temperature 98 2 °F (36 8 °C), resp  rate 20, height 4' 11\" (1 499 m), weight 40 8 kg (90 lb 0 1 oz), last menstrual period 06/02/2019, SpO2 100 %  ,Body mass index is 18 18 kg/m²      Hemodynamic Monitoring: " MAP: Arterial Line MAP (mmHg): 96 mmHg     General appearance: no acute distress  Head: Normocephalic  Eyes: 2mm bilaterally    Neck: supple, symmetrical, trachea midline  Back: no kyphosis present  Lungs: non labored breathing  Heart: regular heart rate  Neurologic: GCS 7T  E2, V1T, M4  Withdraw to pain in the BLE  Some extension noted in the BUE  Eye opening to noxious stimuli  Not tracking  Cough gag and corneal intact  Lab Results:  Results from last 7 days   Lab Units 05/24/23  0558 05/23/23  1141 05/23/23  1036 05/23/23  0614 05/21/23  1705   EOS PCT %  --   --   --  0 1   HEMATOCRIT % 33 4*  --   --  35 1 44 9   HEMATOCRIT, ISTAT %  --  35 32*  --   --    HEMOGLOBIN g/dL 10 8*  --   --  12 4 15 4   I STAT HEMOGLOBIN g/dl  --  11 9 10 9*  --   --    MONOS PCT %  --   --   --   --  12   MONO PCT %  --   --   --  3*  --    NEUTROS PCT %  --   --   --   --  78*   PLATELETS Thousands/uL 187  --   --  247 232   WBC Thousand/uL 11 05*  --   --  19 38* 8 28     Results from last 7 days   Lab Units 05/24/23  0558 05/23/23  2355 05/23/23  1857 05/23/23  1337 05/23/23  1141 05/23/23  1036 05/22/23  1526 05/21/23  1705   ALK PHOS U/L  --   --   --   --   --   --   --  66   ALT U/L  --   --   --   --   --   --   --  9   AST U/L  --   --   --   --   --   --   --  22   BUN mg/dL 9 7 6   < >  --   --    < > 25   CALCIUM mg/dL 7 9* 8 0* 8 1*   < >  --   --    < > 9 8   CHLORIDE mmol/L 134* 133* 129*   < >  --   --    < > 102   CO2 mmol/L 26 25 26   < >  --   --    < > 21   CO2, I-STAT mmol/L  --   --   --   --  25 25   < >  --    CREATININE mg/dL 0 65 0 64 0 64   < >  --   --    < > 0 56*   GLUCOSE, ISTAT mg/dl  --   --   --   --  284* 238*  --   --    POTASSIUM mmol/L 3 8 4 0 4 4   < >  --   --    < > 3 5    < > = values in this interval not displayed       Results from last 7 days   Lab Units 05/24/23  0558 05/23/23  0614 05/22/23  1526   MAGNESIUM mg/dL 1 9 2 2 1 6     Results from last 7 days   Lab Units "05/24/23  0558 05/23/23  0614 05/22/23  1526   PHOSPHORUS mg/dL 4 4 2 8 1 8*     Results from last 7 days   Lab Units 05/24/23  0558 05/21/23  1705   INR  1 40* 1 16   PTT seconds 29 31     No results found for: \"TROPONINT\"  ABG:  Lab Results   Component Value Date    BEART -1 4 05/23/2023    CJY1CMO 22 8 05/23/2023    WTC1BPV 36 6 05/23/2023    PHART 7 412 05/23/2023    PO2ART 145 1 (H) 05/23/2023    SOURCE Line, Arterial 05/23/2023       Imaging Studies: I have personally reviewed pertinent reports  and I have personally reviewed pertinent films in PACS  CT head wo contrast    Result Date: 5/23/2023  Impression: Status post left-sided craniotomy for the resection of 2 dominant metastatic lesions with expected postoperative hemorrhage and pneumocephalus in the operative bed  Small foci of Duret type hemorrhage within the left brainstem likely sequela of previously seen uncal herniation, which has improved  Findings appear new since prior CT and grossly stable since recent MRI  Note is also made of a new left superior cerebellar lacunar infarct, new since prior exams  Grossly stable rightward midline shift with partial entrapment of the right lateral ventricle and transependymal flow of CSF  The study was marked in [facility] for immediate notification  Workstation performed: [id number]     XR chest portable    Result Date: 5/23/2023  Impression: Background emphysema  Known left lower lobe mass overlies the left hilum  No pneumothorax after right IJ central venous catheter placement  Workstation performed: [id number]     MRI brain w wo contrast    Result Date: 5/23/2023  Impression: Multiple enhancing lesions identified within the brain parenchyma consistent with metastatic disease  The largest lesions are seen within the left hemisphere involving the insular region and anterior left frontal lobe with irregular peripheral enhancement and extensive surrounding vasogenic edema   Multiple additional smaller lesions are " scattered within the brain parenchyma  The edema and mass effect results in approximately 8 mm of left to right shift with partial effacement of the suprasellar cistern  Workstation performed: [id number]     CT head wo contrast    Result Date: 5/22/2023  Impression: Metastatic disease with multiple intracranial lesions with surrounding vasogenic edema with subfalcine herniation, uncal herniation and left-to-right shift of about 1 5 cm with stable midline shift and mass effect High attenuation seen within the left insular cistern, unchanged due to perilesional hemorrhage or leptomeningeal disease or sulcal/extra-axial hemorrhage, stable I personally discussed this study with LYRIC AMARO on 5/22/2023 5:04 PM  Workstation performed: [id number]     CT chest abdomen pelvis w contrast    Result Date: 5/22/2023  Impression: 3 x 2 8 x 2 6 cm mass superior segment of the left lower lobe highly suspicious for primary lung cancer  Additional scattered bilateral subcentimeter nodules are indeterminate  Upper lobe predominant emphysema  No metastatic disease in the abdomen or pelvis  The study was marked in [facility] for immediate notification  Workstation performed: [id number]     XR hip/pelv 2-3 vws right    Result Date: 5/22/2023  Impression: No acute osseous abnormality  Workstation performed: [id number]     XR chest 1 view portable    Result Date: 5/22/2023  Impression: Right middle lobe opacity may represent pneumonia or atelectasis  Left hilar mass  The study was marked in [facility] for immediate notification    Workstation performed: [id number]     CT head without contrast    Result Date: 5/21/2023  Impression: Multiple (at least 4) intracranial mass lesions as above including dominant left frontal/basal ganglia lesions with significant associated vasogenic edema in the left greater than right hemispheres highly concerning for metastases   Significant associated  mass effect including 1 cm rightward subfalcine herniation  Nonspecific marginal hyperattenuation suspicious for small amount of perilesional hemorrhage  Associated ventricular entrapment/obstructive hydrocephalus  Urgent neurosurgery consult and follow-up gadolinium-enhanced MRI of the brain advised  Above findings discussed with Dr Jeannie Dawkins at 6:30 p m  on 5/21/2023  Workstation performed: [id number]       EKG, Pathology, and Other Studies: I have personally reviewed pertinent reports        VTE Pharmacologic Prophylaxis: lovenox later tonight     VTE Mechanical Prophylaxis: sequential compression device

## 2023-05-24 NOTE — PLAN OF CARE
Problem: PAIN - ADULT  Goal: Verbalizes/displays adequate comfort level or baseline comfort level  Description: Interventions:  - Encourage patient to monitor pain and request assistance  - Assess pain using appropriate pain scale  - Administer analgesics based on type and severity of pain and evaluate response  - Implement non-pharmacological measures as appropriate and evaluate response  - Consider cultural and social influences on pain and pain management  - Notify physician/advanced practitioner if interventions unsuccessful or patient reports new pain  Outcome: Progressing     Problem: INFECTION - ADULT  Goal: Absence or prevention of progression during hospitalization  Description: INTERVENTIONS:  - Assess and monitor for signs and symptoms of infection  - Monitor lab/diagnostic results  - Monitor all insertion sites, i e  indwelling lines, tubes, and drains  - Monitor endotracheal if appropriate and nasal secretions for changes in amount and color  - [place] appropriate cooling/warming therapies per order  - Administer medications as ordered  - Instruct and encourage patient and family to use good hand hygiene technique  - Identify and instruct in appropriate isolation precautions for identified infection/condition  Outcome: Progressing  Goal: Absence of fever/infection during neutropenic period  Description: INTERVENTIONS:  - Monitor WBC    Outcome: Progressing     Problem: Knowledge Deficit  Goal: Patient/family/caregiver demonstrates understanding of disease process, treatment plan, medications, and discharge instructions  Description: Complete learning assessment and assess knowledge base    Interventions:  - Provide teaching at level of understanding  - Provide teaching via preferred learning methods  Outcome: Progressing     Problem: Prexisting or High Potential for Compromised Skin Integrity  Goal: Skin integrity is maintained or improved  Description: INTERVENTIONS:  - Identify patients at risk for skin breakdown  - Assess and monitor skin integrity  - Assess and monitor nutrition and hydration status  - Monitor labs   - Assess for incontinence   - Turn and reposition patient  - Assist with mobility/ambulation  - Relieve pressure over bony prominences  - Avoid friction and shearing  - Provide appropriate hygiene as needed including keeping skin clean and dry  - Evaluate need for skin moisturizer/barrier cream  - Collaborate with interdisciplinary team   - Patient/family teaching  - Consider wound care consult   Outcome: Progressing     Problem: Nutrition/Hydration-ADULT  Goal: Nutrient/Hydration intake appropriate for improving, restoring or maintaining nutritional needs  Description: Monitor and assess patient's nutrition/hydration status for malnutrition  Collaborate with interdisciplinary team and initiate plan and interventions as ordered  Monitor patient's weight and dietary intake as ordered or per policy  Utilize nutrition screening tool and intervene as necessary  Determine patient's food preferences and provide high-protein, high-caloric foods as appropriate       INTERVENTIONS:  - Monitor oral intake, urinary output, labs, and treatment plans  - Assess nutrition and hydration status and recommend course of action  - Evaluate amount of meals eaten  - Assist patient with eating if necessary   - Allow adequate time for meals  - Recommend/ encourage appropriate diets, oral nutritional supplements, and vitamin/mineral supplements  - Order, calculate, and assess calorie counts as needed  - Recommend, monitor, and adjust tube feedings and TPN/PPN based on assessed needs  - Assess need for intravenous fluids  - Provide specific nutrition/hydration education as appropriate  - Include patient/family/caregiver in decisions related to nutrition  Outcome: Progressing

## 2023-05-24 NOTE — RESPIRATORY THERAPY NOTE
Resp care   05/24/23 0859   Respiratory Assessment   Assessment Type Assess only   General Appearance Unresponsive   Respiratory Pattern Assisted   Chest Assessment Chest expansion symmetrical   Bilateral Breath Sounds Diminished   Resp Comments Received pt on A/C with settings per flow sheet  Sedation off  Pt placed in Cpap-6, PS-6  with in 1 minute  's, /100   Pt placed back on A/C  RN aware

## 2023-05-24 NOTE — RESPIRATORY THERAPY NOTE
RT Ventilator Management Note  Rebecca Pena 47 y o  female MRN: [id number]  Unit/Bed#: ICU 09 Encounter: [id number]      Daily Screen         5/22/2023  1830 5/23/2023  0728          Patient safety screen outcome[de-identified] Failed Failed      Not Ready for Weaning due to[de-identified] -- --                Physical Exam:   Assessment Type: Assess only  General Appearance: Sedated  Respiratory Pattern: (P) Assisted  Chest Assessment: (P) Chest expansion symmetrical  Bilateral Breath Sounds: (P) Clear, Diminished      Resp Comments: (P) Pt has been stable on current sent settings  No changes made at this time   No distress noted

## 2023-05-24 NOTE — RESPIRATORY THERAPY NOTE
05/24/23 1923   Respiratory Assessment   Assessment Type Assess only   General Appearance Sedated   Respiratory Pattern Assisted   Chest Assessment Chest expansion symmetrical   Bilateral Breath Sounds Clear;Diminished   Resp Comments Rec'd pt  on CMV settings, tolerating well  Pt  sx for small amounts of white secretions  Resp tx given via aerogen  ETT repositioned to the right side  Alarms set and functioning  Will continue to monitor resp status  Vent Information   Vent ID [id number]   Vent type Vasquez G5   Vasquez C3/G5 Vent Mode (S)CMV   $ Pulse Oximetry Spot Check Charge Completed   SpO2 100 %   (S)CMV Settings   Resp Rate (BPM) 14 BPM   VT (mL) 350 mL   FIO2 (%) 40 %   PEEP (cmH2O) 6 cmH2O   Insp Time (%) 1 %   Flow Trigger (LPM) 5   Humidification Heater   Heater Temperature (Set) 95 °F (35 °C)   Pause Time (%) 0 %   (S)CMV Actuals   Resp Rate (BPM) 14 BPM   VT (mL) 354   MV 5 2   MAP (cmH2O) 6 3 cmH2O   Peak Pressure (cmH2O) 9 7 cmH2O   I:E Ratio (Obs) 1:2 8   Insp Resistance 14   Heater Temperature (Obs) 95 °F (35 °C)   Static Compliance (mL/cmH20) 31 mL/cmH2O   Plateau Pressure (cm H2O) 14 2 cm H2O   (S)CMV Alarms   High Peak Pressure (cmH2O) 40   Low Pressure (cmH2O) 5 cm H2O   High Resp Rate (BPM) 40 BPM   Low Resp Rate (BPM) 8 BPM   High MV (L/min) 20 L/min   Low MV (L/min) 4 L/min   High VT (mL) 1000 mL   Low VT (mL) 250 mL   Apnea Time (s) 20 S   Maintenance   Alarm (pink) cable attached No   Resuscitation bag with peep valve at bedside Yes   Water bag changed No   Circuit changed No   Daily Screen   Patient safety screen outcome: Failed   Not Ready for Weaning due to:  Underline problem not resolved   IHI Ventilator Associated Pneumonia Bundle   Daily Assessment of Readiness to Extubate Yes   ETT  Cuffed   Placement Date/Time: 05/22/23 (c) 5930   Type: Cuffed  Location: Oral  Insertion attempts: 1  Placement Verification: End tidal CO2;Symmetrical chest wall movement  Secured at (cm): 22 Secured at (cm) 22   Measured from Lips   Secured Location Left   Repositioned Center to Right   Secured by Commercial tube [place]   Site Condition Dry   HI-LO Suction  Intermittent suction   HI-LO Secretions Scant   HI-LO Intervention Patent     RT Ventilator Management Note  Haroon López 47 y o  female MRN: [id number]  Unit/Bed#: ICU 09 Encounter: [id number]      Daily Screen         5/24/2023  0834 5/24/2023 1923          Patient safety screen outcome[de-identified] Passed Failed (P)       Not Ready for Weaning due to[de-identified] -- Underline problem not resolved (P)       Spont breathing trial % for 30 min: Yes --      Spont breathing trial outcome[de-identified] Failed --      Spont breathing trial reason failed: Hemodynamic unstable --      Previous settings resumed: Yes --                Physical Exam:   Assessment Type: (P) Assess only  General Appearance: (P) Sedated  Respiratory Pattern: (P) Assisted  Chest Assessment: (P) Chest expansion symmetrical  Bilateral Breath Sounds: (P) Clear, Diminished      Resp Comments: (P) Rec'd pt  on CMV settings, tolerating well  Pt  sx for small amounts of white secretions  Resp tx given via aerogen  ETT repositioned to the right side  Alarms set and functioning  Will continue to monitor resp status

## 2023-05-24 NOTE — PROGRESS NOTES
[facility]  Progress Note: Critical Care  Name: Anne Arthur 47 y o  female I MRN: [id number]  Unit/Bed#: ICU 09 I Date of Admission: 5/21/2023   Date of Service: 5/24/2023 I Hospital Day: 3    Assessment/Plan   Neuro:   • Diagnosis: Brain metastases  - 1 month hx of waxing/waning weakness  • 1 week of worsening confusion leading to admission  - 5/21 CTH- Multiple (at least 4) intracranial mass lesions as above including dominant left frontal/basal ganglia lesions with significant associated vasogenic edema in the left greater than right hemispheres highly concerning for metastases  Significant associated mass effect including 1 cm rightward subfalcine herniation  Nonspecific marginal hyperattenuation suspicious for small amount of perilesional hemorrhage  Associated ventricular entrapment/obstructive hydrocephalus  - 5/21 CT chest, abd, pelvis-  x 2 8 x 2 6 cm mass superior segment of the left lower lobe highly suspicious for primary lung cancer  Additional scattered bilateral subcentimeter nodules are indeterminate  Upper lobe predominant emphysema  No metastatic disease in the abdomen or pelvis  - Received Decadron 4 mg q6hr  - Started on Keppra 500 mg BID   - Mental status and neuro exam worsened on 5/22--> Suspicion for worsening herniation/increased ICP--> Required CVC and intubation  • Received 75 g mannitol, HTS 30 ml/hr  • 5/22 POCUS optic nerve sheath--> Both optic nerve sheaths > 5 mm concerning for increased ICP  - 5/23 MRI brain w/wo contrast- Multiple enhancing lesions identified within the brain parenchyma consistent with metastatic disease  The largest lesions are seen within the left hemisphere involving the insular region and anterior left frontal lobe with irregular peripheral enhancement and extensive surrounding vasogenic edema  Multiple additional smaller lesions are scattered within the brain parenchyma   The edema and mass effect results in approximately 8 mm of left to right shift with partial effacement of the suprasellar cistern  - 5/23 Osmolar gap 15  - 5/23- Underwent left frontal craniotomy for tumor resection- 2 large metastatic lesions resected, frozen consistent with metastatic carcinoma  - 5/23- Post-op MRI - Post-surgical changes, no residual tumor  Focal infarct along left frontal resection cavity and small infarcts in bilateral occipital lobes and left cerebellar hemisphere  Stable left-to-right midline shift of 8 mm  Decreased vasogenic edema  Remainder of lesions and edema stable  - 5/24- Na+ 160, serum osm 335 (goal Na 145-155, Osm 310-320)  ? Plan:   - Decadron taper as ordered (4 mg q6 5/24)  - Continue Keppra 500 mg q12hrs (day 4/7)  - D/c Ancef today  - Monitor JULIUS drain output  - Head of bed elevation >30 degrees  - Continue sedation  - HTS d/c'd 5/24 @0137 d/t Na over goal (145-155), continue to monitor q6 BMP and osmolality  - Maintain SBP < 160, MAP > 65  - PRN IV hydralazine 10 mg, titrate Levophed (@1 mcg/min 5/24)  - Attempt to get off levo  - Maintain euthermia  • Diagnosis: Sedation  - After intubation on 5/22, started on fentanyl 100 mcg/hr and 50 mcg/kg/min propofol  - Patient became hypertensive/tachycardic when sedation off  - Propofol turned off 5/23 @~13:30  ? Plan:   - Continue fentanyl 100 mcg/hr, taper as tolerated to 50 mcg/hr and attempt SAT/SBT today  - PRN oxycodone and Dilaudid  • Diagnosis: Alcohol abuse  - Drinks 6 beers/day  - Decreased alcohol intake (1 beer/day) over past week  - On CIWA-> Maximum of 6 on 5/22  - Currently intubated and mechanically ventilated   ? Plan:   - Continue thiamine and folate   • Diagnosis: Depression  - On Paxil 10 mg daily at home  ? Plan:   - Hold for NPO  • Diagnosis: Tobacco use  - 40 pack-years  ?  Plan:   - Continue nicoderm patch    CV:   · Diagnosis: No active issues  · 5/23 - Echocardiogram - LV EF 70%, increased wall thickness, cannot exclude regional wall motion abnormality d/t difficult study      Pulm:  • Diagnosis: Mechanical ventilation  · On ACVC 350/20/40/6, breathing at 20  · FiO2 decreased from 60% on 5/23  · Saturating well %, acidosis resolved as evidenced by ABG on 5/23   · Plan:  - Attempt SAT and SBT today  • Diagnosis: COPD          - Home meds:  - Trelegy Ellipta 100, 1 inhalation once a day  - Albuterol rescue inhaler 2 inhalations 4x/day PRN  - Fluticasone nasal spray 2 sprays to each nostril QD  - Astelin nasal spray 2 sprays to each nostril BID  - ICS-LABA and LAMA d/c'd 5/23 d/t being intubated  · Plan:   - Albuterol neb PRN  GI:   · Diagnosis: GERD  · On Prilosec 40 mg at home  · Plan:   · Continue IV Protonix 40 mg      :   · Diagnosis: No active issues  · Plan:   · Continue to monitor renal function      F/E/N:   · Fluids: None  · Electrolytes:   · 40 mEq KCl oral solution x2 5/23 for K+ of 2 9--> K+ up to 4 0  · Phosphates 21 mmol x1 5/23 for phosphorus 2 8--> up to 4 4 5/24  · 5/24 Mag 1 9 from 2 2--> Replete   · Calcium 7 9->Likely from hypoalbuminemia  · Replete aggressively (Mg2+>2, Phos>3, K+> 4)  · Nutrition:  · Jevity 1 2 40 ml/hr goal  · Start enteral nutrition as per nutrition recommendations  · May need reevaluation since patient is off propofol  · At risk for refeeding syndrome--> Continue to trend K+ and Phos    Heme/Onc:   · Diagnosis: Leukocytosis  · 5/23 WBC 19 38 from 8 2   · Manual differential shows 13% bands, 0% lymphocytes, 18 8 ANC  · Etiology infectious vs steroid-induced  · 5/24 WBC down to 11 05  · Plan:   · Continue to trend CBC  · Diagnosis: Macrocytic anemia  · Hgb 15 4->12 4->10 8  · Plan:  · Continue to trend CBC  · Transfuse if Hgb<7  Endo:   · Diagnosis: No active issues  · Glucose 117-172  · On algorithm 1  · Plan:   · Continue to monitor  · Maintain euglycemia 140-180    ID:   · Diagnosis: Abnormal urinalysis  · Macroscopic urinalysis shows positive nitrite, negative leukocytes  · No specimen received for microscopic analysis  · Patient met SIRS criteria on 5/22 with temp <36 C and RR> 20  · Leukocytosis of 19 38 on 5/23, bandemia of 13%  · Started on IV ceftriaxone 1 g on 5/23, did not receive 1st dose  · Blood cultures negative x2  · MRSA culture discontinued because improperly collected  · Plan:   · Repeat MRSA culture  · Continue IV ceftriaxone 1 g (Day 1 of 3), not given 5/23  · Trend CBC  · Trend fever curve      MSK/Skin:   • Diagnosis: Right hip pain  - Per patient's family, patient has been having right hip pain for the past year  - Seen by pain medicine, patient declined steroid injections because afraid of needles  - 5/21- XR hip/pelvis right- No lytic/blastic osseous lesions, no acute osseous abnormality  ? Plan:   - Continue to monitor  LDA: R radial arterial line, RIJ CVC, Fatima, JULIUS drain    PPx:   · VTE: SCDs, AC/AP held  · Stress ulcer: IV Protonix, d/c once tube feeds begin  Disposition: Critical care    ICU Core Measures     Vented Patient  VAP Bundle  VAP bundle ordered     A: Assess, Prevent, and Manage Pain · Has pain been assessed? Yes  · Need for changes to pain regimen? Yes   B: Both Spontaneous Awakening Trials (SATs) and Spontaneous Breathing Trials (SBTs) · Plan to perform spontaneous awakening trial today? Yes   · Plan to perform spontaneous breathing trial today? Yes   · Obvious barriers to extubation? Yes   C: Choice of Sedation · RASS Goal: -4 Deep Sedation or 0 Alert and Calm  · Need for changes to sedation or analgesia regimen? Yes   D: Delirium · CAM-ICU: Unable to perform secondary to Acute cognitive dysfunction   E: Early Mobility  · Plan for early mobility? No   F: Family Engagement · Plan for family engagement today? Yes       Antibiotic Review: Awaiting culture results  Review of Invasive Devices:     Fatima Plan: Post operative urological requirements  Central access plan: Medications requiring central line  [place] Plan: Keep arterial line for hemodynamic monitoring and frequent labs    Prophylaxis:  VTE Contraindicated secondary to: Post-op brain mets resection   Stress Ulcer  covered byfamotidine (PEPCID) tablet 40 mg [[id number]], pantoprazole (PROTONIX) injection 40 mg [[id number]]          Subjective   HPI/24hr events: Patient remained sedated and mechanically ventilated on 100 mcg/hr of fentanyl  Patient was seen gagging on the ETT  Patient tolerated MRI on fentanyl drip and received one PRN of 50 mcg fentanyl  Hypertonic saline was discontinued this morning at 0137 due to Na+ above goal (145-155)  Review of Systems   Unable to perform ROS: Intubated        Objective                            Vitals I/O      Most Recent Min/Max in 24hrs   Temp 97 9 °F (36 6 °C) Temp  Min: 97 2 °F (36 2 °C)  Max: 98 2 °F (36 8 °C)   Pulse 58 Pulse  Min: 40  Max: 122   Resp 19 Resp  Min: 19  Max: 28   /69 BP  Min: 82/68  Max: 188/86   O2 Sat 100 % SpO2  Min: 95 %  Max: 100 %      Intake/Output Summary (Last 24 hours) at 5/24/2023 0617  Last data filed at 5/24/2023 0200  Gross per 24 hour   Intake 2969 95 ml   Output 4635 ml   Net -1665 05 ml         Diet NPO; Sips with meds     Invasive Monitoring Physical exam   Arterial Line  Chastity BP 94/82  Arterial Line BP  Min: 78/66  Max: 166/70   MAP 86 mmHg  Arterial Line MAP (mmHg)  Min: 70 mmHg  Max: 126 mmHg    Physical Exam  Constitutional:       Appearance: She is ill-appearing  Comments: Appears malnourished, intubated and mechanically ventilated   HENT:      Head: Normocephalic  Comments: Clean and dry head dressing     Nose: Nose normal       Mouth/Throat:      Mouth: Mucous membranes are moist    Eyes:      General: No scleral icterus  Conjunctiva/sclera: Conjunctivae normal       Comments: Pupils ~2 mm bilaterally, nonreactive to light, corneal reflex present bilaterally   Cardiovascular:      Rate and Rhythm: Normal rate and regular rhythm  Pulses: Normal pulses  Heart sounds: Normal heart sounds   No murmur heard   Pulmonary:      Breath sounds: Normal breath sounds  No wheezing or rales  Comments: Mechanically ventilated  Abdominal:      General: Abdomen is flat  Bowel sounds are normal  There is no distension  Palpations: Abdomen is soft  Musculoskeletal:      Right lower leg: No edema  Left lower leg: No edema  Skin:     General: Skin is warm and dry  Capillary Refill: Capillary refill takes less than 2 seconds  Neurological:      Comments: Patient unresponsive, negative gag, positive cough reflex, withdraws to pain LLE/LUE > RLE/RUE, decerebrate posturing, up-going babinski on right foot            Diagnostic Studies      EK/21- Sinus rhythm with short MI, otherwise normal ECG  Imagin/23 CTH: Small left superior cerebellar lacunar infarct new since prior   - Post-op MRI brain w/wo - Post-surgical changes, no residual tumor  Focal infarct along left frontal resection cavity and small infarcts in bilateral occipital lobes and left cerebellar hemisphere  Stable left-to-right midline shift of 8 mm  Decreased vasogenic edema  Remainder of lesions and edema stable   - Echocardiogram - LV EF 70%, increased wall thickness, cannot exclude regional wall motion abnormality d/t difficult study   I have personally reviewed pertinent reports         Medications:  Scheduled PRN   cefazolin, 2,000 mg, Q8H  cefTRIAXone, 1,000 mg, Q24H  chlorhexidine, 15 mL, Q12H NATIVIDAD  chlorhexidine, 15 mL, Once  dexamethasone, 4 mg, Q6H [address]   Followed by  Rita Hayes ON 2023] dexamethasone, 4 mg, Q8H   Followed by  [START ON 2023] dexamethasone, 2 mg, Q6H [address]   Followed by  Rita Hayes ON 2023] dexamethasone, 2 mg, Q8H   Followed by  [START ON 2023] dexamethasone, 2 mg, Q12H [address]   Followed by  Rita Hayes ON 2023] dexamethasone, 2 mg, Q24H  docusate sodium, 100 mg, BID  famotidine, 40 mg, Daily  folic acid, 1 mg, Daily  gabapentin, 100 mg, HS  hydrALAZINE, 10 mg, Once  insulin lispro, 1-5 Units, Q6H [address]  levETIRAcetam, 500 mg, Q12H NATIVIDAD  midazolam, 2 mg, Once  multivitamin-minerals, 1 tablet, Daily  nicotine, 7 mg, Daily  pantoprazole, 40 mg, Q24H NATIVIDAD  senna, 1 tablet, Daily  thiamine, 100 mg, Daily      acetaminophen, 325 mg, Q4H PRN  albuterol, 1 puff, Q4H PRN  albuterol, 2 5 mg, Q4H PRN  bisacodyl, 10 mg, Daily PRN  fentanyl citrate (PF), 50 mcg, Q1H PRN  hydrALAZINE, 10 mg, Q4H PRN  HYDROmorphone, 0 2 mg, Q2H PRN  ondansetron, 4 mg, Q6H PRN  oxyCODONE, 2 5 mg, Q4H PRN  oxyCODONE, 5 mg, Q4H PRN       Continuous    fentaNYL, 100 mcg/hr, Last Rate: 100 mcg/hr (05/24/23 0616)  norepinephrine, 1-30 mcg/min, Last Rate: 1 mcg/min (05/24/23 0343)  propofol, 5-50 mcg/kg/min, Last Rate: Stopped (05/23/23 1315)         Labs:    CBC    Recent Labs     05/23/23  0614 05/23/23  1036 05/23/23  1141   BANDSPCT 13*  --   --    HCT 35 1 32* 35   HGB 12 4 10 9* 11 9     --   --    WBC 19 38*  --   --      BMP    Recent Labs     05/23/23  1857 05/23/23  2355   AGAP 0* 1*   BUN 6 7   CALCIUM 8 1* 8 0*   * 133*   CO2 26 25   CREATININE 0 64 0 64   K 4 4 4 0   SODIUM 155* 159*       Coags    No recent results     Additional Electrolytes  Recent Labs     05/22/23  1526 05/23/23  0614 05/23/23  1036 05/23/23  1141   CAIONIZED  --   --  1 09* 1 04*   MG 1 6 2 2  --   --    PHOS 1 8* 2 8  --   --           Blood Gas    Recent Labs     05/23/23  1341   BEART -1 4   WEC2DPZ 22 8   AGG5VUA 36 6   PHART 7 412   PO2ART 145 1*   SOURCE Line, Arterial     Recent Labs     05/23/23  0016 05/23/23  1341   BEVEN -10 3  --    QQV4JOC 15 4*  --    AWI4RQH 33 7*  --    PHVEN 7 277*  --    PO2VEN 51 8*  --    SOURCE  --  Line, Arterial    LFTs  No recent results    Infectious  No recent results  Glucose  Recent Labs     05/23/23  0614 05/23/23  1337 05/23/23  1857 05/23/23  2355   GLUC 163* 172* 113 119               Critical Care Time Delivered: Upon my evaluation, this patient had a high probability of imminent or life-threatening deterioration due to s/p brain mets resection, which required my direct attention, intervention, and personal management  I have personally provided 30 minutes of critical care time, exclusive of procedures, teaching, family meetings, and any prior time recorded by providers other than myself       Aman Abraham

## 2023-05-24 NOTE — PHYSICAL THERAPY NOTE
Physical Therapy Cancellation Note         05/24/23 3245   PT Last Visit   PT Visit Date 05/24/23   Note Type   Note type Evaluation; Cancelled Session   Cancel Reasons Intubated/sedated     As discussed in inter-disciplinary rounds PT will sign off of patient at this time secondary to medical status  Please re-consult as medically appropriate  Thank you       RENALDO OCHOA PT, DPT

## 2023-05-24 NOTE — OCCUPATIONAL THERAPY NOTE
OT CANCEL NOTE    OT orders received  Chart reviewed  Pt is currently intubated/sedated and not appropriate to engage in skilled OT services at this time  Will D/C OT ;please re-consult once medically stable  05/24/23 0802   OT Last Visit   OT Visit Date 05/24/23   Note Type   Note type Evaluation; Cancelled Session   Cancel Reasons Intubated/sedated       Toshia Grimm MS, OTR/L

## 2023-05-25 NOTE — PLAN OF CARE
Problem: SAFETY,RESTRAINT: NV/NON-SELF DESTRUCTIVE BEHAVIOR  Goal: Remains free of harm/injury (restraint for non violent/non self-detsructive behavior)  Description: INTERVENTIONS:  - Instruct patient/family regarding restraint use   - Assess and monitor physiologic and psychological status   - Provide interventions and comfort measures to meet assessed patient needs   - Identify and implement measures to help patient regain control  - Assess readiness for release of restraint   Outcome: Progressing  Goal: Returns to optimal restraint-free functioning  Description: INTERVENTIONS:  - Assess the patient's behavior and symptoms that indicate continued need for restraint  - Identify and implement measures to help patient regain control  - Assess readiness for release of restraint   Outcome: Progressing     Problem: PAIN - ADULT  Goal: Verbalizes/displays adequate comfort level or baseline comfort level  Description: Interventions:  - Encourage patient to monitor pain and request assistance  - Assess pain using appropriate pain scale  - Administer analgesics based on type and severity of pain and evaluate response  - Implement non-pharmacological measures as appropriate and evaluate response  - Consider cultural and social influences on pain and pain management  - Notify physician/advanced practitioner if interventions unsuccessful or patient reports new pain  Outcome: Progressing     Problem: INFECTION - ADULT  Goal: Absence or prevention of progression during hospitalization  Description: INTERVENTIONS:  - Assess and monitor for signs and symptoms of infection  - Monitor lab/diagnostic results  - Monitor all insertion sites, i e  indwelling lines, tubes, and drains  - Monitor endotracheal if appropriate and nasal secretions for changes in amount and color  - [place] appropriate cooling/warming therapies per order  - Administer medications as ordered  - Instruct and encourage patient and family to use good hand hygiene technique  - Identify and instruct in appropriate isolation precautions for identified infection/condition  Outcome: Progressing  Goal: Absence of fever/infection during neutropenic period  Description: INTERVENTIONS:  - Monitor WBC    Outcome: Progressing     Problem: SAFETY ADULT  Goal: Patient will remain free of falls  Description: INTERVENTIONS:  - Educate patient/family on patient safety including physical limitations  - Instruct patient to call for assistance with activity   - Consult OT/PT to assist with strengthening/mobility   - Keep Call bell within reach  - Keep bed low and locked with side rails adjusted as appropriate  - Keep care items and personal belongings within reach  - Initiate and maintain comfort rounds  - Make Fall Risk Sign visible to staff  - Offer Toileting every 2 Hours, in advance of need  - Initiate/Maintain bed alarm  - Obtain necessary fall risk management equipment: bed alarm  - Apply yellow socks and bracelet for high fall risk patients  - Consider moving patient to room near nurses station  Outcome: Progressing  Goal: Maintain or return to baseline ADL function  Description: INTERVENTIONS:  -  Assess patient's ability to carry out ADLs; assess patient's baseline for ADL function and identify physical deficits which impact ability to perform ADLs (bathing, care of mouth/teeth, toileting, grooming, dressing, etc )  - Assess/evaluate cause of self-care deficits   - Assess range of motion  - Assess patient's mobility; develop plan if impaired  - Assess patient's need for assistive devices and provide as appropriate  - Encourage maximum independence but intervene and supervise when necessary  - Involve family in performance of ADLs  - Assess for home care needs following discharge   - Consider OT consult to assist with ADL evaluation and planning for discharge  - Provide patient education as appropriate  Outcome: Progressing  Goal: Maintains/Returns to pre admission functional level  Description: INTERVENTIONS:  - Perform BMAT or MOVE assessment daily    - Set and communicate daily mobility goal to care team and patient/family/caregiver  - Collaborate with rehabilitation services on mobility goals if consulted  - Perform Range of Motion 3 times a day  - Reposition patient every 2 hours  - Dangle patient 2 times a day  - Stand patient 2 times a day  - Ambulate patient 2 times a day  - Out of bed to chair 2 times a day   - Out of bed for meals 3 times a day  - Out of bed for toileting  - Record patient progress and toleration of activity level   Outcome: Progressing     Problem: DISCHARGE PLANNING  Goal: Discharge to home or other facility with appropriate resources  Description: INTERVENTIONS:  - Identify barriers to discharge w/patient and caregiver  - Arrange for needed discharge resources and transportation as appropriate  - Identify discharge learning needs (meds, wound care, etc )  - Arrange for interpretive services to assist at discharge as needed  - Refer to Case Management Department for coordinating discharge planning if the patient needs post-hospital services based on physician/advanced practitioner order or complex needs related to functional status, cognitive ability, or social support system  Outcome: Progressing     Problem: Knowledge Deficit  Goal: Patient/family/caregiver demonstrates understanding of disease process, treatment plan, medications, and discharge instructions  Description: Complete learning assessment and assess knowledge base    Interventions:  - Provide teaching at level of understanding  - Provide teaching via preferred learning methods  Outcome: Progressing     Problem: MOBILITY - ADULT  Goal: Maintain or return to baseline ADL function  Description: INTERVENTIONS:  -  Assess patient's ability to carry out ADLs; assess patient's baseline for ADL function and identify physical deficits which impact ability to perform ADLs (bathing, care of mouth/teeth, toileting, grooming, dressing, etc )  - Assess/evaluate cause of self-care deficits   - Assess range of motion  - Assess patient's mobility; develop plan if impaired  - Assess patient's need for assistive devices and provide as appropriate  - Encourage maximum independence but intervene and supervise when necessary  - Involve family in performance of ADLs  - Assess for home care needs following discharge   - Consider OT consult to assist with ADL evaluation and planning for discharge  - Provide patient education as appropriate  Outcome: Progressing  Goal: Maintains/Returns to pre admission functional level  Description: INTERVENTIONS:  - Perform BMAT or MOVE assessment daily    - Set and communicate daily mobility goal to care team and patient/family/caregiver  - Collaborate with rehabilitation services on mobility goals if consulted  - Perform Range of Motion 3 times a day  - Reposition patient every 2 hours    - Dangle patient 2 times a day  - Stand patient 2 times a day  - Ambulate patient 2 times a day  - Out of bed to chair 2 times a day   - Out of bed for meals 3 times a day  - Out of bed for toileting  - Record patient progress and toleration of activity level   Outcome: Progressing     Problem: Prexisting or High Potential for Compromised Skin Integrity  Goal: Skin integrity is maintained or improved  Description: INTERVENTIONS:  - Identify patients at risk for skin breakdown  - Assess and monitor skin integrity  - Assess and monitor nutrition and hydration status  - Monitor labs   - Assess for incontinence   - Turn and reposition patient  - Assist with mobility/ambulation  - Relieve pressure over bony prominences  - Avoid friction and shearing  - Provide appropriate hygiene as needed including keeping skin clean and dry  - Evaluate need for skin moisturizer/barrier cream  - Collaborate with interdisciplinary team   - Patient/family teaching  - Consider wound care consult   Outcome: Progressing Problem: Nutrition/Hydration-ADULT  Goal: Nutrient/Hydration intake appropriate for improving, restoring or maintaining nutritional needs  Description: Monitor and assess patient's nutrition/hydration status for malnutrition  Collaborate with interdisciplinary team and initiate plan and interventions as ordered  Monitor patient's weight and dietary intake as ordered or per policy  Utilize nutrition screening tool and intervene as necessary  Determine patient's food preferences and provide high-protein, high-caloric foods as appropriate       INTERVENTIONS:  - Monitor oral intake, urinary output, labs, and treatment plans  - Assess nutrition and hydration status and recommend course of action  - Evaluate amount of meals eaten  - Assist patient with eating if necessary   - Allow adequate time for meals  - Recommend/ encourage appropriate diets, oral nutritional supplements, and vitamin/mineral supplements  - Order, calculate, and assess calorie counts as needed  - Recommend, monitor, and adjust tube feedings and TPN/PPN based on assessed needs  - Assess need for intravenous fluids  - Provide specific nutrition/hydration education as appropriate  - Include patient/family/caregiver in decisions related to nutrition  Outcome: Progressing

## 2023-05-25 NOTE — ASSESSMENT & PLAN NOTE
POD2  from L frontotemporal craniotomy for tumor resection Lucius Moreno 5/23/2023)   · Patient presented with altered sensorium and generalized weakness for 1 month    Imaging:   • CT head 5/23/2023: Status post left-sided craniotomy for resection of 2 dominant metastatic lesions with expected postoperative changes  Small foci of hyperintensity within the left brainstem likely sequelae of previous seen uncal herniation  Small left superior cerebellar lacunar infarct new since prior  Grossly stable rightward midline shift with partial entrapment of the L lateral ventricle and transependymal flow  • Mri brain 5/23/2023: Expected postsurgical change from resection of left frontal lesion without evidence of residual tumor  Decreased edema in the left frontotemporal region  Stable midline shift  Remainder of lesions in the bilateral cerebral hemispheres and perilesional edema stable  Piter Scuddy lesional ischemia lining the resection cavity  Small infarcts in the bilateral occipital lobes and left cerebellar hemisphere  Stable extra-axial collection of gas and fluid subadjacent to the craniotomy  Stable partial effacement of the ventricles with transependymal flow  Plan:   · Continue to monitor neurological exam    · Recommend STAT CT head for decline in GCS >2 points in 1 hour   · Decadron taper as ordered   · Keppra 500mg BID x 7 days unless concern for seizure   · JULIUS drain removed 5/24/23   · Hold all AC/AP medication at this time  · Ongoing medical management and pain control per primary team  · Systolic BP <636 and MAP >58   · Palliative care consulted  · Na 149 - 3% stopped  · Monitor labs - Plt 127, INR 1 34  · Plan for PT/OT/SLT when appropriate   · DVT ppx: SCD  SQH  - can change to lovenox  · CM following for dispo planning  · Neurosurgery will continue  Call with questions or concerns

## 2023-05-25 NOTE — RESPIRATORY THERAPY NOTE
RT Ventilator Management Note  Sabas Weston 47 y o  female MRN: [id number]  Unit/Bed#: ICU 09 Encounter: [id number]      Daily Screen         5/24/2023  1923 5/25/2023  0817          Patient safety screen outcome[de-identified] Failed Failed (P)       Not Ready for Weaning due to[de-identified] Underline problem not resolved Underline problem not resolved (P)                 Physical Exam:   Assessment Type: (P) During-treatment  General Appearance: (P) Sedated  Respiratory Pattern: (P) Assisted  Chest Assessment: (P) Chest expansion symmetrical  Bilateral Breath Sounds: (P) Clear, Diminished  O2 Device: vent      Resp Comments: (P) Pt resting on cmv settings no sbt performed at this time will continue tomonitor

## 2023-05-25 NOTE — RESPIRATORY THERAPY NOTE
RT Ventilator Management Note  Anne Arthur 47 y o  female MRN: [id number]  Unit/Bed#: ICU 09 Encounter: [id number]      Daily Screen         5/24/2023  0834 5/24/2023 1923          Patient safety screen outcome[de-identified] Passed Failed      Not Ready for Weaning due to[de-identified] -- Underline problem not resolved      Spont breathing trial % for 30 min: Yes --      Spont breathing trial outcome[de-identified] Failed --      Spont breathing trial reason failed: Hemodynamic unstable --      Previous settings resumed: Yes --                Physical Exam:   Assessment Type: (P) Assess only  General Appearance: (P) Sedated  Respiratory Pattern: (P) Shallow, Assisted  Chest Assessment: (P) Chest expansion symmetrical  Bilateral Breath Sounds: (P) Diminished, Clear  O2 Device: vent      Resp Comments: (P) Pt  remains on CMV, tolerating well  Will con tinue to monitor resp status

## 2023-05-25 NOTE — PROGRESS NOTES
[facility]  Progress Note  Name: Paulina Ayala  MRN: [id number]  Unit/Bed#: ICU 09 I Date of Admission: 5/21/2023   Date of Service: 5/25/2023 I Hospital Day: 4    Assessment/Plan   * Brain mass  Assessment & Plan  POD2  from L frontotemporal craniotomy for tumor resection Alta Li 5/23/2023)   · Patient presented with altered sensorium and generalized weakness for 1 month    Imaging:   • CT head 5/23/2023: Status post left-sided craniotomy for resection of 2 dominant metastatic lesions with expected postoperative changes  Small foci of hyperintensity within the left brainstem likely sequelae of previous seen uncal herniation  Small left superior cerebellar lacunar infarct new since prior  Grossly stable rightward midline shift with partial entrapment of the L lateral ventricle and transependymal flow  • Mri brain 5/23/2023: Expected postsurgical change from resection of left frontal lesion without evidence of residual tumor  Decreased edema in the left frontotemporal region  Stable midline shift  Remainder of lesions in the bilateral cerebral hemispheres and perilesional edema stable  Ynes Mims lesional ischemia lining the resection cavity  Small infarcts in the bilateral occipital lobes and left cerebellar hemisphere  Stable extra-axial collection of gas and fluid subadjacent to the craniotomy  Stable partial effacement of the ventricles with transependymal flow  Plan:   · Continue to monitor neurological exam    · Recommend STAT CT head for decline in GCS >2 points in 1 hour   · Decadron taper as ordered   · Keppra 500mg BID x 7 days unless concern for seizure   · JULIUS drain removed 5/24/23   · Hold all AC/AP medication at this time  · Ongoing medical management and pain control per primary team  · Systolic BP <527 and MAP >83   · Palliative care consulted  · Na 149 - 3% stopped  · Monitor labs - Plt 127, INR 1 34  · Plan for PT/OT/SLT when appropriate   · DVT ppx: SCD  "SQH - can change to lovenox  · CM following for dispo planning  · Neurosurgery will continue  Call with questions or concerns  Subjective/Objective   Chief Complaint: postoperative follow-up    Subjective: no significant events overnight  Objective: lying in bed  NAD  I/O       05/23 0701 05/24 0700 05/24 0701 05/25 0700 05/25 0701 05/26 0700    I V  (mL/kg) 2471 5 (60 6) 150 5 (3 7) 20 (0 5)    NG/ 880     IV Piggyback 720 200     Feedings  640 80    Total Intake(mL/kg) 3311 5 (81 2) 1870 5 (45 8) 100 (2 5)    Urine (mL/kg/hr) 4700 (4 8) 1220 (1 2) 200 (1 1)    Emesis/NG output  0     Drains 90 40     Blood 75      Total Output 4865 1260 200    Net -1553 5 +610 5 -100                 Invasive Devices     Central Venous Catheter Line  Duration           CVC Central Lines 05/22/23 Triple 16cm 2 days          Peripheral Intravenous Line  Duration           Peripheral IV 05/22/23 Left Forearm 2 days          Drain  Duration           Urethral Catheter Temperature probe 16 Fr  3 days    NG/OG/Enteral Tube Orogastric 18 Fr Right mouth 2 days          Airway  Duration           ETT  Cuffed 2 days                Physical Exam:  Vitals: Blood pressure 142/78, pulse 66, temperature 99 °F (37 2 °C), resp  rate 12, height 4' 11\" (1 499 m), weight 40 8 kg (90 lb 0 1 oz), last menstrual period 06/02/2019, SpO2 99 %  ,Body mass index is 18 18 kg/m²      Hemodynamic Monitoring: MAP: Arterial Line MAP (mmHg): 108 mmHg     General appearance: appears stated age and no distress  Head: incision CDI  Eyes: upward gaze, PERRL 2-3mm  Neck: supple, symmetrical, trachea midline   Lungs: intubated/ventilated  Heart: regular heart rate  Neurologic:   Mental status: GCS 6T, E1, V1T, M4  +corneal b/l and cough reflex  Cranial nerves: limited 2/2 mental status  Sensory: responds to pain x 4  Motor: no spontaneous movement, BLE w/d, mild ext BUE  Reflexes: no clonus    Lab Results:  Results from last 7 days   Lab Units " "05/25/23  0553 05/24/23  0558 05/23/23  1141 05/23/23  1036 05/23/23  0614 05/21/23  1705   EOS PCT % 0  --   --   --  0 1   HEMATOCRIT % 31 0* 33 4*  --   --  35 1 44 9   HEMATOCRIT, ISTAT %  --   --  35   < >  --   --    HEMOGLOBIN g/dL 9 9* 10 8*  --   --  12 4 15 4   I STAT HEMOGLOBIN g/dl  --   --  11 9   < >  --   --    MONOS PCT % 9  --   --   --   --  12   MONO PCT %  --   --   --   --  3*  --    NEUTROS PCT % 86*  --   --   --   --  78*   PLATELETS Thousands/uL 127* 187  --   --  247 232   WBC Thousand/uL 9 53 11 05*  --   --  19 38* 8 28    < > = values in this interval not displayed  Results from last 7 days   Lab Units 05/25/23  0553 05/25/23  0011 05/24/23  2221 05/23/23  1337 05/23/23  1141 05/23/23  1036 05/22/23  1526 05/21/23  1705   ALK PHOS U/L  --   --  52  --   --   --   --  66   ALT U/L  --   --  8*  --   --   --   --  9   AST U/L  --   --  34  --   --   --   --  22   BUN mg/dL 15 15 14   < >  --   --    < > 25   CALCIUM mg/dL 8 5 8 0* 7 8*   < >  --   --    < > 9 8   CHLORIDE mmol/L 123* 126* 126*   < >  --   --    < > 102   CO2 mmol/L 28 26 26   < >  --   --    < > 21   CO2, I-STAT mmol/L  --   --   --   --  25 25   < >  --    CREATININE mg/dL 0 57* 0 70 0 74   < >  --   --    < > 0 56*   GLUCOSE, ISTAT mg/dl  --   --   --   --  284* 238*  --   --    POTASSIUM mmol/L 4 2 4 2 4 0   < >  --   --    < > 3 5    < > = values in this interval not displayed       Results from last 7 days   Lab Units 05/25/23  0553 05/24/23  0558 05/23/23  0614   MAGNESIUM mg/dL 1 9 1 9 2 2     Results from last 7 days   Lab Units 05/25/23  0553 05/24/23  0558 05/23/23  0614   PHOSPHORUS mg/dL 2 0* 4 4 2 8     Results from last 7 days   Lab Units 05/25/23  0553 05/24/23  0558 05/21/23  1705   INR  1 34* 1 40* 1 16   PTT seconds  --  29 31     No results found for: \"TROPONINT\"  ABG:  Lab Results   Component Value Date    BEART -1 4 05/23/2023    AJC7GOG 22 8 05/23/2023    WRO4WML 36 6 05/23/2023    PHART 7 412 " 05/23/2023    PO2ART 145 1 (H) 05/23/2023    SOURCE Line, Arterial 05/23/2023       Imaging Studies: I have personally reviewed pertinent reports  and I have personally reviewed pertinent films in PACS    CT head wo contrast    Result Date: 5/23/2023  Impression: Status post left-sided craniotomy for the resection of 2 dominant metastatic lesions with expected postoperative hemorrhage and pneumocephalus in the operative bed  Small foci of Duret type hemorrhage within the left brainstem likely sequela of previously seen uncal herniation, which has improved  Findings appear new since prior CT and grossly stable since recent MRI  Note is also made of a new left superior cerebellar lacunar infarct, new since prior exams  Grossly stable rightward midline shift with partial entrapment of the right lateral ventricle and transependymal flow of CSF  The study was marked in [facility] for immediate notification  Workstation performed: [id number]     XR chest portable    Result Date: 5/23/2023  Impression: Background emphysema  Known left lower lobe mass overlies the left hilum  No pneumothorax after right IJ central venous catheter placement  Workstation performed: [id number]     MRI brain w wo contrast    Result Date: 5/23/2023  Impression: Multiple enhancing lesions identified within the brain parenchyma consistent with metastatic disease  The largest lesions are seen within the left hemisphere involving the insular region and anterior left frontal lobe with irregular peripheral enhancement and extensive surrounding vasogenic edema  Multiple additional smaller lesions are scattered within the brain parenchyma  The edema and mass effect results in approximately 8 mm of left to right shift with partial effacement of the suprasellar cistern   Workstation performed: [id number]     CT head wo contrast    Result Date: 5/22/2023  Impression: Metastatic disease with multiple intracranial lesions with surrounding vasogenic edema with subfalcine herniation, uncal herniation and left-to-right shift of about 1 5 cm with stable midline shift and mass effect High attenuation seen within the left insular cistern, unchanged due to perilesional hemorrhage or leptomeningeal disease or sulcal/extra-axial hemorrhage, stable I personally discussed this study with LYRIC AMARO on 5/22/2023 5:04 PM  Workstation performed: [id number]     CT chest abdomen pelvis w contrast    Result Date: 5/22/2023  Impression: 3 x 2 8 x 2 6 cm mass superior segment of the left lower lobe highly suspicious for primary lung cancer  Additional scattered bilateral subcentimeter nodules are indeterminate  Upper lobe predominant emphysema  No metastatic disease in the abdomen or pelvis  The study was marked in [facility] for immediate notification  Workstation performed: [id number]     XR hip/pelv 2-3 vws right    Result Date: 5/22/2023  Impression: No acute osseous abnormality  Workstation performed: [id number]     XR chest 1 view portable    Result Date: 5/22/2023  Impression: Right middle lobe opacity may represent pneumonia or atelectasis  Left hilar mass  The study was marked in [facility] for immediate notification    Workstation performed: [id number]     CT head without contrast    Result Date: 5/21/2023  Impression: Multiple (at least 4) intracranial mass lesions as above including dominant left frontal/basal ganglia lesions with significant associated vasogenic edema in the left greater than right hemispheres highly concerning for metastases  Significant associated  mass effect including 1 cm rightward subfalcine herniation  Nonspecific marginal hyperattenuation suspicious for small amount of perilesional hemorrhage  Associated ventricular entrapment/obstructive hydrocephalus  Urgent neurosurgery consult and follow-up gadolinium-enhanced MRI of the brain advised  Above findings discussed with Dr Karthik Mcneill at 6:30 p m  on 5/21/2023   Workstation performed: [id number]     VTE Pharmacologic Prophylaxis: Heparin    VTE Mechanical Prophylaxis: sequential compression device

## 2023-05-25 NOTE — PLAN OF CARE
Problem: PAIN - ADULT  Goal: Verbalizes/displays adequate comfort level or baseline comfort level  Description: Interventions:  - Encourage patient to monitor pain and request assistance  - Assess pain using appropriate pain scale  - Administer analgesics based on type and severity of pain and evaluate response  - Implement non-pharmacological measures as appropriate and evaluate response  - Consider cultural and social influences on pain and pain management  - Notify physician/advanced practitioner if interventions unsuccessful or patient reports new pain  Outcome: Progressing     Problem: INFECTION - ADULT  Goal: Absence or prevention of progression during hospitalization  Description: INTERVENTIONS:  - Assess and monitor for signs and symptoms of infection  - Monitor lab/diagnostic results  - Monitor all insertion sites, i e  indwelling lines, tubes, and drains  - Monitor endotracheal if appropriate and nasal secretions for changes in amount and color  - [place] appropriate cooling/warming therapies per order  - Administer medications as ordered  - Instruct and encourage patient and family to use good hand hygiene technique  - Identify and instruct in appropriate isolation precautions for identified infection/condition  Outcome: Progressing  Goal: Absence of fever/infection during neutropenic period  Description: INTERVENTIONS:  - Monitor WBC    Outcome: Progressing     Problem: SAFETY ADULT  Goal: Patient will remain free of falls  Description: INTERVENTIONS:  - Educate patient/family on patient safety including physical limitations  - Instruct patient to call for assistance with activity   - Consult OT/PT to assist with strengthening/mobility   - Keep Call bell within reach  - Keep bed low and locked with side rails adjusted as appropriate  - Keep care items and personal belongings within reach  - Initiate and maintain comfort rounds  - Make Fall Risk Sign visible to staff  - Offer Toileting every  Hours, in advance of need  - Initiate/Maintain alarm  - Obtain necessary fall risk management equipment:   - Apply yellow socks and bracelet for high fall risk patients  - Consider moving patient to room near nurses station  Outcome: Progressing  Goal: Maintain or return to baseline ADL function  Description: INTERVENTIONS:  -  Assess patient's ability to carry out ADLs; assess patient's baseline for ADL function and identify physical deficits which impact ability to perform ADLs (bathing, care of mouth/teeth, toileting, grooming, dressing, etc )  - Assess/evaluate cause of self-care deficits   - Assess range of motion  - Assess patient's mobility; develop plan if impaired  - Assess patient's need for assistive devices and provide as appropriate  - Encourage maximum independence but intervene and supervise when necessary  - Involve family in performance of ADLs  - Assess for home care needs following discharge   - Consider OT consult to assist with ADL evaluation and planning for discharge  - Provide patient education as appropriate  Outcome: Progressing  Goal: Maintains/Returns to pre admission functional level  Description: INTERVENTIONS:  - Perform BMAT or MOVE assessment daily    - Set and communicate daily mobility goal to care team and patient/family/caregiver  - Collaborate with rehabilitation services on mobility goals if consulted  - Perform Range of Motion  times a day  - Reposition patient every  hours    - Dangle patient  times a day  - Stand patient  times a day  - Ambulate patient  times a day  - Out of bed to chair  times a day   - Out of bed for meals  times a day  - Out of bed for toileting  - Record patient progress and toleration of activity level   Outcome: Progressing     Problem: DISCHARGE PLANNING  Goal: Discharge to home or other facility with appropriate resources  Description: INTERVENTIONS:  - Identify barriers to discharge w/patient and caregiver  - Arrange for needed discharge resources and transportation as appropriate  - Identify discharge learning needs (meds, wound care, etc )  - Arrange for interpretive services to assist at discharge as needed  - Refer to Case Management Department for coordinating discharge planning if the patient needs post-hospital services based on physician/advanced practitioner order or complex needs related to functional status, cognitive ability, or social support system  Outcome: Progressing     Problem: Knowledge Deficit  Goal: Patient/family/caregiver demonstrates understanding of disease process, treatment plan, medications, and discharge instructions  Description: Complete learning assessment and assess knowledge base  Interventions:  - Provide teaching at level of understanding  - Provide teaching via preferred learning methods  Outcome: Progressing     Problem: MOBILITY - ADULT  Goal: Maintain or return to baseline ADL function  Description: INTERVENTIONS:  -  Assess patient's ability to carry out ADLs; assess patient's baseline for ADL function and identify physical deficits which impact ability to perform ADLs (bathing, care of mouth/teeth, toileting, grooming, dressing, etc )  - Assess/evaluate cause of self-care deficits   - Assess range of motion  - Assess patient's mobility; develop plan if impaired  - Assess patient's need for assistive devices and provide as appropriate  - Encourage maximum independence but intervene and supervise when necessary  - Involve family in performance of ADLs  - Assess for home care needs following discharge   - Consider OT consult to assist with ADL evaluation and planning for discharge  - Provide patient education as appropriate  Outcome: Progressing  Goal: Maintains/Returns to pre admission functional level  Description: INTERVENTIONS:  - Perform BMAT or MOVE assessment daily    - Set and communicate daily mobility goal to care team and patient/family/caregiver     - Collaborate with rehabilitation services on mobility goals if consulted  - Perform Range of Motion  times a day  - Reposition patient every hours  - Dangle patient  times a day  - Stand patient  times a day  - Ambulate patient  times a day  - Out of bed to chair  times a day   - Out of bed for meals  times a day  - Out of bed for toileting  - Record patient progress and toleration of activity level   Outcome: Progressing     Problem: Prexisting or High Potential for Compromised Skin Integrity  Goal: Skin integrity is maintained or improved  Description: INTERVENTIONS:  - Identify patients at risk for skin breakdown  - Assess and monitor skin integrity  - Assess and monitor nutrition and hydration status  - Monitor labs   - Assess for incontinence   - Turn and reposition patient  - Assist with mobility/ambulation  - Relieve pressure over bony prominences  - Avoid friction and shearing  - Provide appropriate hygiene as needed including keeping skin clean and dry  - Evaluate need for skin moisturizer/barrier cream  - Collaborate with interdisciplinary team   - Patient/family teaching  - Consider wound care consult   Outcome: Progressing     Problem: Nutrition/Hydration-ADULT  Goal: Nutrient/Hydration intake appropriate for improving, restoring or maintaining nutritional needs  Description: Monitor and assess patient's nutrition/hydration status for malnutrition  Collaborate with interdisciplinary team and initiate plan and interventions as ordered  Monitor patient's weight and dietary intake as ordered or per policy  Utilize nutrition screening tool and intervene as necessary  Determine patient's food preferences and provide high-protein, high-caloric foods as appropriate       INTERVENTIONS:  - Monitor oral intake, urinary output, labs, and treatment plans  - Assess nutrition and hydration status and recommend course of action  - Evaluate amount of meals eaten  - Assist patient with eating if necessary   - Allow adequate time for meals  - Recommend/ encourage appropriate diets, oral nutritional supplements, and vitamin/mineral supplements  - Order, calculate, and assess calorie counts as needed  - Recommend, monitor, and adjust tube feedings and TPN/PPN based on assessed needs  - Assess need for intravenous fluids  - Provide specific nutrition/hydration education as appropriate  - Include patient/family/caregiver in decisions related to nutrition  Outcome: Progressing

## 2023-05-25 NOTE — PROGRESS NOTES
Daily Progress Note - Critical Care   [place] Nannette 47 y o  female MRN: [id number]  Unit/Bed#: ICU 09 Encounter: [id number]    ----------------------------------------------------------------------------------------  Overnight Events:  - A-line exchange attempted, unsuccessful  A-line removed  - 24-hour heart rate:   - 24-hour respiratory rate: 13-20  - 24-hour blood pressure: 112//82  - 24-hour MAP 82/111  - Serum Osm 05/25 0553: 323  ---------------------------------------------------------------------------------------  SUBJECTIVE  Patient remains intubated on SCMV 14/350/6/40, on fentanyl 50 mcg/h  Heart rate 127, blood pressure 131/70, MAP 92, SPO2 100%, respiratory rate 14, temperature 99 5 °F   The patient was receiving tube feeds Jevity 1 2 at 40 mL/h, every 4 hours 100 mL flushes  The patient was not arousable to vocal, tactile stimuli  On examination pupil demonstrated intact right pupillary reflex, and inconsistent left pupillary reflex  Bilateral corneal reflexes intact  VOR negative  Cough reflex present  Gag reflex absent  There was no volitional movement seen in any of the 4 extremities  The patient had increased tone on the left upper and left lower extremity in comparison to the right upper and right lower extremity respectively  The patient demonstrated withdrawal in all 4 extremities with the left upper and left lower demonstrating a great response in comparison to the right upper and right lower extremity respectively  Reflexes in bilateral upper extremities were 2+, reflexes in bilateral lower extremities were 2+, no clonus demonstrated bilaterally, upgoing toes bilaterally       Review of Systems   Unable to perform ROS: Intubated     Review of systems was unable to be performed secondary to patient being intubated  ---------------------------------------------------------------------------------------  Assessment and Plan:  Neuro:   • Diagnosis: Brain metastases  • To review:   o Patient presented with a 1 month history of waxing and waning weakness and 1 week of worsening confusion that led to admission  o May 21: CT head demonstrated multiple (at least 4) intracranial mass lesions including dominant left frontal/basal ganglia lesions with significant associated vasogenic edema in the left greater than the right hemispheres highly concerning for metastasis  Significant associated mass effect including 1 cm rightward subfalcine herniation  Nonspecific marginal-Per attenuation suspicious for small amount of perilesional hemorrhage  Associated ventricular entrapment/obstructive hydrocephalus  o May 21: CT chest abdomen pelvis demonstrated a mass superior segment of the left lobe of the lung highly suspicious for primary lung cancer  Scattered bilateral subcentimeter nodules are indeterminate  Upper lobe predominant emphysema  No metastatic disease in the abdomen and pelvis  o Patient subsequently  dexamethasone 4 mg every 6 hours and started on levetiracetam 500 mg twice daily  o May 22: Mental status and neuro examination worsened, there was suspicion for worsening herniation/increased intracranial pressure, patient required CBC and intubation, received 75 g mannitol, and hypertonic saline  Point-of-care ultrasound of the optic nerve sheath demonstrated bilateral more than 5 mm concerning for increased ICP  o May 23: MRI brain with and without contrast demonstrated multiple enhancing lesions within the brain parenchyma consistent with metastatic disease  The largest lesions are seen within the left hemisphere involving the insular region and anterior left frontal lobe with irregular peripheral enhancement and extensive surrounding vasogenic edema  Multiple additional small lesions are scattered within the brain parenchyma    The edema and mass effect resulted in approximately 8 mm of left-to-right shift with partial effacement of the suprasellar cistern     o Osmolar gap noted to be 15, underwent left frontal craniotomy for tumor resection, 2 large metastatic lesions resected, first and sections consistent with metastatic carcinoma  o May 23: Postop MRI demonstrated postsurgical changes with no residual tumor  Focal infarct along the left frontal resection cavity and small infarct in bilateral occipital lobes and left cerebellar hemisphere  Stable left to right midline shift of 8 mm  Decreased vasogenic edema  Remainder of lesions and edema stable   o May 24: Serum sodium noted to be 160, serum osmolarity noted to be 335  o Plan:  - Continue levetiracetam 500 mg twice daily day 5/7  - Continue dexamethasone taper as ordered 4 mg every 6 hours 5/24  - Head of bed elevated to more than 30 degrees  - Continue mild sedation  - Continue to monitor serum osmolarity and serum sodium; hypertonic saline discontinued  - Maintain euthymia  • Diagnosis: Sedation  o Plan:  - Fentanyl 50 mcg/h  - Wean as tolerated  - As needed oxycodone and hydromorphone  • Diagnosis: Alcohol abuse  o Plan:  - CIWA protocol discontinued  - Currently intubated mechanically ventilated  - Continue thiamine and folate  • Diagnosis: Depression  o Plan:  - Paxil 10 mg daily at home; restarted 05/25  • Diagnosis: Tobacco use  o Plan:  - Continue NicoDerm patch    CV:   • Diagnosis: No active issues  • May 23 echocardiogram: Left ventricular ejection fraction 70%, increased wall thickness, cannot exclude regional wall motion abnormality due to difficult study      Pulm:  • Diagnosis: Mechanical ventilation  o Plan:  - VC-AC 14/350/6/40  - Plan for SAT/SBT  • Diagnosis: COPD  o Home meds:  - Trelegy Ellipta 100, 1 inhalation daily  - Albuterol rescue inhaler 2 inhalations 4 times a day as needed  - Fluticasone nasal spray 2 sprays in each nostril daily  - Astelin nasal spray 2 sprays in each nostril twice daily  o Plan:  - Continue ipratropium 0 5 mg neb 3 times daily  - Levalbuterol 1 25 mg neb 3 times daily    GI:   • Diagnosis: GERD  o On Protonix at home  o Plan: Continue omeprazole 20 mg daily  • Diagnosis: Opiate induced constipation  o Plan:  - Senokot S PO BID  - Dulcolax rectal suppository 10-mg QD PRN    :   • Diagnosis: No active issues  • Continue to monitor renal function  o May 25: BUN 15, creatinine 0 57      F/E/N:   • Fluids: No active IVF  • Electrolytes: Continue to monitor and replete  o K>4, Mag>2, Phos>2 5  o Continue to monitor hypernatremia, goal 145-155  o Continue to monitor serum osmolarity, goal 310-320  • Nutrition: Jevity 1 2, 40 mL an hour      Heme/Onc:   • Diagnosis: Leukocytosis  o Plan:   - May 25, resolved, 9 53  • Diagnosis: Macrocytic anemia  o Plan:   - May 25, hemoglobin 9 9, hematocrit 31 0  - May 24, hemoglobin 10 8, hematocrit 33 4  - May 23, hemoglobin 12 4, hematocrit 35 1  - Continue to monitor CBC and differential daily, transfuse if patient becomes symptomatic, or hemoglobin falls below 7 0    Endo:   • Diagnosis: No active issues    ID:   • Diagnosis: Abnormal urinalysis  o Plan:  - Microscopic urinalysis demonstrated positive nitrite, negative leukocytes  Patient met SIRS criteria on 5-22 with temperature less than 36 Celsius, respiratory rate more than 20  Leukocytosis of 19 38 on 5/23, bandemia of 13%  Started IV ceftriaxone 1 g on 5/23, did not receive first dose  Blood cultures negative x2  MRSA culture discontinued due to improper collection  - Repeat MRSA culture 05/25  - Continue IV ceftriaxone day 2 of 3  - Trend CBC and differential  - Trend fever curve    MSK/Skin:   • Diagnosis: Right hip pain  o Plan:  - Patient's family, patient has been having right hip pain for the past year  Patient seen by medicine, patient declined steroid injections because afraid of needles    5/21 x-ray of hip and pelvis, right, demonstrated no lytic blastic osseous lesions, no acute osseous abnormality  - Continue to monitor    DVT PPX:  - Mechanical: bilateral SCDs  - Chemical: Heparin 5000U Q8H SC    Disposition: Continue Critical Care   Code Status: Level 1 - Full Code  ---------------------------------------------------------------------------------------  ICU CORE MEASURES  Prophylaxis   VTE Pharmacologic Prophylaxis: Heparin  VTE Mechanical Prophylaxis: sequential compression device  Stress Ulcer Prophylaxis: Omeprazole PO    ABCDE Protocol (if indicated)  Plan to perform spontaneous awakening trial today? Yes  Plan to perform spontaneous breathing trial today? Yes  Obvious barriers to extubation? Yes  CAM-ICU: Positive    Invasive Devices Review  Invasive Devices     Central Venous Catheter Line  Duration           CVC Central Lines 05/22/23 Triple 16cm 2 days          Peripheral Intravenous Line  Duration           Peripheral IV 05/22/23 Left Forearm 2 days          Drain  Duration           Urethral Catheter Temperature probe 16 Fr  3 days    NG/OG/Enteral Tube Orogastric 18 Fr Right mouth 2 days          Airway  Duration           ETT  Cuffed 2 days              Can any invasive devices be discontinued today? No (provide explanation): Continued critical care intervention   ---------------------------------------------------------------------------------------  OBJECTIVE  Physical Exam  Vitals and nursing note reviewed  Eyes:      General:         Right eye: No discharge  Left eye: No discharge  Cardiovascular:      Rate and Rhythm: Normal rate  Heart sounds: Murmur heard  Abdominal:      General: There is no distension  Musculoskeletal:      Cervical back: No rigidity  Right lower leg: No edema  Left lower leg: No edema  Skin:     General: Skin is warm and dry  Neurological:      Mental Status: She is alert         Vitals   Vitals:    05/25/23 0900 05/25/23 1000 05/25/23 1100 05/25/23 1240   BP: 141/68 127/66 142/78    BP Location:       Pulse: 84 76 66    Resp: (!) 24 20 12    Temp: 99 3 °F (37 4 °C) 99 3 °F (37 4 °C) 99 °F (37 2 °C) "  TempSrc:       SpO2: 100% 100% 99% 98%   Weight:       Height:         Temp (24hrs), Av 4 °F (37 4 °C), Min:98 6 °F (37 °C), Max:99 7 °F (37 6 °C)  Current: Temperature: 99 °F (37 2 °C)    Respiratory:  SpO2: SpO2: 98 %, SpO2 Activity: SpO2 Activity: At Rest, SpO2 Device: O2 Device: Ventilator, Capnography:         Invasive/non-invasive ventilation settings   Respiratory    Lab Data (Last 4 hours)    None         O2/Vent Data (Last 4 hours)    None              Height and Weights   Height: 4' 11\" (149 9 cm)  IBW (Ideal Body Weight): 43 2 kg  Body mass index is 18 18 kg/m²  Weight (last 2 days)     Date/Time Weight    23 0600 40 8 (90 01)    23 1610 40 8 (90)    23 0600 40 9 (90 17)        Intake and Output  I/O        0701   0700  0701   0700    I V  (mL/kg) 2471 5 (60 6) 140 5 (3 4)    NG/ 880    IV Piggyback 720 200    Feedings  560    Total Intake(mL/kg) 3311 5 (81 2) 1780 5 (43 6)    Urine (mL/kg/hr) 4700 (4 8) 1090 (1 1)    Emesis/NG output  0    Drains 90 40    Blood 75     Total Output 4865 1130    Net -1553 5 +650 5              Nutrition       Diet Orders   (From admission, onward)             Start     Ordered    23 1148  Diet Enteral/Parenteral; Tube Feeding No Oral Diet; Jevity 1 2 Yevgeniy; Continuous; 40; Water  (Order Panel)  Diet effective midnight        Comments: Start at [employer] and increase by 10cc every 4hrs until goal of 40cc/hr   References:    Adult Nutrition Support Algorithm    RD Therapeutic Diet Order Protocol   Question Answer Comment   Diet Type Enteral/Parenteral    Enteral/Parenteral Tube Feeding No Oral Diet    Tube Feeding Formula: Jevity 1 2 Yevgeniy    Bolus/Cyclic/Continuous Continuous    Tube Feeding Goal Rate (mL/hr): 40    Water Flush type: Water    RD to adjust diet per protocol?  Yes        23 1148              Laboratory and Diagnostics:  Results from last 7 days   Lab Units 23  0553 23  0558 23  1141 " 05/23/23  1036 05/23/23  0614 05/21/23  1705   BANDS PCT %  --   --   --   --  13*  --    EOS PCT % 0  --   --   --  0 1   HEMATOCRIT % 31 0* 33 4*  --   --  35 1 44 9   HEMATOCRIT, ISTAT %  --   --  35 32*  --   --    HEMOGLOBIN g/dL 9 9* 10 8*  --   --  12 4 15 4   I STAT HEMOGLOBIN g/dl  --   --  11 9 10 9*  --   --    MONOS PCT % 9  --   --   --   --  12   MONO PCT %  --   --   --   --  3*  --    NEUTROS PCT % 86*  --   --   --   --  78*   PLATELETS Thousands/uL 127* 187  --   --  247 232   WBC Thousand/uL 9 53 11 05*  --   --  19 38* 8 28     Results from last 7 days   Lab Units 05/25/23  0553 05/25/23  0011 05/24/23  2221 05/24/23  1849 05/24/23  1446 05/24/23  0558 05/23/23  2355 05/22/23  1526 05/21/23  1705   ANION GAP mmol/L -2* 0* 1* 3* 0* 0* 1*   < > 16*   ALBUMIN g/dL  --   --  2 7*  --   --  2 7*  --   --  4 3   ALK PHOS U/L  --   --  52  --   --   --   --   --  66   ALT U/L  --   --  8*  --   --   --   --   --  9   AST U/L  --   --  34  --   --   --   --   --  22   BUN mg/dL 15 15 14 13 12 9 7   < > 25   CALCIUM mg/dL 8 5 8 0* 7 8* 8 3 8 3 7 9* 8 0*   < > 9 8   CHLORIDE mmol/L 123* 126* 126* 125* 128* 134* 133*   < > 102   CO2 mmol/L 28 26 26 26 26 26 25   < > 21   CO2, I-STAT   --   --   --   --   --   --   --    < >  --    CREATININE mg/dL 0 57* 0 70 0 74 0 63 0 64 0 65 0 64   < > 0 56*   GLUCOSE RANDOM mg/dL 176* 176* 199* 120 132 119 119   < > 84   POTASSIUM mmol/L 4 2 4 2 4 0 3 5 3 4* 3 8 4 0   < > 3 5   SODIUM mmol/L 149* 152* 153* 154* 154* 160* 159*   < > 139   TOTAL BILIRUBIN mg/dL  --   --  0 16*  --   --   --   --   --  0 54    < > = values in this interval not displayed       Results from last 7 days   Lab Units 05/25/23  0553 05/24/23  0558 05/23/23  0614 05/22/23  1526 05/21/23  1705   MAGNESIUM mg/dL 1 9 1 9 2 2 1 6 1 3*   PHOSPHORUS mg/dL 2 0* 4 4 2 8 1 8* 3 1      Results from last 7 days   Lab Units 05/25/23  0553 05/24/23  0558 05/21/23  1705   INR  1 34* 1 40* 1 16   PTT seconds  -- 29 31          Results from last 7 days   Lab Units 05/21/23  1705   LACTIC ACID mmol/L 0 7     ABG:  Results from last 7 days   Lab Units 05/23/23  1341   BASE EXC ART mmol/L -1 4   HCO3 ART mmol/L 22 8   PCO2 ART mm Hg 36 6   PH ART  7 412   PO2 ART mm Hg 145 1*   ABG SOURCE  Line, Arterial     VBG:  Results from last 7 days   Lab Units 05/23/23  1341 05/23/23  0016   BASE EXC XIMENA mmol/L  --  -10 3   HCO3 XIMENA mmol/L  --  15 4*   PCO2 XIMENA mm Hg  --  33 7*   PH XIMENA   --  7 277*   PO2 XIMENA mm Hg  --  51 8*   ABG SOURCE  Line, Arterial  --            Micro  Results from last 7 days   Lab Units 05/22/23  0027 05/21/23  1735   BLOOD CULTURE  No Growth at 72 hrs  No Growth at 72 hrs  No Growth at 72 hrs  No Growth at 72 hrs  EKG: Continue cardiopulmonary monitoring  Imaging: I have personally reviewed pertinent reports     and I have personally reviewed pertinent films in PACS    Active Medications  Scheduled Meds:  Current Facility-Administered Medications   Medication Dose Route Frequency Provider Last Rate   • acetaminophen  325 mg Rectal Q4H PRN Patricia Dial PA-C     • albuterol  1 puff Inhalation Q4H PRN Patricia Dial PA-C     • albuterol  2 5 mg Nebulization Q4H PRN Patricia Dial PA-C     • bisacodyl  10 mg Rectal Daily PRN Dixie Dial PA-C     • cefTRIAXone  1,000 mg Intravenous Q24H Anushka Machado MD 1,000 mg (05/24/23 1833)   • chlorhexidine  15 mL Mouth/Throat Q12H [address], [place]     • chlorhexidine  15 mL Swish [employer] Spit Once [employer] [facility]     • dexamethasone  4 mg Intravenous Q6H [address] Patricia Dial PA-C      Followed by   • [START ON 5/26/2023] dexamethasone  4 mg Intravenous Q8H Patricia Dial PA-C      Followed by   • [START ON 5/29/2023] dexamethasone  2 mg Intravenous Q6H [address] Patricia Dial PA-C      Followed by   • [START ON 6/1/2023] dexamethasone  2 mg Intravenous Q8H Patricia Dial PA-C      Followed by   • [START ON 6/4/2023] dexamethasone  2 mg Intravenous Q12H [facility] Patricia Dial PA-C      Followed by   • [START ON 6/7/2023] dexamethasone  2 mg Intravenous Q24H Patricia Dial PA-C     • fentaNYL  50 mcg/hr Intravenous Continuous Maryan Filbert, DO 50 mcg/hr (05/24/23 2214)   • fentanyl citrate (PF)  50 mcg Intravenous Q1H PRN Patricia Dial PA-C     • folic acid  1 mg Oral Daily Patriciadanelle Dial PA-C     • gabapentin  100 mg Oral HS Patricia Dial PA-C     • heparin (porcine)  5,000 Units Subcutaneous [facility] Julio Corrales DO     • hydrALAZINE  10 mg Intravenous Once Patricia Dial PA-C     • hydrALAZINE  10 mg Intravenous Q4H PRN Patricia Dial PA-C     • HYDROmorphone  0 2 mg Intravenous Q2H PRN Patricia Dial PA-C     • ipratropium  0 5 mg Nebulization TID Pauline Archer MD     • levalbuterol  1 25 mg Nebulization TID Pauline Archer MD     • levETIRAcetam  500 mg Oral Q12H [facility] Julio Corrales DO     • midazolam  2 mg Intravenous Once Patricia Dial PA-C     • multivitamin-minerals  1 tablet Oral Daily Patricia Dial PA-C     • nicotine  7 mg Transdermal Daily Patricia Dial PA-C     • norepinephrine  1-30 mcg/min Intravenous Titrated Patricia Dial PA-C Stopped (05/24/23 1224)   • omeprazole (PRILOSEC) suspension 2 mg/mL  20 mg Oral Daily Tracie Spear MD     • ondansetron  4 mg Intravenous Q6H PRN Patricia Dial PA-C     • oxyCODONE  2 5 mg Oral Q4H PRN Patricia Dial PA-C     • oxyCODONE  5 mg Oral Q4H PRN Conway Taylor Dial PA-C     • PARoxetine  10 mg Oral Daily Julio Corrales DO     • potassium phosphate  21 mmol Intravenous Once Maryan Filbert, DO 21 mmol (05/25/23 1237)   • senna-docusate sodium  1 tablet Oral BID Julio Corrales DO     • thiamine  100 mg Oral Daily Patricia Dial PA-C       Continuous Infusions:  fentaNYL, 50 mcg/hr, Last Rate: 50 mcg/hr (05/24/23 0111)  norepinephrine, 1-30 mcg/min, Last Rate: Stopped (05/24/23 "1224)      PRN Meds:   acetaminophen, 325 mg, Q4H PRN  albuterol, 1 puff, Q4H PRN  albuterol, 2 5 mg, Q4H PRN  bisacodyl, 10 mg, Daily PRN  fentanyl citrate (PF), 50 mcg, Q1H PRN  hydrALAZINE, 10 mg, Q4H PRN  HYDROmorphone, 0 2 mg, Q2H PRN  ondansetron, 4 mg, Q6H PRN  oxyCODONE, 2 5 mg, Q4H PRN  oxyCODONE, 5 mg, Q4H PRN      Allergies   Allergies   Allergen Reactions   • No Active Allergies      Advance Directive and Living Will:      Power of :    POLST:      Counseling / Coordination of Care  Please see attendings attestation    Shannan Mendoza DO    Portions of the record may have been created with voice recognition software  Occasional wrong word or \"sound a like\" substitutions may have occurred due to the inherent limitations of voice recognition software    Read the chart carefully and recognize, using context, where substitutions have occurred  "
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
## 2023-05-26 NOTE — RESPIRATORY THERAPY NOTE
RT Ventilator Management Note  Danya Lugo 47 y o  female MRN: [id number]  Unit/Bed#: ICU 09 Encounter: [id number]      Daily Screen         5/25/2023 2032 5/26/2023  0735          Patient safety screen outcome[de-identified] Failed Failed (P)       Not Ready for Weaning due to[de-identified] Underline problem not resolved Underline problem not resolved (P)                 Physical Exam:   Assessment Type: Assess only  General Appearance: Sedated  Respiratory Pattern: Assisted  Chest Assessment: Chest expansion symmetrical  Bilateral Breath Sounds: Clear, Diminished  Cough: Productive  Suction: ET Tube  O2 Device: vent      Resp Comments: (P) pt remains on CMV settings  no sbt performed at this time will continue to monitor

## 2023-05-26 NOTE — PLAN OF CARE
Problem: SAFETY,RESTRAINT: NV/NON-SELF DESTRUCTIVE BEHAVIOR  Goal: Remains free of harm/injury (restraint for non violent/non self-detsructive behavior)  Description: INTERVENTIONS:  - Instruct patient/family regarding restraint use   - Assess and monitor physiologic and psychological status   - Provide interventions and comfort measures to meet assessed patient needs   - Identify and implement measures to help patient regain control  - Assess readiness for release of restraint   Outcome: Progressing  Goal: Returns to optimal restraint-free functioning  Description: INTERVENTIONS:  - Assess the patient's behavior and symptoms that indicate continued need for restraint  - Identify and implement measures to help patient regain control  - Assess readiness for release of restraint   Outcome: Progressing     Problem: PAIN - ADULT  Goal: Verbalizes/displays adequate comfort level or baseline comfort level  Description: Interventions:  - Encourage patient to monitor pain and request assistance  - Assess pain using appropriate pain scale  - Administer analgesics based on type and severity of pain and evaluate response  - Implement non-pharmacological measures as appropriate and evaluate response  - Consider cultural and social influences on pain and pain management  - Notify physician/advanced practitioner if interventions unsuccessful or patient reports new pain  Outcome: Progressing     Problem: INFECTION - ADULT  Goal: Absence or prevention of progression during hospitalization  Description: INTERVENTIONS:  - Assess and monitor for signs and symptoms of infection  - Monitor lab/diagnostic results  - Monitor all insertion sites, i e  indwelling lines, tubes, and drains  - Monitor endotracheal if appropriate and nasal secretions for changes in amount and color  - [place] appropriate cooling/warming therapies per order  - Administer medications as ordered  - Instruct and encourage patient and family to use good hand hygiene technique  - Identify and instruct in appropriate isolation precautions for identified infection/condition  Outcome: Progressing  Goal: Absence of fever/infection during neutropenic period  Description: INTERVENTIONS:  - Monitor WBC    Outcome: Progressing     Problem: SAFETY ADULT  Goal: Patient will remain free of falls  Description: INTERVENTIONS:  - Educate patient/family on patient safety including physical limitations  - Instruct patient to call for assistance with activity   - Consult OT/PT to assist with strengthening/mobility   - Keep Call bell within reach  - Keep bed low and locked with side rails adjusted as appropriate  - Keep care items and personal belongings within reach  - Initiate and maintain comfort rounds  - Make Fall Risk Sign visible to staff  - Offer Toileting every 2 Hours, in advance of need  - Initiate/Maintain bed alarm  - Obtain necessary fall risk management equipment: bed alarm  - Apply yellow socks and bracelet for high fall risk patients  - Consider moving patient to room near nurses station  Outcome: Progressing  Goal: Maintain or return to baseline ADL function  Description: INTERVENTIONS:  -  Assess patient's ability to carry out ADLs; assess patient's baseline for ADL function and identify physical deficits which impact ability to perform ADLs (bathing, care of mouth/teeth, toileting, grooming, dressing, etc )  - Assess/evaluate cause of self-care deficits   - Assess range of motion  - Assess patient's mobility; develop plan if impaired  - Assess patient's need for assistive devices and provide as appropriate  - Encourage maximum independence but intervene and supervise when necessary  - Involve family in performance of ADLs  - Assess for home care needs following discharge   - Consider OT consult to assist with ADL evaluation and planning for discharge  - Provide patient education as appropriate  Outcome: Progressing  Goal: Maintains/Returns to pre admission functional level  Description: INTERVENTIONS:  - Perform BMAT or MOVE assessment daily    - Set and communicate daily mobility goal to care team and patient/family/caregiver  - Collaborate with rehabilitation services on mobility goals if consulted  - Perform Range of Motion 3 times a day  - Reposition patient every 2 hours  - Dangle patient 2 times a day  - Stand patient 2 times a day  - Ambulate patient 2 times a day  - Out of bed to chair 2 times a day   - Out of bed for meals 3 times a day  - Out of bed for toileting  - Record patient progress and toleration of activity level   Outcome: Progressing     Problem: DISCHARGE PLANNING  Goal: Discharge to home or other facility with appropriate resources  Description: INTERVENTIONS:  - Identify barriers to discharge w/patient and caregiver  - Arrange for needed discharge resources and transportation as appropriate  - Identify discharge learning needs (meds, wound care, etc )  - Arrange for interpretive services to assist at discharge as needed  - Refer to Case Management Department for coordinating discharge planning if the patient needs post-hospital services based on physician/advanced practitioner order or complex needs related to functional status, cognitive ability, or social support system  Outcome: Progressing     Problem: Knowledge Deficit  Goal: Patient/family/caregiver demonstrates understanding of disease process, treatment plan, medications, and discharge instructions  Description: Complete learning assessment and assess knowledge base    Interventions:  - Provide teaching at level of understanding  - Provide teaching via preferred learning methods  Outcome: Progressing     Problem: MOBILITY - ADULT  Goal: Maintain or return to baseline ADL function  Description: INTERVENTIONS:  -  Assess patient's ability to carry out ADLs; assess patient's baseline for ADL function and identify physical deficits which impact ability to perform ADLs (bathing, care of mouth/teeth, toileting, grooming, dressing, etc )  - Assess/evaluate cause of self-care deficits   - Assess range of motion  - Assess patient's mobility; develop plan if impaired  - Assess patient's need for assistive devices and provide as appropriate  - Encourage maximum independence but intervene and supervise when necessary  - Involve family in performance of ADLs  - Assess for home care needs following discharge   - Consider OT consult to assist with ADL evaluation and planning for discharge  - Provide patient education as appropriate  Outcome: Progressing  Goal: Maintains/Returns to pre admission functional level  Description: INTERVENTIONS:  - Perform BMAT or MOVE assessment daily    - Set and communicate daily mobility goal to care team and patient/family/caregiver  - Collaborate with rehabilitation services on mobility goals if consulted  - Perform Range of Motion 3 times a day  - Reposition patient every 2 hours    - Dangle patient 2 times a day  - Stand patient 2 times a day  - Ambulate patient 2 times a day  - Out of bed to chair 2 times a day   - Out of bed for meals 3 times a day  - Out of bed for toileting  - Record patient progress and toleration of activity level   Outcome: Progressing     Problem: Prexisting or High Potential for Compromised Skin Integrity  Goal: Skin integrity is maintained or improved  Description: INTERVENTIONS:  - Identify patients at risk for skin breakdown  - Assess and monitor skin integrity  - Assess and monitor nutrition and hydration status  - Monitor labs   - Assess for incontinence   - Turn and reposition patient  - Assist with mobility/ambulation  - Relieve pressure over bony prominences  - Avoid friction and shearing  - Provide appropriate hygiene as needed including keeping skin clean and dry  - Evaluate need for skin moisturizer/barrier cream  - Collaborate with interdisciplinary team   - Patient/family teaching  - Consider wound care consult   Outcome: Progressing Problem: Nutrition/Hydration-ADULT  Goal: Nutrient/Hydration intake appropriate for improving, restoring or maintaining nutritional needs  Description: Monitor and assess patient's nutrition/hydration status for malnutrition  Collaborate with interdisciplinary team and initiate plan and interventions as ordered  Monitor patient's weight and dietary intake as ordered or per policy  Utilize nutrition screening tool and intervene as necessary  Determine patient's food preferences and provide high-protein, high-caloric foods as appropriate       INTERVENTIONS:  - Monitor oral intake, urinary output, labs, and treatment plans  - Assess nutrition and hydration status and recommend course of action  - Evaluate amount of meals eaten  - Assist patient with eating if necessary   - Allow adequate time for meals  - Recommend/ encourage appropriate diets, oral nutritional supplements, and vitamin/mineral supplements  - Order, calculate, and assess calorie counts as needed  - Recommend, monitor, and adjust tube feedings and TPN/PPN based on assessed needs  - Assess need for intravenous fluids  - Provide specific nutrition/hydration education as appropriate  - Include patient/family/caregiver in decisions related to nutrition  Outcome: Progressing

## 2023-05-26 NOTE — QUICK NOTE
I attempted to reach out to the patient's son, RAHEEM Gilbert, on May 26, 2023 at [phone]; unfortunately there was no answer  I subsequently called the patient's sister, Zehra Henry, on May 26 2023 at [phone]; the phone call was successfully accepted, and the sister was encouraged to reach out to the family decision maker, the patient's son, RAHEEM Gilbert, as soon as possible and to come to the hospital as the patient's clinical situation has changed  The patient's sister, Zehra Henry, verbalized understanding and stated that they will be coming to the hospital as soon as they are able  The patient's sister, Zehra Henry, also stated that the patient's son, RAHEEM Gilbert, provided them with verbal authorization in order to make medical decisions for the patient  Encouraged the patient sister, Zehra Henry, to contact the patient's son, RAHEEM Gilbert, and obtain written consent, or present to the hospital as soon as able  Patient's daughter, Zehra Henry, demonstrated understanding

## 2023-05-26 NOTE — ASSESSMENT & PLAN NOTE
POD 3 from L frontotemporal craniotomy for tumor resection Taj Bun 5/23/2023)   · Patient presented with altered sensorium and generalized weakness for 1 month    Imaging:   • CT head 5/26/2023: Formal read pending  Increased mass effect without visible supratentorial sulci, increased midline shift, increased brainstem compression with uncal herniation  Small areas of hemorrahge in the post operative surgical bed without acute large volume hemorrhage  Plan:   · Continue to monitor neurological exam    · STAT CT head ordered this morning for pupil asymmetry  · Keppra 500mg BID x 7 days unless concern for seizure   · JULIUS drain removed 5/24/23   · Hold all AC/AP medication at this time  · Ongoing medical management and pain control per primary team  · Palliative care following   · Suspect acute herniation overnight with pupillary change  · Plan for family meeting today once family arrive at the hospital  · DVT ppx: SCD, HSQ  · CM following for dispo planning  · No surgical intervention to be offered at this time  Patient had depressed functional status since prior surgery and would be unlikely to have meaningful recovery  · Will begin to follow from the periphery at this time  Contact with any needs

## 2023-05-26 NOTE — RESPIRATORY THERAPY NOTE
05/25/23 2032   Respiratory Assessment   Assessment Type Assess only   General Appearance Sedated   Respiratory Pattern Assisted   Chest Assessment Chest expansion symmetrical   Bilateral Breath Sounds Diminished;Clear   Cough Productive   Suction Trach   Resp Comments Rec'd pt  on SPONT  At [address]  returned to CMV dfue to apnea  Pt  is sedated, unrestrained  Alarms set and functioning  Will continue to monitor resp status  O2 Device vent   Vent Information   Vent ID [id number]   Vent type Vasquez G5   Vasquez C3/G5 Vent Mode (S)CMV   $ Pulse Oximetry Spot Check Charge Completed   SpO2 97 %   (S)CMV Settings   Resp Rate (BPM) 14 BPM   VT (mL) 350 mL   FIO2 (%) 40 %   PEEP (cmH2O) 6 cmH2O   Insp Time (%) 1 %   Flow Trigger (LPM) 5   Humidification Heater   Heater Temperature (Set) 95 °F (35 °C)   Pause Time (%) 0 %   (S)CMV Actuals   Resp Rate (BPM) 14 BPM   VT (mL) 348   MV 5   MAP (cmH2O) 6 1 cmH2O   Peak Pressure (cmH2O) 14 cmH2O   I:E Ratio (Obs) 367   Plateau Pressure (cm H2O) 12 9 cm H2O   (S)CMV Alarms   High Peak Pressure (cmH2O) 40   Low Pressure (cmH2O) 5 cm H2O   High Resp Rate (BPM) 40 BPM   Low Resp Rate (BPM) 8 BPM   High MV (L/min) 20 L/min   Low MV (L/min) 3 L/min   High VT (mL) 1000 mL   Low VT (mL) 250 mL   Apnea Time (s) 20 S   Maintenance   Alarm (pink) cable attached No   Resuscitation bag with peep valve at bedside Yes   Water bag changed No   Circuit changed No   Daily Screen   Patient safety screen outcome: Failed   Not Ready for Weaning due to:  Underline problem not resolved   IHI Ventilator Associated Pneumonia Bundle   Daily Assessment of Readiness to Extubate Yes   ETT  Cuffed   Placement Date/Time: 05/22/23 (c) 0751   Type: Cuffed  Location: Oral  Insertion attempts: 1  Placement Verification: End tidal CO2;Symmetrical chest wall movement  Secured at (cm): 22   Secured at (cm) 22   Measured from Lips   Secured Location Left   Repositioned Right to Left   Secured by Commercial tube [place]   Site Condition Dry   Cuff Pressure (cm H2O)   (green)   HI-LO Suction  Intermittent suction   HI-LO Secretions Scant   HI-LO Intervention Patent     RT Ventilator Management Note  Mariana Favors 47 y o  female MRN: [id number]  Unit/Bed#: ICU 09 Encounter: [id number]      Daily Screen         5/25/2023  1556 5/25/2023 2032          Patient safety screen outcome[de-identified] Failed Failed      Not Ready for Weaning due to[de-identified] Underline problem not resolved Underline problem not resolved      RSBI: 110 --                Physical Exam:   Assessment Type: Assess only  General Appearance: Sedated  Respiratory Pattern: Assisted  Chest Assessment: Chest expansion symmetrical  Bilateral Breath Sounds: Diminished, Clear  Cough: Productive  Suction: Trach  O2 Device: vent      Resp Comments: Rec'd pt  on SPONT  At [address]  returned to CMV dfue to apnea  Pt  is sedated, unrestrained  Alarms set and functioning  Will continue to monitor resp status

## 2023-05-26 NOTE — PROGRESS NOTES
Pastoral Care Progress Note    2023  Patient: Malia Monsalve : 1970  Admission Date & Time: 2023  MRN: [id number] CSN: [id number]        Was texted by ICU staff that family desired  to come pray for Pt  Went to room, clarified gently with the family leaders how I could best pray for Pt/situation, got their input  We gathered around the Pt/bed, many put their hands on the Pt, and I prayed a prayer of healing/release for Steffanie, and comfort for the family  Spiritual care remains available

## 2023-05-26 NOTE — RESPIRATORY THERAPY NOTE
05/26/23 0343   Respiratory Assessment   Assessment Type Assess only   General Appearance Sedated   Respiratory Pattern Assisted   Chest Assessment Chest expansion symmetrical   Bilateral Breath Sounds Clear;Diminished   Cough Productive   Suction ET Tube   Resp Comments Pt  remains on CMV, tolerating well  Will continue to monitor resp status     O2 Device vent   Vent Information   Vent ID [id number]   Vent type Vasquez G5   Vasquez C3/G5 Vent Mode (S)CMV   $ Pulse Oximetry Spot Check Charge Completed   SpO2 100 %   (S)CMV Settings   Resp Rate (BPM) 14 BPM   VT (mL) 350 mL   FIO2 (%) 40 %   PEEP (cmH2O) 6 cmH2O   Insp Time (%) 1 %   Flow Trigger (LPM) 5   Humidification Heater   Heater Temperature (Set) 95 °F (35 °C)   (S)CMV Actuals   Resp Rate (BPM) 16 BPM   VT (mL) 376   MV 5 8   MAP (cmH2O) 5 1 cmH2O   Peak Pressure (cmH2O) 10 cmH2O   I:E Ratio (Obs) 1:3 2   Heater Temperature (Obs) 95 °F (35 °C)   Plateau Pressure (cm H2O) 15 cm H2O   (S)CMV Alarms   High Peak Pressure (cmH2O) 40   Low Pressure (cmH2O) 5 cm H2O   High Resp Rate (BPM) 40 BPM   Low Resp Rate (BPM) 8 BPM   High MV (L/min) 20 L/min   Low MV (L/min) 3 L/min   High VT (mL) 1000 mL   Low VT (mL) 250 mL   Apnea Time (s) 20 S   Maintenance   Alarm (pink) cable attached No   Resuscitation bag with peep valve at bedside Yes   Water bag changed Yes   Circuit changed No   ETT  Cuffed   Placement Date/Time: 05/22/23 (c) 1322   Type: Cuffed  Location: Oral  Insertion attempts: 1  Placement Verification: End tidal CO2;Symmetrical chest wall movement  Secured at (cm): 22   Secured at (cm) 22   Measured from [employer] [address]   Repositioned Left to [address] [place] [address] by Commercial tube [place]a   Site Condition Dry   Cuff Pressure (cm H2O)   (green)   HI-LO Suction  Intermittent suction   HI-LO Secretions Scant   HI-LO Intervention Patent     RT Ventilator Management Note  Ester Bose 47 y o  female MRN: [id number]  Unit/Bed#: ICU 09 Encounter: [id number]      Daily Screen         5/25/2023  1556 5/25/2023 2032          Patient safety screen outcome[de-identified] Failed Failed      Not Ready for Weaning due to[de-identified] Underline problem not resolved Underline problem not resolved      RSBI: 110 --                Physical Exam:   Assessment Type: (P) Assess only  General Appearance: (P) Sedated  Respiratory Pattern: (P) Assisted  Chest Assessment: (P) Chest expansion symmetrical  Bilateral Breath Sounds: (P) Clear, Diminished  Cough: (P) Productive  Suction: (P) ET Tube  O2 Device: (P) vent      Resp Comments: (P) Pt  remains on CMV, tolerating well  Will continue to monitor resp status

## 2023-05-26 NOTE — PROGRESS NOTES
Called by nursing that family was ready to proceed with comfort directed care  I saw family in room including son, sisters and significant other    They are all in unison wanting to proceed with comfort directed care  I explained to them we will compassionately extubate her and ensure she remains comfortable  I answered all their questions and explained the next steps in detail

## 2023-05-26 NOTE — PROGRESS NOTES
[facility]  Progress Note  Name: Cristian Gomez  MRN: [id number]  Unit/Bed#: ICU 09 I Date of Admission: 5/21/2023   Date of Service: 5/26/2023 I Hospital Day: 5    Assessment/Plan   * Brain mass  Assessment & Plan  POD 3 from L frontotemporal craniotomy for tumor resection Eloy Vale 5/23/2023)   · Patient presented with altered sensorium and generalized weakness for 1 month    Imaging:   • CT head 5/26/2023: Formal read pending  Increased mass effect without visible supratentorial sulci, increased midline shift, increased brainstem compression with uncal herniation  Small areas of hemorrahge in the post operative surgical bed without acute large volume hemorrhage  Plan:   · Continue to monitor neurological exam    · STAT CT head ordered this morning for pupil asymmetry  · Keppra 500mg BID x 7 days unless concern for seizure   · JULIUS drain removed 5/24/23   · Hold all AC/AP medication at this time  · Ongoing medical management and pain control per primary team  · Palliative care following   · Suspect acute herniation overnight with pupillary change  · Plan for family meeting today once family arrive at the hospital  · DVT ppx: SCD, HSQ  · CM following for dispo planning  · No surgical intervention to be offered at this time  Patient had depressed functional status since prior surgery and would be unlikely to have meaningful recovery  · Will begin to follow from the periphery at this time  Contact with any needs  Subjective/Objective   Chief Complaint: None     Subjective: Patient remains intubated at this time  Overnight patient was noted to have change in pupil size  Nursing staff received info in report  Patient was sent for STAT CT head this morning       Objective: laying in bed     I/O       05/24 0701 05/25 0700 05/25 0701 05/26 0700 05/26 0701 05/27 0700    I V  (mL/kg) 150 5 (3 7) 106 6 (2 6)     NG/ 530     IV Piggyback 200 250     Feedings 640 960 "   Total Intake(mL/kg) 1870 5 (45 8) 1846 6 (45 3)     Urine (mL/kg/hr) 1220 (1 2) 2515 (2 6)     Emesis/NG output 0 0     Drains 40      Stool  0     Blood       Total Output 1260 2515     Net +610 5 -668 4            Unmeasured Stool Occurrence  1 x           Invasive Devices     Central Venous Catheter Line  Duration           CVC Central Lines 05/22/23 Triple 16cm 3 days          Drain  Duration           Urethral Catheter Temperature probe 16 Fr  4 days    NG/OG/Enteral Tube Orogastric 18 Fr Right mouth 3 days          Airway  Duration           ETT  Cuffed 3 days                Physical Exam:  Vitals: Blood pressure 111/65, pulse 82, temperature 99 7 °F (37 6 °C), temperature source Esophageal, resp  rate 13, height 4' 11\" (1 499 m), weight 40 8 kg (90 lb 0 1 oz), last menstrual period 06/02/2019, SpO2 100 %  ,Body mass index is 18 18 kg/m²  Hemodynamic Monitoring: MAP: Arterial Line MAP (mmHg): 108 mmHg     General appearance: ill appearing   Head: Normocephalic, without obvious abnormality  Eyes: OS 5mm slightly irregular and non reactive  OD 3mm and non reactive   Neck: supple, symmetrical, trachea midline  Back: no kyphosis present  Lungs: non labored breathing on vent   Heart: regular heart rate  Neurologic: cough intact  weak corneal reflex  Pupils irregular  Extensor posturing x4  No longer with withdraw in BLE       Lab Results:  Results from last 7 days   Lab Units 05/26/23  0542 05/25/23  0553 05/24/23  0558 05/23/23  1036 05/23/23  0614 05/21/23  1705   EOS PCT %  --  0  --   --  0 1   HEMATOCRIT % 30 6* 31 0* 33 4*  --  35 1 44 9   HEMATOCRIT, ISTAT   --   --   --    < >  --   --    HEMOGLOBIN g/dL 9 9* 9 9* 10 8*  --  12 4 15 4   I STAT HEMOGLOBIN   --   --   --    < >  --   --    MONOS PCT %  --  9  --   --   --  12   MONO PCT %  --   --   --   --  3*  --    NEUTROS PCT %  --  86*  --   --   --  78*   PLATELETS Thousands/uL 150 127* 187  --  247 232   WBC Thousand/uL 9 82 9 53 11 05*  --  19 38* " "8 28    < > = values in this interval not displayed  Results from last 7 days   Lab Units 05/26/23  0542 05/25/23  2358 05/25/23 2027 05/25/23  0011 05/24/23  2221 05/23/23  1337 05/23/23  1141 05/23/23  1036 05/22/23  1526 05/21/23  1705   ALK PHOS U/L 57  --   --   --  52  --   --   --   --  66   ALT U/L 12  --   --   --  8*  --   --   --   --  9   AST U/L 43  --   --   --  34  --   --   --   --  22   BUN mg/dL 16 15 13   < > 14   < >  --   --    < > 25   CALCIUM mg/dL 9 1 9 1 9 1   < > 7 8*   < >  --   --    < > 9 8   CHLORIDE mmol/L 121* 115* 114*   < > 126*   < >  --   --    < > 102   CO2 mmol/L 30 31 30   < > 26   < >  --   --    < > 21   CO2, I-STAT mmol/L  --   --   --   --   --   --  25 25   < >  --    CREATININE mg/dL 0 53* 0 58* 0 46*   < > 0 74   < >  --   --    < > 0 56*   GLUCOSE, ISTAT mg/dl  --   --   --   --   --   --  284* 238*  --   --    POTASSIUM mmol/L 4 4 4 0 3 6   < > 4 0   < >  --   --    < > 3 5    < > = values in this interval not displayed  Results from last 7 days   Lab Units 05/26/23  0542 05/25/23  0553 05/24/23  0558   MAGNESIUM mg/dL 1 9 1 9 1 9     Results from last 7 days   Lab Units 05/26/23  0542 05/25/23  0553 05/24/23  0558   PHOSPHORUS mg/dL 1 8* 2 0* 4 4     Results from last 7 days   Lab Units 05/25/23  0553 05/24/23  0558 05/21/23  1705   INR  1 34* 1 40* 1 16   PTT seconds  --  29 31     No results found for: \"TROPONINT\"  ABG:  Lab Results   Component Value Date    BEART -1 4 05/23/2023    MXQ4DEY 22 8 05/23/2023    KRF3TQE 36 6 05/23/2023    PHART 7 412 05/23/2023    PO2ART 145 1 (H) 05/23/2023    SOURCE Line, Arterial 05/23/2023       Imaging Studies: I have personally reviewed pertinent reports     and I have personally reviewed pertinent films in PACS  CT head wo contrast    Result Date: 5/23/2023  Impression: Status post left-sided craniotomy for the resection of 2 dominant metastatic lesions with expected postoperative hemorrhage and pneumocephalus in the " operative bed  Small foci of Duret type hemorrhage within the left brainstem likely sequela of previously seen uncal herniation, which has improved  Findings appear new since prior CT and grossly stable since recent MRI  Note is also made of a new left superior cerebellar lacunar infarct, new since prior exams  Grossly stable rightward midline shift with partial entrapment of the right lateral ventricle and transependymal flow of CSF  The study was marked in [facility] for immediate notification  Workstation performed: [id number]     XR chest portable    Result Date: 5/23/2023  Impression: Background emphysema  Known left lower lobe mass overlies the left hilum  No pneumothorax after right IJ central venous catheter placement  Workstation performed: [id number]     MRI brain w wo contrast    Result Date: 5/23/2023  Impression: Multiple enhancing lesions identified within the brain parenchyma consistent with metastatic disease  The largest lesions are seen within the left hemisphere involving the insular region and anterior left frontal lobe with irregular peripheral enhancement and extensive surrounding vasogenic edema  Multiple additional smaller lesions are scattered within the brain parenchyma  The edema and mass effect results in approximately 8 mm of left to right shift with partial effacement of the suprasellar cistern   Workstation performed: [id number]     CT head wo contrast    Result Date: 5/22/2023  Impression: Metastatic disease with multiple intracranial lesions with surrounding vasogenic edema with subfalcine herniation, uncal herniation and left-to-right shift of about 1 5 cm with stable midline shift and mass effect High attenuation seen within the left insular cistern, unchanged due to perilesional hemorrhage or leptomeningeal disease or sulcal/extra-axial hemorrhage, stable I personally discussed this study with LYRIC AMARO on 5/22/2023 5:04 PM  Workstation performed: [id number]     CT chest abdomen pelvis w contrast    Result Date: 5/22/2023  Impression: 3 x 2 8 x 2 6 cm mass superior segment of the left lower lobe highly suspicious for primary lung cancer  Additional scattered bilateral subcentimeter nodules are indeterminate  Upper lobe predominant emphysema  No metastatic disease in the abdomen or pelvis  The study was marked in [facility] for immediate notification  Workstation performed: [id number]     XR hip/pelv 2-3 vws right    Result Date: 5/22/2023  Impression: No acute osseous abnormality  Workstation performed: [id number]     XR chest 1 view portable    Result Date: 5/22/2023  Impression: Right middle lobe opacity may represent pneumonia or atelectasis  Left hilar mass  The study was marked in [facility] for immediate notification    Workstation performed: [id number]     CT head without contrast    Result Date: 5/21/2023  Impression: Multiple (at least 4) intracranial mass lesions as above including dominant left frontal/basal ganglia lesions with significant associated vasogenic edema in the left greater than right hemispheres highly concerning for metastases  Significant associated  mass effect including 1 cm rightward subfalcine herniation  Nonspecific marginal hyperattenuation suspicious for small amount of perilesional hemorrhage  Associated ventricular entrapment/obstructive hydrocephalus  Urgent neurosurgery consult and follow-up gadolinium-enhanced MRI of the brain advised  Above findings discussed with Dr Porsha Lambert at 6:30 p m  on 5/21/2023  Workstation performed: [id number]       EKG, Pathology, and Other Studies: I have personally reviewed pertinent reports        VTE Pharmacologic Prophylaxis: Heparin    VTE Mechanical Prophylaxis: sequential compression device

## 2023-05-26 NOTE — PROGRESS NOTES
23 1800   Clinical Encounter Type   Visited With Family   Routine Visit Follow-up   Crisis Visit Patient actively dying      Pastoral Care Progress Note    2023  Patient: Dalton Gu : 1970  Admission Date & Time: 2023  MRN: [id number] CSN: [id number]          [address] on-call continues to monitor for any family needs  They shared stories and memories with me  They have a lot of internal support but if anything arises please reach out to spiritual care

## 2023-05-26 NOTE — PROGRESS NOTES
05/26/23 1000   Clinical Encounter Type   Visited With Patient;Patient not available;Health care provider   Routine Visit Follow-up   Continue Visiting Yes      visited w pt and nurse, pt's sisters were in the cafeteria   offered pt silent prayer and supportive presence  Spiritual Care remains available

## 2023-05-26 NOTE — PROGRESS NOTES
[address] [facility]  Progress Note: Critical Care  Name: Asad Pavon 47 y o  female I MRN: [id number]  Unit/Bed#: ICU 09 I Date of Admission: 5/21/2023   Date of Service: 5/26/2023 I Hospital Day: 5    Assessment/Plan   Neuro:   · Diagnosis: Brain metastases  · Patient presented with a 1 month history of waxing and waning weakness and 1 week of worsening confusion that led to admission  · May 21: CT head demonstrated multiple (at least 4) intracranial mass lesions including dominant left frontal/basal ganglia lesions with significant associated vasogenic edema in the left greater than the right hemispheres highly concerning for metastasis  Significant associated mass effect including 1 cm rightward subfalcine herniation  Nonspecific marginal-Per attenuation suspicious for small amount of perilesional hemorrhage  Associated ventricular entrapment/obstructive hydrocephalus  · May 21: CT chest abdomen pelvis demonstrated a mass superior segment of the left lobe of the lung highly suspicious for primary lung cancer  Scattered bilateral subcentimeter nodules are indeterminate  Upper lobe predominant emphysema  No metastatic disease in the abdomen and pelvis  · Patient subsequently  dexamethasone 4 mg every 6 hours and started on levetiracetam 500 mg twice daily  · May 22: Mental status and neuro examination worsened, there was suspicion for worsening herniation/increased intracranial pressure, patient required CBC and intubation, received 75 g mannitol, and hypertonic saline  Point-of-care ultrasound of the optic nerve sheath demonstrated bilateral more than 5 mm concerning for increased ICP  · May 23: MRI brain with and without contrast demonstrated multiple enhancing lesions within the brain parenchyma consistent with metastatic disease    The largest lesions are seen within the left hemisphere involving the insular region and anterior left frontal lobe with irregular peripheral enhancement and extensive surrounding vasogenic edema  Multiple additional small lesions are scattered within the brain parenchyma  The edema and mass effect resulted in approximately 8 mm of left-to-right shift with partial effacement of the suprasellar cistern  · Osmolar gap noted to be 15, underwent left frontal craniotomy for tumor resection, 2 large metastatic lesions resected, first and sections consistent with metastatic carcinoma  · May 23: Postop MRI demonstrated postsurgical changes with no residual tumor  Focal infarct along the left frontal resection cavity and small infarct in bilateral occipital lobes and left cerebellar hemisphere  Stable left to right midline shift of 8 mm  Decreased vasogenic edema  Remainder of lesions and edema stable  ? May 24: Serum sodium noted to be 160, serum osmolarity noted to be 335  ?  May 26 (POD #3): Na 149 from 146 s/p 1x bolus HTS, osm 327, L pupil 2 mm> R  · Plan:   · Take off vEEG  · Continue HTS vs  Craniectomy for herniation (anisocoria)   · Day 6/7 for Keppra 500 mg BID PO  · q12 BMP/osm  · Continue decadron taper as ordered  · Continue mild sedation, attempt to wean as tolerated  · Maintain normothermia  · Head of bed elevation more than 30 degrees  · SBP <160, MAP>65 (PRN IV hydralazine)  · F/u with case management about who is active health care proxy    • Diagnosis: Sedation  · Plan:  - Fentanyl back up to 50 mcg/h on 5/25  - Wean as tolerated  - As needed oxycodone and hydromorphone  • Diagnosis: Alcohol abuse  · Plan:  - CIWA protocol discontinued  - Currently intubated mechanically ventilated  - Continue thiamine and folate  • Diagnosis: Depression  · Plan:  - Paxil 10 mg daily at home; restarted 05/25  • Diagnosis: Tobacco use  · Plan:  - Continue NicoDerm patch  CV:   • Diagnosis: No active issues  • May 23 echocardiogram: Left ventricular ejection fraction 70%, increased wall thickness, cannot exclude regional wall motion abnormality due to difficult study  Pulm:  • Diagnosis: Mechanical ventilation  · Plan:  - Switched to PSV (8/6, 40%) on 5/25-> Became tachycardic--> Back on ACVC  - VC-AC 14/350/6/40  - Plan  - Consult with family if trach is an option  - Consider extubation if encephalopathy improves  • Diagnosis: COPD  · Home meds:  - Trelegy Ellipta 100, 1 inhalation daily  - Albuterol rescue inhaler 2 inhalations 4 times a day as needed  - Fluticasone nasal spray 2 sprays in each nostril daily  - Astelin nasal spray 2 sprays in each nostril twice daily  · Plan:  - Continue ipratropium 0 5 mg neb 3 times daily  - Levalbuterol 1 25 mg neb 3 times daily  GI:   · Diagnosis: GERD  · On ompeprazole 20 mg QD at home  · Plan:   · Continue omeprazole 20 mg QD  · Diagnosis: Opiate induced constipation  · Had 1x bowel movement 5/25  · Plan:   · Senokot S PO BID  · Dulcolax rectal suppository 10 mg QD PRN    :   · Diagnosis: No active issues  · Plan:   · Continue to monitor renal function  · 5/26: BUN 16, Cr 0 53      F/E/N:   · Fluids: None  · Electrolytes: Continue to monitor and replete  · K+ at 4 4 s/p 80 mEq on 5/25  · Phos downtrending 2 0-->1 8 despite 21 mmol infusion, replete for goal >3  · Magnesium mildly low at 1 9-> Replete for goal of >2  ·   Heme/Onc:   · Diagnosis: Leukocytosis  · Plan: Resolved, WBC stable 9 53-9 82  · Diagnosis: Macrocytic anemia  · Hemoglobin stable at 9 9,  on 5/26  · Continue to monitor CBC w/diff daily, transfuse if sxs or Hgb <7 0    Endo:   · Diagnosis: No active issues  · Restarted on SSI algorithm 3 (from 1) on 5/25 for BG peak of 220  · Plan:   · Discontinue insulin to avoid hypoglycemia  · Maintain glucose 140-180      ID:   • Diagnosis: Abnormal urinalysis  · Microscopic urinalysis demonstrated positive nitrite, negative leukocytes  Patient met SIRS criteria on 5-22 with temperature less than 36 Celsius, respiratory rate more than 20  Leukocytosis of 19 38 on 5/23, bandemia of 13%    Started IV ceftriaxone 1 g on 5/23, did not receive first dose  Blood cultures negative x2  MRSA culture discontinued due to improper collection  · Tmax 100 4 morning of 5/26, down to 99 7  · Plan:  - MRSA culture pending  - Continue IV ceftriaxone day 3/3  - Trend CBC and differential  - Trend fever curve    MSK/Skin:   • Diagnosis: Right hip pain  · Patient's family, patient has been having right hip pain for the past year  Patient seen by medicine, patient declined steroid injections because afraid of needles  5/21 x-ray of hip and pelvis, right, demonstrated no lytic blastic osseous lesions, no acute osseous abnormality  · Plan:  - Continue to monitor  • Diagnosis: LUE pitting edema  • 2+ pitting edema present on patient's LUE, mostly at forearm/hand   • Left antecubital fossa erythema  • Most likely dependent edema  - Plan:  - Monitor for worsening edema  - Consider venous doppler if worsens  PPX:  · Currently on SC heparin, can switch to SC lovenox per NSGY and renal function OK  · Last dose at 0524 on 5/26  · Continue omeprazole 20 mg QD for stress ulcer ppx  Disposition: Critical care    LDA: RIJ CVC, sanchez, ETT  ICU Core Measures     Vented Patient  VAP Bundle  VAP bundle ordered     A: Assess, Prevent, and Manage Pain · Has pain been assessed? NA  · Need for changes to pain regimen? No   B: Both Spontaneous Awakening Trials (SATs) and Spontaneous Breathing Trials (SBTs) · Plan to perform spontaneous awakening trial today? No secondary to elevated ICP/Craniectomy without helmet   · Plan to perform spontaneous breathing trial today? Modified and patient remained on sedation other than precedex  · Obvious barriers to extubation? Yes   C: Choice of Sedation · RASS Goal: -5 Unarousable  · Need for changes to sedation or analgesia regimen? Yes   D: Delirium · CAM-ICU: Unable to perform secondary to Acute cognitive dysfunction   E: Early Mobility  · Plan for early mobility?  No   F: Family Engagement · Plan for family engagement today? Yes       Antibiotic Review: Antibiotics to be discontinued today    Review of Invasive Devices: Fatima Plan: Continue for accurate I/O monitoring for 48 hours  Central access plan: Medications requiring central line      Prophylaxis:  VTE VTE covered by:  heparin (porcine), Subcutaneous, 5,000 Units at 05/26/23 0524       Stress Ulcer  covered byomeprazole (PRILOSEC) suspension 2 mg/mL [[id number]]          Subjective   HPI/24hr events: Patient had tachycardia to the 170's, so her fentanyl was increased from 25 to 50 mcg/hr, and she received 2x PRN IV hydralazine 10 mg for hypertension  She also received 4x IV fentanyl 50 mcg for tachycardia/agitation  She was placed back on ACVC from spontaneous ventilation due to tachycardia  She was placed on vEEG due to suspected seizure-like activity (rhythmic tongue movements, shaking thighs), but no epileptiform discharges were seen  This morning, per nursing, patient required 1x bolus of 250 ml 3% NS because her L pupil was 2 mm bigger than the right  Her potassium was replenished with total of 80 mEq over the past 24 hours  She had 1 medium, loose brown bowel movement yesterday  Review of Systems   Unable to perform ROS: Intubated            Objective                            Vitals I/O      Most Recent Min/Max in 24hrs   Temp 99 7 °F (37 6 °C) Temp  Min: 98 2 °F (36 8 °C)  Max: 100 4 °F (38 °C)   Pulse 82 Pulse  Min: 56  Max: 134   Resp 13 Resp  Min: 12  Max: 26   /65 BP  Min: 81/58  Max: 201/83   O2 Sat 100 % SpO2  Min: 96 %  Max: 100 %      Intake/Output Summary (Last 24 hours) at 5/26/2023 1181  Last data filed at 5/26/2023 0600  Gross per 24 hour   Intake 1846 59 ml   Output 2515 ml   Net -668 41 ml         Diet Enteral/Parenteral; Tube Feeding No Oral Diet; Jevity 1 2 Yevgeniy; Continuous; 40     Invasive Monitoring Physical exam   None Physical Exam  Constitutional:       Appearance: She is ill-appearing        Comments: Intubated, mechanically ventilated, malnourished   HENT:      Head: Normocephalic and atraumatic  Mouth/Throat:      Mouth: Mucous membranes are moist    Eyes:      General: No scleral icterus  Conjunctiva/sclera: Conjunctivae normal       Comments: L pupil 5 mm, R pupil 3 mm non-reactive to light   Cardiovascular:      Rate and Rhythm: Normal rate and regular rhythm  Pulses: Normal pulses  Heart sounds: Normal heart sounds  No murmur heard  Pulmonary:      Breath sounds: Normal breath sounds  No wheezing or rales  Comments: Upper airway secretions  Abdominal:      General: Abdomen is flat  Bowel sounds are normal  There is no distension  Palpations: Abdomen is soft  Musculoskeletal:         General: Swelling (LUE, +2 edema, no incraesed warmth but erythema at L AC fossa) present  Skin:     General: Skin is warm and dry  Capillary Refill: Capillary refill takes less than 2 seconds  Coloration: Skin is not jaundiced  Neurological:      Cranial Nerves: Cranial nerve deficit (L pupil >2 mm than R) present  Comments: Patient intubated, mechanically ventilated  + cough, + corneal reflexes bilaterally  Patient withdraws to pain with noxious stimulus to RUE>LUE  Triple flexion elicited in bilat LEs w/noxious stimulus  Bilateral upgoing babinski, no ankle clonus  Decerebrate posturing  Diagnostic Studies      EK/21- Sinus rhythm with short PRN, otherwise normal  Imagin/23 - Post-op MRI brain w/wo - Post-surgical changes, no residual tumor  Focal infarct along left frontal resection cavity and small infarcts in bilateral occipital lobes and left cerebellar hemisphere  Stable left-to-right midline shift of 8 mm  Decreased vasogenic edema  Remainder of lesions and edema stable  I have personally reviewed pertinent reports         Medications:  Scheduled PRN   cefTRIAXone, 1,000 mg, Q24H  chlorhexidine, 15 mL, Q12H NATIVIDAD  dexamethasone, 4 mg, Q8H   Followed by  [place] Levels ON 5/29/2023] dexamethasone, 2 mg, Q6H [facility]   Followed by  Rita Hayes ON 6/1/2023] dexamethasone, 2 mg, Q8H   Followed by  [START ON 6/4/2023] dexamethasone, 2 mg, Q12H [facility]   Followed by  Rita Hayes ON 6/7/2023] dexamethasone, 2 mg, S79R  folic acid, 1 mg, Daily  gabapentin, 100 mg, HS  heparin (porcine), 5,000 Units, Q8H [facility]  insulin lispro, 1-6 Units, Q6H NATIVIDAD  ipratropium, 0 5 mg, TID  levalbuterol, 1 25 mg, TID  levETIRAcetam, 500 mg, Q12H [facility]  multivitamin-minerals, 1 tablet, Daily  nicotine, 7 mg, Daily  omeprazole (PRILOSEC) suspension 2 mg/mL, 20 mg, Daily  PARoxetine, 10 mg, Daily  senna-docusate sodium, 1 tablet, BID  thiamine, 100 mg, Daily      acetaminophen, 650 mg, Q6H PRN  albuterol, 1 puff, Q4H PRN  albuterol, 2 5 mg, Q4H PRN  bisacodyl, 10 mg, Daily PRN  fentanyl citrate (PF), 50 mcg, Q1H PRN  hydrALAZINE, 10 mg, Q4H PRN  HYDROmorphone, 0 2 mg, Q2H PRN  ondansetron, 4 mg, Q6H PRN  oxyCODONE, 2 5 mg, Q4H PRN  oxyCODONE, 5 mg, Q4H PRN       Continuous    fentaNYL, 50 mcg/hr, Last Rate: 50 mcg/hr (05/26/23 0010)  norepinephrine, 1-30 mcg/min, Last Rate: Stopped (05/24/23 1224)         Labs:    CBC    Recent Labs     05/25/23  0553 05/26/23  0542   HCT 31 0* 30 6*   HGB 9 9* 9 9*   * 150   WBC 9 53 9 82     BMP    Recent Labs     05/25/23 2027 05/25/23  2358   AGAP 1* 0*   BUN 13 15   CALCIUM 9 1 9 1   * 115*   CO2 30 31   CREATININE 0 46* 0 58*   K 3 6 4 0   SODIUM 145 146       Coags    Recent Labs     05/25/23  0553   INR 1 34*        Additional Electrolytes  Recent Labs     05/25/23  0553   MG 1 9   PHOS 2 0*          Blood Gas    No recent results  No recent results LFTs  Recent Labs     05/24/23  2221   ALB 2 7*   ALKPHOS 52   ALT 8*   AST 34   TBILI 0 16*       Infectious  No recent results  Glucose  Recent Labs     05/25/23  0553 05/25/23  1251 05/25/23  2027 05/25/23  2358   GLUC 176* 135 177* 194*               Critical Care Time Delivered: Upon my evaluation, this patient had a high probability of imminent or life-threatening deterioration due to s/p left frontal craniotomy for brain mets resection, requires mechanical ventilation, which required my direct attention, intervention, and personal management  I have personally provided 30 minutes of critical care time, exclusive of procedures, teaching, family meetings, and any prior time recorded by providers other than myself       Jenna Fragoso

## 2023-05-26 NOTE — PLAN OF CARE
Meeting needs via TF  Problem: Nutrition/Hydration-ADULT  Goal: Nutrient/Hydration intake appropriate for improving, restoring or maintaining nutritional needs  Description: Monitor and assess patient's nutrition/hydration status for malnutrition  Collaborate with interdisciplinary team and initiate plan and interventions as ordered  Monitor patient's weight and dietary intake as ordered or per policy  Utilize nutrition screening tool and intervene as necessary  Determine patient's food preferences and provide high-protein, high-caloric foods as appropriate       INTERVENTIONS:  - Monitor oral intake, urinary output, labs, and treatment plans  - Assess nutrition and hydration status and recommend course of action  - Evaluate amount of meals eaten  - Assist patient with eating if necessary   - Allow adequate time for meals  - Recommend/ encourage appropriate diets, oral nutritional supplements, and vitamin/mineral supplements  - Order, calculate, and assess calorie counts as needed  - Recommend, monitor, and adjust tube feedings and TPN/PPN based on assessed needs  - Assess need for intravenous fluids  - Provide specific nutrition/hydration education as appropriate  - Include patient/family/caregiver in decisions related to nutrition  Outcome: Progressing

## 2023-05-26 NOTE — PROGRESS NOTES
Pastoral Care Progress Note    2023  Patient: Dawn Valderrama : 1970  Admission Date & Time: 2023  MRN: [id number] [facility]: [id number]        Alex Kee is off today, and Kirk Palacios stood in and attended family meeting with medical team/Palliative  Introduced self to family and assured them that spiritual care remains available as they desire/need it  Also talked to staff to ask them to Natalie Text me whenever needed today   remains available, and will round in ICU throughout the day just to check in

## 2023-05-26 NOTE — PROGRESS NOTES
Progress note - Palliative and Supportive Care   Steffanie Mendez 47 y o  female [id number]    Patient Active Problem List   Diagnosis   • Asthma   • Gastroesophageal reflux disease with hiatal hernia   • Uncontrolled moderate persistent asthma   • Simple chronic bronchitis (HCC)   • Hepatitis C antibody test positive   • Other dysphagia   • Depression   • Brain mass   • COPD (chronic obstructive pulmonary disease) (HCC)   • Encephalopathy   • Alcohol abuse, continuous     Active issues specifically addressed today include:   • Palliative care encounter  • Goals of care discussion  • Brain masses with associated brain compression and herniation  • Respiratory failure, ventilator dependent  • Suspected lung cancer with mets to brain     Plan:  1  Symptom management -   • Per primary team, including CIWA protocol  • Reference comfort care order set or notify palliative medicine if transition to comfort care is made      2  Goals -   • Family meeting took place this morning at approximately 10:40, see note below for details  In short Steffanie's family is aware in change in CT scan and exam consistent with herniation  Discussed high risk for impending brain death  Family asked appropriate questions regarding compassionate extubation vs extubation after transition to brain death  However, no formal decisions at this time                  Code Status: full - Level 1              Decisional apparatus:  Patient is not competent on my exam today  If competence is lost, patient's substitute decision maker would default to adult son Lisa Morin) by [place] 169  - Sisters previously expressed concern regarding strained relationship between Arlin and Steffanie   - Significant other, Aline Syed, identifies himself as  but they are not legally   - Ultimately family appears to be communicating and making decisions as a unit  Advance Directive / Living Will / POLST:  None on file     3   Social support  • Time spent providing "supportive listening  Appreciate pastoral care presence  • Significant other, Lea Chaudhari  • Ministerio Pete (16) and Paddy Deann (32)  • Sisters ([address] and [place])  • Extended family     4  Follow-up  • Palliative care will continue to follow, page with questions or concerns    Interval history:       Overnight patient had a change in exam, unequal pupils prompting CT scan of the head which demonstrated brainstem herniation, extension posturing  Family meeting took place as below  Record of Family Meeting - Palliative and Supportive Care   Steffanie Mendez 47 y o  female [id number]    Recommendations and Plan:  · Ongoing medical management and neuro assessments at this time  · Family to maximize visitation time with knowledge of her imminent condition  · Should patient progress to brain death, family is prepared for extubation  Otherwise, transition to comfort care should family decide  Patient is not stable for transition to hospice IPU nor home  A family meeting was held to provide a medical update  This meeting was necessary for determine the appropriate course of treatment  Time of Meeting: 10:40  Meeting Location: ICU conference room  Participants:   Dr Rafy Willams, Dr Masha Camejo, and Massiel Hawkins (medical student) critical care  Kaneohe Shark, pastoral care  Sisters, David and [address] along with  Carey's son and   Significant other, Jazmin Hubbard, Phil Nguyen PA-C     Patient Participation: unable     Patient Support System: family as above  Additional extended family     Advanced Directive of POLST available: no    Topics of Discussion:  · Encouraged family to visit \"sooner than later\" due to high risk for further deterioration or progression to brain death  · Discussed no chance of meaningful recovery  · Family asked appropriate questions regarding compassionate extubation vs removal of ventilator if/when she does progress to brain death  · Family inquired whether discharge home is feasible   Explained " that patient is far too unstable for this to be a safe or comfortable option for her  Other Content of Meeting:  Time spent providing supportive listening    Time Involved in Meetin minutes , beginning at approximately  10:40 and ending at approximately 11:05  MEDICATIONS / ALLERGIES:     all current active meds have been reviewed    Allergies   Allergen Reactions   • No Active Allergies        OBJECTIVE:    Physical Exam  Physical Exam  Constitutional:       Appearance: She is ill-appearing  HENT:      Head:      Comments: Craniotomy incision CDI  Cardiovascular:      Comments: hypertensive  Pulmonary:      Comments: Ventilated via ETT  Abdominal:      Tenderness: There is no guarding  Genitourinary:     Comments: Urinary catheter in place  Skin:     General: Skin is warm and dry  Findings: Bruising (LUE) present  Neurological:      Comments: GCS4T extension posturing   Psychiatric:      Comments: No agitation         Lab Results:   Results from last 7 days   Lab Units 23  0542 23  0553 23  0558 23  1036 23  0614 23  1705   EOS PCT %  --  0  --   --  0 1   HEMATOCRIT % 30 6* 31 0* 33 4*  --  35 1 44 9   HEMATOCRIT, ISTAT   --   --   --    < >  --   --    HEMOGLOBIN g/dL 9 9* 9 9* 10 8*  --  12 4 15 4   I STAT HEMOGLOBIN   --   --   --    < >  --   --    MONOS PCT %  --  9  --   --   --  12   MONO PCT %  --   --   --   --  3*  --    NEUTROS PCT %  --  86*  --   --   --  78*   PLATELETS Thousands/uL 150 127* 187  --  247 232   WBC Thousand/uL 9 82 9 53 11 05*  --  19 38* 8 28    < > = values in this interval not displayed       Results from last 7 days   Lab Units 23  0542 23  2358 23  2027 23  0011 23  2221 23  1337 23  1141 23  1036 23  1526 23  1705   ALK PHOS U/L 57  --   --   --  52  --   --   --   --  66   ALT U/L 12  --   --   --  8*  --   --   --   --  9   AST U/L 43  --   --   --  34  -- --   --   --  22   BUN mg/dL 16 15 13   < > 14   < >  --   --    < > 25   CALCIUM mg/dL 9 1 9 1 9 1   < > 7 8*   < >  --   --    < > 9 8   CHLORIDE mmol/L 121* 115* 114*   < > 126*   < >  --   --    < > 102   CO2 mmol/L 30 31 30   < > 26   < >  --   --    < > 21   CO2, I-STAT mmol/L  --   --   --   --   --   --  25 25   < >  --    CREATININE mg/dL 0 53* 0 58* 0 46*   < > 0 74   < >  --   --    < > 0 56*   GLUCOSE, ISTAT mg/dl  --   --   --   --   --   --  284* 238*  --   --    POTASSIUM mmol/L 4 4 4 0 3 6   < > 4 0   < >  --   --    < > 3 5    < > = values in this interval not displayed  Imaging Studies: reviewed pertinent studies   EKG, Pathology, and Other Studies: reviewed pertinent studies    Counseling / Coordination of Care    Total floor / unit time spent today 50 minutes  Greater than 50% of total time was spent with the patient and / or family counseling and / or coordination of care  A description of the counseling / coordination of care: Spent assessing patient, communicating with RN, primary team, family meeting as documented above, coordination of care, providing supportive listening      This note was not shared with the patient due to privacy exception: note includes other individuals

## 2023-05-27 LAB
BACTERIA BLD CULT: NORMAL
BACTERIA BLD CULT: NORMAL

## 2023-05-27 NOTE — PROGRESS NOTES
Daily Progress Note - Critical Care   Fide Taylor 47 y o  female MRN: [id number]  Unit/Bed#: ICU 09 Encounter: [id number]        ----------------------------------------------------------------------------------------  HPI/24hr events:   Patient made comfort care yesterday afternoon after discussion with family    ---------------------------------------------------------------------------------------  SUBJECTIVE    Review of Systems   Unable to perform ROS: Other     Review of systems was unable to be performed secondary to comfort care  ---------------------------------------------------------------------------------------  Assessment and Plan:    1  Brain metastasis status post left frontal crani   2  Cerebral edema/brain compression  3  Herniation Syndrome    Comfort care measures  Analgesia - Fentanyl, tylenol, morphine  Anxiolytics - Haldol, ativan,     Disposition: Comfort Care  Code Status: Level 4 - Comfort Care  ---------------------------------------------------------------------------------------  ICU CORE MEASURES    Prophylaxis   Comfort care    ABCDE Protocol (if indicated)  Plan to perform spontaneous awakening trial today? Not applicable  Plan to perform spontaneous breathing trial today? Not applicable  Obvious barriers to extubation? Not applicable  CAM-ICU: Negative    Invasive Devices Review  Invasive Devices     Central Venous Catheter Line  Duration           CVC Central Lines 05/22/23 Triple 16cm 4 days          Drain  Duration           Urethral Catheter Temperature probe 16 Fr  5 days    NG/OG/Enteral Tube Orogastric 18 Fr Right mouth 4 days              Can any invasive devices be discontinued today?  Not applicable  ---------------------------------------------------------------------------------------  OBJECTIVE    Vitals   Vitals:    05/26/23 1240 05/26/23 1300 05/26/23 1400 05/26/23 1500   BP:  106/64 134/70 149/76   BP Location:       Pulse:  80 82 82   Resp:  (!) 26 20 (!) 33   Temp: 99 3 °F (37 4 °C) 99 3 °F (37 4 °C) (!) 100 8 °F (38 2 °C)   TempSrc:       SpO2: 98% 98% 99% 99%   Weight:       Height:         Temp (24hrs), Av 5 °F (37 5 °C), Min:98 6 °F (37 °C), Max:100 8 °F (38 2 °C)  Current: Temperature: (!) 100 8 °F (38 2 °C)    Respiratory:  SpO2: SpO2: 99 %       Invasive/non-invasive ventilation settings   Respiratory    Lab Data (Last 4 hours)    None         O2/Vent Data (Last 4 hours)    None                Physical Exam  Vitals and nursing note reviewed         Comfort care        Laboratory and Diagnostics:  Results from last 7 days   Lab Units 23  0542 23  0553 23  0558 23  1141 23  1036 23  0614 23  1705   BANDS PCT %  --   --   --   --   --  13*  --    EOS PCT %  --  0  --   --   --  0 1   HEMATOCRIT % 30 6* 31 0* 33 4*  --   --  35 1 44 9   HEMATOCRIT, ISTAT %  --   --   --  35 32*  --   --    HEMOGLOBIN g/dL 9 9* 9 9* 10 8*  --   --  12 4 15 4   I STAT HEMOGLOBIN g/dl  --   --   --  11 9 10 9*  --   --    MONOS PCT %  --  9  --   --   --   --  12   MONO PCT %  --   --   --   --   --  3*  --    NEUTROS PCT %  --  86*  --   --   --   --  78*   PLATELETS Thousands/uL 150 127* 187  --   --  247 232   WBC Thousand/uL 9 82 9 53 11 05*  --   --  19 38* 8 28     Results from last 7 days   Lab Units 23  0542 23  2358 23  2027 23  1251 23  0553 23  0011 23  2221 23  1446 23  0558 23  1526 23  1705   ANION GAP mmol/L -2* 0* 1* 0* -2* 0* 1*   < > 0*   < > 16*   ALBUMIN g/dL 2 7*  --   --   --   --   --  2 7*  --  2 7*  --  4 3   ALK PHOS U/L 57  --   --   --   --   --  52  --   --   --  66   ALT U/L 12  --   --   --   --   --  8*  --   --   --  9   AST U/L 43  --   --   --   --   --  34  --   --   --  22   BUN mg/dL 16 15 13 15 15 15 14   < > 9   < > 25   CALCIUM mg/dL 9 1 9 1 9 1 8 1* 8 5 8 0* 7 8*   < > 7 9*   < > 9 8   CHLORIDE mmol/L 121* 115* 114* 116* 123* 126* 126*   < > 134*   < > 102   CO2 mmol/L 30 31 30 28 28 26 26   < > 26   < > 21   CO2, I-STAT   --   --   --   --   --   --   --   --   --    < >  --    CREATININE mg/dL 0 53* 0 58* 0 46* 0 46* 0 57* 0 70 0 74   < > 0 65   < > 0 56*   GLUCOSE RANDOM mg/dL 147* 194* 177* 135 176* 176* 199*   < > 119   < > 84   POTASSIUM mmol/L 4 4 4 0 3 6 3 7 4 2 4 2 4 0   < > 3 8   < > 3 5   SODIUM mmol/L 149* 146 145 144 149* 152* 153*   < > 160*   < > 139   TOTAL BILIRUBIN mg/dL 0 21  --   --   --   --   --  0 16*  --   --   --  0 54    < > = values in this interval not displayed  Results from last 7 days   Lab Units 05/26/23  0542 05/25/23  0553 05/24/23  0558 05/23/23  0614 05/22/23  1526 05/21/23  1705   MAGNESIUM mg/dL 1 9 1 9 1 9 2 2 1 6 1 3*   PHOSPHORUS mg/dL 1 8* 2 0* 4 4 2 8 1 8* 3 1      Results from last 7 days   Lab Units 05/25/23  0553 05/24/23  0558 05/21/23  1705   INR  1 34* 1 40* 1 16   PTT seconds  --  29 31          Results from last 7 days   Lab Units 05/21/23  1705   LACTIC ACID mmol/L 0 7     ABG:  Results from last 7 days   Lab Units 05/23/23  1341   BASE EXC ART mmol/L -1 4   HCO3 ART mmol/L 22 8   PCO2 ART mm Hg 36 6   PH ART  7 412   PO2 ART mm Hg 145 1*   ABG SOURCE  Line, Arterial     VBG:  Results from last 7 days   Lab Units 05/26/23  0846 05/23/23  1341   BASE EXC XIMENA mmol/L 4 1  --    HCO3 XIMENA mmol/L 29 2  --    PCO2 XIMENA mm Hg 46 2  --    PH XIMENA  7 419*  --    PO2 XIMENA mm Hg 41 9  --    ABG SOURCE   --  Line, Arterial           Micro  Results from last 7 days   Lab Units 05/25/23  0913 05/22/23  0027 05/21/23  1735   BLOOD CULTURE   --  No Growth After 4 Days  No Growth After 4 Days  No Growth After 5 Days  No Growth After 5 Days  MRSA CULTURE ONLY  No Methicillin Resistant Staphlyococcus aureus (MRSA) isolated  --   --          Imaging:  I have personally reviewed pertinent reports        Intake and Output  I/O       05/25 0701 05/26 0700 05/26 0701 05/27 0700    I V  (mL/kg) 106 6 (2 6) 200 (4 9) "NG/     IV Piggyback 250 250    Feedings 960     Total Intake(mL/kg) 1846 6 (45 3) 450 (11)    Urine (mL/kg/hr) 2515 (2 6) 1100 (1 1)    Emesis/NG output 0     Stool 0     Total Output 2515 1100    Net -668 4 -650          Unmeasured Stool Occurrence 1 x             Height and Weights   Height: 4' 11\" (149 9 cm)  IBW (Ideal Body Weight): 43 2 kg  Body mass index is 18 18 kg/m²  Weight (last 2 days)     None            Nutrition       Diet Orders   (From admission, onward)             Start     Ordered    05/26/23 1515  Diet Regular; Pleasure Feed  Diet effective now        References:    Adult Nutrition Support Algorithm    RD Therapeutic Diet Order Protocol   Question Answer Comment   Diet Type Regular    Regular Pleasure Feed    RD to adjust diet per protocol?  No        05/26/23 1515                    Active Medications  Scheduled Meds:  Current Facility-Administered Medications   Medication Dose Route Frequency Provider Last Rate   • acetaminophen  650 mg Oral Q6H PRN Jetrojelio King, DO     • bisacodyl  10 mg Rectal Daily PRN Jurgen Dial PA-C     • fentaNYL  50 mcg/hr Intravenous Continuous Ang Espino Blush, DO 50 mcg/hr (05/26/23 2012)   • glycopyrrolate  0 1 mg Intravenous Q4H PRN Flora Gaines MD     • haloperidol lactate  2 mg Intravenous Q2H PRN Flora Gaines MD     • LORazepam  1 mg Intravenous Q10 Min PRN Flora Gaines MD     • morphine injection  4 mg Intravenous Q10 Min PRN Flora Gaines MD     • ondansetron  4 mg Intravenous Q6H PRN Patricia Dial PA-C       Continuous Infusions:  fentaNYL, 50 mcg/hr, Last Rate: 50 mcg/hr (05/26/23 2012)      PRN Meds:   acetaminophen, 650 mg, Q6H PRN  bisacodyl, 10 mg, Daily PRN  glycopyrrolate, 0 1 mg, Q4H PRN  haloperidol lactate, 2 mg, Q2H PRN  LORazepam, 1 mg, Q10 Min PRN  morphine injection, 4 mg, Q10 Min PRN  ondansetron, 4 mg, Q6H PRN        Allergies   Allergies   Allergen Reactions   • No Active Allergies  " "    ---------------------------------------------------------------------------------------  Advance Directive and Living Will:      Power of Attorney:    POLST:    ---------------------------------------------------------------------------------------  Care Time Delivered:   [facility], DO      Portions of the record may have been created with voice recognition software  Occasional wrong word or \"sound a like\" substitutions may have occurred due to the inherent limitations of voice recognition software    Read the chart carefully and recognize, using context, where substitutions have occurred    "

## 2023-05-28 NOTE — PROGRESS NOTES
Pastoral Care Progress Note    2023  Patient: Asad Pavon : 1970  Admission Date & Time: 2023  MRN: [id number] CSN: [id number]                     Chaplaincy Interventions Utilized:      23 1000   Clinical Encounter Type   Visited With Patient and family together   Routine Visit Follow-up   Crisis Visit Critical Care     Provided spiritual and emotional support to patient and family members   Also provided prayer

## 2023-05-28 NOTE — DISCHARGE SUMMARY
[address], [facility] 1970, 47 y o  female  MRN: [id number]    Unit/Bed#: -01 Encounter: [id number]  Primary Care Provider: Maggi Cho MD      Admission Date: 2023  Discharge Date: 23  Length of Stay: 7 days  Diagnosis:   Principal Problem:    Brain mass  Active Problems:    Encephalopathy    COPD (chronic obstructive pulmonary disease) ([address] )    Alcohol abuse, continuous    Gastroesophageal reflux disease with hiatal hernia    Hepatitis C antibody test positive    Depression  Resolved Problems:    * No resolved hospital problems  *        HOSPITAL COURSE:   Hospital Course:   47 y o  female admitted on 2023 for acute onset confusion with 1 month history of generalized weakness  During admission patient found to have several brain masses w/ mass effect and edema  Patient transferred to ICU for worsening mental status, requiring patient to be intubated  During a family discussion, the patient sister decided to make her comfort care  Patient to be transferred to hospice         PROCEDURES:     Procedures Performed:     Orders Placed This Encounter   Procedures   • Central Line   • Intubation   • POC Ocular US         SIGNIFICANT FINDINGS / ABNORMAL RESULTS:     Significant Findings/Abnormal Results with this admission:    EEG Video Monitoring 24 Hour    Result Date: 2023  Narrative: Table formatting from the original result was not included  Continuous Video EEG Monitoring Patient Name:  Graydon Olszewski  MRN: [id number] :  1970 File #: [id number] [id number] Age: 47 y o  Ordering Provider: Denise Ivan MD  Study Start: 2023 2321 Study End: 2023 1557            Study type: Continuous video EEG ICD 10 diagnosis: Encephalopathy, unspecified G93 40 ------------------------------------------------- Patient History:  This recording was observed in a 47 y o  female with multiple brain masses and associated vasogenic edema to evaluate for subclinical seizure as the cause of altered mental status  Medications include: Fentanyl Levetiracetam Gabapentin ------------------------------------------------- Description of Procedure: 32 channel digital recording with electrodes placed according to the International 10-20 system with continuous video, ECG, EOG and the possible addition of T1/T2 electrodes  This study was monitored intermittently by a monitoring technologist   The physician interpreting the study had access to the data throughout the recording  The recording was technically satisfactory  ------------------------------------------------- Results: Manual Review: The recording was obtained with the patient intubated and ventilated  No age or state appropriate activities were seen  Activities were asymmetric with lower amplitudes (especially left anterior) and slower activities on the left  There were abundant right hemisphere predominant 5-6 cps low to medium amplitude theta activities  There were frequent diffuse low amplitude mixed frequency delta activities  There was intermittent brief diffuse attenuation  Amplitudes mildly increased in response to external stimulation  There were nearly continuous left posterior 0 25-1 Hz lateralized periodic, often poorly formed, at times sharply contoured (maximal at P3/T5), at times with associated delta activity  There were rare left temporal sharp waves  Other findings: Samples of the single channel ECG demonstrated a regular rhythm  Events: No push buttons were activated  ------------------------------------------------- Interpretation: This prolonged, continuous video-EEG recording is abnormal  A background of mildly reactive theta/delta slowing suggests moderate nonspecific diffuse cerebral dysfunction  A paucity of faster activities on the left and voltage attenuation on the left suggest left hemisphere focal neuronal dysfunction   Nearly continuous left posterior lateralized periodic discharges suggest underlying acute/subacute structural neuronal dysfunction that is highly epileptogenic  Rare left temporal sharp waves suggest an additional region of focal epileptogenic potential  No seizures are present  Meagan Taylor MD  [facility], [address] [facility]     CT head wo contrast    Result Date: 5/26/2023  Narrative: CT BRAIN - WITHOUT CONTRAST INDICATION:   Neuro deficit, acute, stroke suspected Anisocoria Left dilated and fixed pupil  Multiple brain masses  Status post left frontal craniotomy 5/23/2023 for tumor resection  COMPARISON: 5/23/2023 CT and MR  TECHNIQUE:  CT examination of the brain was performed  In addition to axial images, sagittal and coronal 2D reformatted images were created and submitted for interpretation  Radiation dose length product (DLP) for this visit:  854 03 mGy-cm   This examination, like all CT scans performed in the [facility], was performed utilizing techniques to minimize radiation dose exposure, including the use of iterative  reconstruction and automated exposure control  IMAGE QUALITY:  Diagnostic  FINDINGS: PARENCHYMA: Similar left greater than right frontal and left temporal white matter lucencies most compatible with vasogenic edema  Mildly improved left inferior frontal parenchymal and extra-axial hemorrhage and significantly improved pneumocephalus  New left frontal, probably left inferior temporal, right occipitotemporal and small left medial occipital lucencies with loss of gray-white matter differentiation  Similar small left hypodense superior cerebellar nodule and left pontine foci of hemorrhage  Similar approximately 0 8 cm left to right midline shift  VENTRICLES AND EXTRA-AXIAL SPACES: Persistent and mildly increased left lateral and third ventricle effacement and new partial effacement of the suprasellar cistern  Similar mild right lateral ventricle enlargement suggesting trapping   No evidence of transependymal fluid leak  VISUALIZED ORBITS: Within normal limits  PARANASAL SINUSES: Right mucosal thickening  CALVARIUM AND EXTRACRANIAL SOFT TISSUES: Similar left frontotemporal craniotomy  No acute changes  Mastoid air cells are well aerated  The study was marked in [facility] for immediate notification  Impression: Compared to 3 days ago, new bilateral areas of lucency with loss of gray-white matter differentiation most compatible with infarcts    Mildly increased ventricle and basilar cistern effacement with otherwise stable left to right midline shift and ventricle size and configuration  Persistent postoperative changes with mildly improved hemorrhage and decreasing pneumocephalus  Similar small brainstem hemorrhage  Workstation performed: [id number]     MRI brain w wo contrast    Result Date: 5/24/2023  Narrative: MRI BRAIN WITH AND WITHOUT CONTRAST INDICATION: Postoperative follow-up  COMPARISON: 5/23/2023 TECHNIQUE: Multiplanar, multisequence imaging of the brain was performed before and after gadolinium administration  IV Contrast:  4 mL of Gadobutrol injection (SINGLE-DOSE) IMAGE QUALITY:   Diagnostic  FINDINGS: BRAIN PARENCHYMA: Postsurgical change from left frontoparietal craniotomy and resection of the left frontal masses  Fluid and blood products in the resection cavities  Restricted diffusion in the left frontal region surrounding the resection cavities is consistent with an acute infarct  Few punctate foci of restricted diffusion in the bilateral occipital lobes are new as is a punctate focus in the left cerebellar hemisphere, also suggesting acute infarct  Blood products within and along the margins of the resection cavities  No evidence of superimposed enhancement to suggest residual tumor  Stable enhancing lesions in the bilateral cerebral hemispheres, the largest in the right temporal lobe measuring 1 1 cm   Vasogenic edema in the left frontoparietal temporal region has significantly decreased  Mild perilesional edema in the bilateral cerebral hemispheres is stable  Left to right midline shift of 8 mm, stable  Mild left subfalcine herniation  Stable foci of susceptibility blooming artifact in the left aspect of the pontomedullary junction consistent with chronic microhemorrhage  Punctate focus in the left posterior temporal region is also stable  VENTRICLES: Partial effacement of the left greater than right lateral and third ventricles is not significantly changed  Periventricular T2/FLAIR hyperintensity along the occipital horns is stable and could represent transependymal flow of CSF  Small extra-axial collection subjacent to the left frontoparietal craniotomy measuring up to 4 mm in thickness, containing gas and fluid  SELLA AND PITUITARY GLAND:  Normal  ORBITS:  Normal  PARANASAL SINUSES: Mild mucosal thickening  VASCULATURE:  Evaluation of the major intracranial vasculature demonstrates appropriate flow voids  CALVARIUM AND SKULL BASE: Postsurgical change from left frontoparietal craniotomy EXTRACRANIAL SOFT TISSUES: Gas and fluid in the subgaleal soft tissues overlying the craniotomy 1  Expected postsurgical change from resection of left frontal lesions without evidence of residual tumor  2  Decreased vasogenic edema in the left frontotemporal region  Stable left to right midline shift of 8 mm  3  Remainder of lesions in the bilateral cerebral hemispheres and perilesional edema are stable  4  Focal infarct along the left frontal resection cavity and small infarcts in the bilateral occipital lobes and left cerebellar hemisphere  5  Stable small extra-axial collection of gas and fluid subjacent to the craniotomy  6  Stable partial effacement of the ventricles and presumed transependymal flow of CSF along the occipital horns   Workstation performed: [id number]     Echo complete w/ contrast if indicated    Result Date: 5/23/2023  Narrative: •  Left Ventricle: Left ventricular cavity size is normal  Wall thickness is mildly increased  The left ventricular ejection fraction is 70%  Systolic function is normal  Although no diagnostic regional wall motion abnormality was identified, this possibility cannot be completely excluded on the basis of this study  Diastolic function is mildly abnormal, consistent with grade I (abnormal) relaxation  •  Tricuspid Valve: There is mild regurgitation  Technically difficult study     CT head wo contrast    Result Date: 5/23/2023  Narrative: CT BRAIN - WITHOUT CONTRAST INDICATION:   follow up tumor resection prior to extubation  COMPARISON: 5/23/2023 TECHNIQUE:  CT examination of the brain was performed  Multiplanar 2D reformatted images were created from the source data  Radiation dose length product (DLP) for this visit:  897 63 mGy-cm   This examination, like all CT scans performed in the [facility], was performed utilizing techniques to minimize radiation dose exposure, including the use of iterative  reconstruction and automated exposure control  IMAGE QUALITY:  Diagnostic  FINDINGS: PARENCHYMA: The patient is status post resection of 2 large metastatic lesions within the anterior left frontal lobe as well as more inferior left frontal lobe near the opercular region  There is a small amount of hemorrhage within the operative bed as well as gas in the inferior left frontal lobe  There is also a small amount of hemorrhage within the left frontal operative cavity  There is pneumocephalus  There is a small amount of hemorrhage within the left subdural space  There is extensive vasogenic edema again noted within the left and right cerebral hemispheres  There is 9 mm rightward midline shift, similar to the prior examination  Small hemorrhagic foci noted within the left midbrain/monica junction, similar to prior MRI as correlated with susceptibility weighted imaging   There is a focus of low attenuation within the left cerebellum, new since prior MRI and CT suspicious for a lacunar infarct  Please refer to series 400, image 71 there are also small foci of hemorrhage noted within the left lateral aspect of the midbrain, new since prior CT and otherwise grossly stable since recent MRI with corresponding foci of susceptibility  Correlate with prior MRI for additional findings related to multiple metastatic lesions and extensive edema  VENTRICLES AND EXTRA-AXIAL SPACES: Stable effacement of the left lateral ventricle with partial entrapment of the right lateral ventricle  Findings are stable  There is transependymal flow of CSF  There is overall improved effacement of the suprasellar cistern as well as uncal herniation since the prior examination  VISUALIZED ORBITS: Normal visualized orbits  PARANASAL SINUSES: Normal visualized paranasal sinuses  CALVARIUM AND EXTRACRANIAL SOFT TISSUES:  Normal      Impression: Status post left-sided craniotomy for the resection of 2 dominant metastatic lesions with expected postoperative hemorrhage and pneumocephalus in the operative bed  Small foci of Duret type hemorrhage within the left brainstem likely sequela of previously seen uncal herniation, which has improved  Findings appear new since prior CT and grossly stable since recent MRI  Note is also made of a new left superior cerebellar lacunar infarct, new since prior exams  Grossly stable rightward midline shift with partial entrapment of the right lateral ventricle and transependymal flow of CSF  The study was marked in [facility] for immediate notification  Workstation performed: [id number]     US bedside procedure    Result Date: 5/23/2023  Narrative: 1 2 840 [id number]  3 826 [id number]  1 1    XR chest portable    Result Date: 5/23/2023  Narrative: CHEST INDICATION:   central line  COMPARISON: CT scan chest from yesterday EXAM PERFORMED/VIEWS:  XR CHEST PORTABLE FINDINGS: Right IJ catheter terminating approximately 2 cm beyond the cavoatrial junction   Endotracheal tube in satisfactory position terminating 2 cm above the zach  Cardiomediastinal silhouette appears unremarkable  Background emphysema  Known left lower lobe mass overlies the left hilum  No pneumothorax  Osseous structures appear within normal limits for patient age  Impression: Background emphysema  Known left lower lobe mass overlies the left hilum  No pneumothorax after right IJ central venous catheter placement  Workstation performed: [id number]     MRI brain w wo contrast    Result Date: 5/23/2023  Narrative: MRI BRAIN WITH AND WITHOUT CONTRAST INDICATION: abnormal ct    lung mass identified on CAT scan of the chest  Abnormal CT of the brain with suspected metastasis  COMPARISON: CT brain dated 5/22/2023 TECHNIQUE: Multiplanar, multisequence imaging of the brain was performed before and after gadolinium administration  IV Contrast:  4 mL of Gadobutrol injection (SINGLE-DOSE) IMAGE QUALITY:   Diagnostic  FINDINGS: BRAIN PARENCHYMA: There are multiple enhancing masses identified within the brain parenchyma, the largest of which is centered within the left insular region where there is a complex bilobed cystic mass with irregular peripheral enhancement and fairly extensive surrounding vasogenic edema extending into the basal ganglia, frontal lobe and to a lesser degree into the temporal lobe  This measures 3 9 cm in long axis, 2 8 cm in short axis  Multiple additional masses are identified including a more anterior superior left frontal lobe mass demonstrating irregular ring enhancement with a focal area of thickening along its anterior margin measuring 2 9 x 2 0 cm  Small ring-enhancing lesion within the right posterior inferior temporal lobe measuring 1 1 cm in long axis  Subcentimeter enhancing lesions are seen within the inferior medial left frontal lobe on series 13 image 62, right anterior medial frontal lobe on series 13 image 81 and image 85   Small left frontal lobe lesion seen on image 86 and a small right anterior paramedian occipital lobe lesion seen on image 79  Small enhancing lesion within the left occipital lobe on image 70  Small ring-enhancing lesion identified within the anterior medial left parietal lobe on image 88  The bulk of the vasogenic edema is seen within the left frontal lobe and basal ganglia causing significant mass effect with effacement of the frontal horn and anterior body of the left lateral ventricle and left to right shift  Shift measures approximately 8 mm  Partial effacement of the suprasellar cistern and mild mass effect upon the left middle cerebral peduncle of the upper brainstem with edema tracking inferiorly through the basal ganglia into the upper brainstem  VENTRICLES: No obstructive hydrocephalus  No abnormal enhancement within the ventricles  SELLA AND PITUITARY GLAND:  Normal  ORBITS:  Normal  PARANASAL SINUSES: Minor mucosal thickening of the paranasal sinuses  VASCULATURE:  Evaluation of the major intracranial vasculature demonstrates appropriate flow voids  CALVARIUM AND SKULL BASE:  Normal  EXTRACRANIAL SOFT TISSUES:  Normal      Impression: Multiple enhancing lesions identified within the brain parenchyma consistent with metastatic disease  The largest lesions are seen within the left hemisphere involving the insular region and anterior left frontal lobe with irregular peripheral enhancement and extensive surrounding vasogenic edema  Multiple additional smaller lesions are scattered within the brain parenchyma  The edema and mass effect results in approximately 8 mm of left to right shift with partial effacement of the suprasellar cistern  Workstation performed: [id number]     CT head wo contrast    Result Date: 5/22/2023  Narrative: CT BRAIN - WITHOUT CONTRAST INDICATION:   Mental status change, persistent or worsening change in mental status  COMPARISON:  None  TECHNIQUE:  CT examination of the brain was performed    Multiplanar 2D reformatted images were created from the source data  Radiation dose length product (DLP) for this visit:  1406 68 mGy-cm   This examination, like all CT scans performed in the [facility], was performed utilizing techniques to minimize radiation dose exposure, including the use of iterative reconstruction and automated exposure control  IMAGE QUALITY:  Diagnostic  FINDINGS: PARENCHYMA: Again noted are multiple intracranial masses with surrounding vasogenic edema with mass effect, compression of the left lateral ventricle and midline shift  There is a left frontal region mass which measures about 2 4 cm  There is a mass in the left frontal region which measures about 2 1 cm  There is an additional lesion in the splenium on the right side, measuring 7 mm  There is left-to-right midline shift of about 1 5 cm, stable  There is associated uncal herniation with effacement of the suprasellar, perimesencephalic cistern  There is subfalcine herniation  Overall appearance not significantly changed High attenuation seen within the left insular region may be due to sulcal hemorrhage or due to metastatic disease or due to compression of the adjacent sulcal spaces VENTRICLES AND EXTRA-AXIAL SPACES: Compression of the left lateral ventricle, midline shift, subfalcine and uncal herniation seen There is effacement of the suprasellar and perimesencephalic cistern VISUALIZED ORBITS: Normal visualized orbits  PARANASAL SINUSES: Normal visualized paranasal sinuses   CALVARIUM AND EXTRACRANIAL SOFT TISSUES:  Normal      Impression: Metastatic disease with multiple intracranial lesions with surrounding vasogenic edema with subfalcine herniation, uncal herniation and left-to-right shift of about 1 5 cm with stable midline shift and mass effect High attenuation seen within the left insular cistern, unchanged due to perilesional hemorrhage or leptomeningeal disease or sulcal/extra-axial hemorrhage, stable I personally discussed this study with LYRIC AMARO on 5/22/2023 5:04 PM  Workstation performed: [id number]     CT chest abdomen pelvis w contrast    Result Date: 5/22/2023  Narrative: CT CHEST, ABDOMEN AND PELVIS WITH IV CONTRAST INDICATION:   Brain mass r/o mets  COMPARISON: None  TECHNIQUE: CT examination of the chest, abdomen and pelvis was performed  Multiplanar 2D reformatted images were created from the source data  This examination, like all CT scans performed in the [facility], was performed utilizing techniques to minimize radiation dose exposure, including the use of iterative reconstruction and automated exposure control  Radiation dose length product (DLP) for this visit:  694 mGy-cm IV Contrast:  85 mL of iohexol (OMNIPAQUE) Enteric Contrast: Enteric contrast was not administered  FINDINGS: CHEST LUNGS: Moderate upper lobe predominant emphysema  3 by 2 8 x 2 6 cm mass in the superior segment of the left lower lobe which abuts the major fissure and posterior aspect of the left pulmonary artery, highly suspicious for primary lung cancer  Right middle lobe atelectasis, with no definite associated mass  3 mm nodule in the lateral aspect of the superior segment of the left lower lobe  (3, 110)  Few additional scattered subcentimeter lung nodules bilaterally  PLEURA:  Unremarkable  HEART/GREAT VESSELS: Heart is unremarkable for patient's age  No thoracic aortic aneurysm  MEDIASTINUM AND TAMEKA:  Unremarkable  CHEST WALL AND LOWER NECK:  Unremarkable  ABDOMEN LIVER/BILIARY TREE:  Unremarkable  GALLBLADDER:  No calcified gallstones  No pericholecystic inflammatory change  SPLEEN:  Unremarkable  PANCREAS:  Unremarkable  ADRENAL GLANDS:  Unremarkable  KIDNEYS/URETERS: Bilateral renal cortical scarring  No hydronephrosis or obstructing calculus  STOMACH AND BOWEL: Multiple colonic diverticula  Bowel otherwise normal  APPENDIX:  No findings to suggest appendicitis  ABDOMINOPELVIC CAVITY:  No ascites  No pneumoperitoneum  No lymphadenopathy  VESSELS: Atherosclerotic changes are present  No evidence of aneurysm  PELVIS REPRODUCTIVE ORGANS:  Unremarkable for patient's age  URINARY BLADDER: Collapsed around a Fatima catheter  Grossly unremarkable  ABDOMINAL WALL/INGUINAL REGIONS:  Unremarkable  OSSEOUS STRUCTURES: No acute fracture or destructive osseous lesion  Spinal degenerative changes are noted  Multiple healed left-sided rib fractures with callus formation  Impression: 3 x 2 8 x 2 6 cm mass superior segment of the left lower lobe highly suspicious for primary lung cancer  Additional scattered bilateral subcentimeter nodules are indeterminate  Upper lobe predominant emphysema  No metastatic disease in the abdomen or pelvis  The study was marked in [facility] for immediate notification  Workstation performed: [id number]     XR hip/pelv 2-3 vws right    Result Date: 5/22/2023  Narrative: RIGHT HIP INDICATION:   right hip pain  COMPARISON: 11/16/2022 VIEWS:  XR HIP/PELV 2-3 VWS RIGHT W PELVIS IF PERFORMED Images: 3 FINDINGS: There is no acute fracture or dislocation  No significant hip degenerative changes  No lytic or blastic osseous lesion  Soft tissues are unremarkable  The visualized lumbar spine is unremarkable  Impression: No acute osseous abnormality  Workstation performed: [id number]     XR chest 1 view portable    Result Date: 5/22/2023  Narrative: CHEST INDICATION:   COPD  COMPARISON: 1/10/2019 EXAM PERFORMED/VIEWS:  XR CHEST PORTABLE FINDINGS: Monitoring leads and clips project over the chest  Cardiomediastinal silhouette appears unremarkable  There is a new airspace opacity in the region of the right middle lobe  No pleural effusion or pneumothorax  There is prominence of the left hilar region requiring CT follow-up  Old left rib fractures  Impression: Right middle lobe opacity may represent pneumonia or atelectasis  Left hilar mass  The study was marked in [facility] for immediate notification    Workstation performed: [id number]     CT head without contrast    Result Date: 5/21/2023  Narrative: CT BRAIN - WITHOUT CONTRAST INDICATION:   Delirium confusion  COMPARISON:  None  TECHNIQUE:  CT examination of the brain was performed  Multiplanar 2D reformatted images were created from the source data  Radiation dose length product (DLP) for this visit:  23-14-20-09 mGy-cm   This examination, like all CT scans performed in the [facility], was performed utilizing techniques to minimize radiation dose exposure, including the use of iterative reconstruction and automated exposure control  IMAGE QUALITY:  Diagnostic  FINDINGS: PARENCHYMA: Multiple intracranial mass lesions are seen in the left greater than right hemispheres including dominant left frontal lesion measuring approximately 2 8 x 2 5 cm and left basal ganglia lesion measuring approximately 2 2 x 3 8 cm  Additional smaller lesions seen in the right hemisphere for example 9 mm lesion paramedian right frontal lobe and paramedian right parietal lobe/splenium  There is extensive associated vasogenic edema in the left greater than right hemispheres, predominating in the  left frontal parietal and anterior temporal regions  There is significant associated mass effect with effacement of the left lateral ventricle and 1 cm rightward subfalcine herniation  Additionally there is associated effacement of the suprasellar cistern and perimesencephalic cistern  VENTRICLES AND EXTRA-AXIAL SPACES: There is effacement of the left lateral ventricle with asymmetric dilatation of the right lateral ventricle temporal horn compatible with ventricular entrapment/obstructive hydrocephalus  VISUALIZED ORBITS: Normal visualized orbits  PARANASAL SINUSES: Normal visualized paranasal sinuses   CALVARIUM AND EXTRACRANIAL SOFT TISSUES:  Normal      Impression: Multiple (at least 4) intracranial mass lesions as above including dominant left frontal/basal ganglia lesions with significant associated vasogenic edema in "the left greater than right hemispheres highly concerning for metastases  Significant associated  mass effect including 1 cm rightward subfalcine herniation  Nonspecific marginal hyperattenuation suspicious for small amount of perilesional hemorrhage  Associated ventricular entrapment/obstructive hydrocephalus  Urgent neurosurgery consult and follow-up gadolinium-enhanced MRI of the brain advised  Above findings discussed with Dr Mariah Nicole at 6:30 p m  on 5/21/2023  Workstation performed: [id number]         VITALS ON DISCHARGE DATE:     Vitals  Blood Pressure: 105/74 (05/28/23 1114)  Temperature: 98 5 °F (36 9 °C) (05/28/23 1114)  Temp Source: Axillary (05/28/23 1114)  Pulse: (!) 118 (05/28/23 0900)  Respirations: 17 (05/28/23 1114)  SpO2: (!) 86 % (05/28/23 0900)  Height: 4' 11\" (149 9 cm) (05/23/23 1610)  Weight - Scale: 40 8 kg (90 lb 0 1 oz) (05/24/23 0600)    Temp:  [98 5 °F (36 9 °C)] 98 5 °F (36 9 °C)  HR:  [118] 118  Resp:  [17-19] 17  BP: (105)/(74) 105/74    Weight (last 2 days) before discharge     None          No intake or output data in the 24 hours ending 05/28/23 1920    Invasive Devices     Central Venous Catheter Line  Duration           CVC Central Lines 05/22/23 Triple 16cm 6 days          Drain  Duration           Urethral Catheter Temperature probe 16 Fr  6 days    NG/OG/Enteral Tube Orogastric 18 Fr Right mouth 5 days                  PHYSICAL EXAM ON DAY OF DISCHARGE:   See progress note     FOLLOW-UP APPOINTMENTS / INSTRUCTION :     Important Physician Related Follow Up:     No follow-up provider specified  Comfirmed future (up-coming) appointments:  Future Appointments   Date Time Provider Danyel Nava   5/29/2023  7:00 AM Sea Barrett RN VN [facility]   10/24/2023 12:30 PM MD FELTON Guerrier [facility]       Discharge instructions/Information to patient and family:   See after visit summary (AVS) for information provided to patient and family        Provisions for " Follow-Up Care:  See after visit summary for information related to follow-up care and any pertinent home health orders  DISPOSITION:     Disposition: hospice     Discharge Statement   I spent 30  minutes discharging the patient  This time was spent on the day of discharge  I had direct contact with the patient on the day of discharge  Additional documentation is required if more than 30 minutes were spent on discharge             Flor Ocampo MD  PGY-1, Family Medicine  05/28/23  7:20 PM

## 2023-05-28 NOTE — CASE MANAGEMENT
Case Management Discharge Planning Note    Patient name Malia Monsalve  Location /-94 MRN [id number]  : 1970 Date 2023       Current Admission Date: 2023  Current Admission Diagnosis:Brain mass   Patient Active Problem List    Diagnosis Date Noted   • Brain mass 2023   • COPD (chronic obstructive pulmonary disease) ([facility] ) 2023   • Encephalopathy 2023   • Alcohol abuse, continuous 2023   • Depression    • Other dysphagia 2019   • Hepatitis C antibody test positive 2019   • Simple chronic bronchitis ([facility] ) 12/10/2018   • Gastroesophageal reflux disease with hiatal hernia 2017   • Asthma 10/25/2017   • Uncontrolled moderate persistent asthma 10/25/2017      LOS (days): 7  Geometric Mean LOS (GMLOS) (days): 6 60  Days to GMLOS:-0 1     OBJECTIVE:  Risk of Unplanned Readmission Score: 10 08         Current admission status: Inpatient   Preferred Pharmacy:   [address] [place] [address]  Phone: [phone] Fax: [phone]    Primary Care Provider: Nathalie Porter MD    Primary Insurance: [address] [place] [address]  Secondary Insurance:     DISCHARGE DETAILS:          Comments - Freedom of Choice: Pt accepted for home hospice CM spoke with [facility] order for 1x dose IV pain meds for transport being ordered  and d/c IV drip for transport  Out of hospital DNR signed       2 Pm transport arranged with [facility]

## 2023-05-28 NOTE — ASSESSMENT & PLAN NOTE
· 1 month history of waxing/waning weakness  · Today patient with confusion and EMS called   · [address] revealed: Multiple (at least 4) intracranial mass lesions as above including dominant left frontal/basal ganglia lesions with significant associated vasogenic edema in the left greater than right hemispheres highly concerning for metastases  Significant associated mass effect including 1 cm rightward subfalcine herniation  Nonspecific marginal hyperattenuation suspicious for small amount of perilesional hemorrhage  Associated ventricular entrapment/obstructive hydrocephalus    · Neurosurgery signed off  · Continue Keppra 500mg BID   · Comfort measures only

## 2023-05-28 NOTE — ASSESSMENT & PLAN NOTE
· Continue home regimen:   • Smoking cessation  • Trelegy Ellipta 100, 1 inhalation once a day  • Albuterol rescue inhaler 2 inhalations 4 times a day as needed  • Fluticasone nasal spray 2 sprays to each nostril once a day  • Astelin nasal spray 2 sprays to each nostril twice a day    • Follows with Dr Ward Zaidi as outpatient   • Nicotine patch ordered

## 2023-05-28 NOTE — CASE MANAGEMENT
Case Management Discharge Planning Note    Patient name Kalin Camarena  Location ICU 09/ICU 09 MRN [id number]  : 1970 Date 2023       Current Admission Date: 2023  Current Admission Diagnosis:Brain mass   Patient Active Problem List    Diagnosis Date Noted   • Brain mass 2023   • COPD (chronic obstructive pulmonary disease) ([facility] [address] ) 2023   • Encephalopathy 2023   • Alcohol abuse, continuous 2023   • Depression    • Other dysphagia 2019   • Hepatitis C antibody test positive 2019   • Simple chronic bronchitis ([facility] [address] ) 12/10/2018   • Gastroesophageal reflux disease with hiatal hernia 2017   • Asthma 10/25/2017   • Uncontrolled moderate persistent asthma 10/25/2017      LOS (days): 7  Geometric Mean LOS (GMLOS) (days): 6 60  Days to GMLOS:0     OBJECTIVE:  Risk of Unplanned Readmission Score: 10 05         Current admission status: Inpatient   Preferred Pharmacy:   [facility]/pharmacy #[id number]- [address], [place] - [address]  [address]  [address] [place] [address]  Phone: [phone] Fax: [phone]    Primary Care Provider: Haeth Fuentes MD    Primary Insurance: Neftali Skaggs  Secondary Insurance:     DISCHARGE DETAILS:    Discharge planning discussed with[de-identified] Pt's sisters Sabina Romo and Pt's son Paddy Zacarias (via Facetime)  Freedom of Choice: Yes  Comments - Freedom of Choice: discussed with Isaaclebron Deann he does not want to make decision reguarding his mother's care  Toshia contaced Paddy Zacarias via [employer]  Paddy Zacarias deferred all decisions to [employer]  Osman Cables wants to take the patient to her home under hospice  CM offered Hospice choice  CM explained PA   to family  Vivi's address is [address] [place] [address]  Family requested [address] [place] [address]  referral made  Cm contacted [facility]  She will meet with family    CM contacted family/caregiver?: Yes  Were Treatment Team discharge recommendations reviewed with patient/caregiver?: Yes  Did patient/caregiver verbalize understanding of patient care needs?: Yes  Were patient/caregiver advised of the risks associated with not following Treatment Team discharge recommendations?: Yes    Contacts  Patient Contacts: Krystal Latham  Relationship to Patient[de-identified] Family  Contact Method: Phone  Phone Number: [phone]  Reason/Outcome: Continuity of Care, Emergency Contact, Discharge [address]         Is the patient interested in [facility] at discharge?: No    DME Referral Provided  Referral made for DME?: No    Other Referral/Resources/Interventions Provided:  Interventions: Hospice         Treatment Team Recommendation: Hospice  Discharge Destination Plan[de-identified] Hospice  Transport at Discharge : [facility]

## 2023-05-28 NOTE — PROGRESS NOTES
[facility]  Progress Note  Name: Mar Stockton  MRN: [id number]  Unit/Bed#: ICU 09 I Date of Admission: 5/21/2023   Date of Service: 5/28/2023 I Hospital Day: 7    Assessment/Plan   * Brain mass  Assessment & Plan  · 1 month history of waxing/waning weakness  · Today patient with confusion and EMS called   · [facility] revealed: Multiple (at least 4) intracranial mass lesions as above including dominant left frontal/basal ganglia lesions with significant associated vasogenic edema in the left greater than right hemispheres highly concerning for metastases  Significant associated mass effect including 1 cm rightward subfalcine herniation  Nonspecific marginal hyperattenuation suspicious for small amount of perilesional hemorrhage  Associated ventricular entrapment/obstructive hydrocephalus  · Neurosurgery signed off  · Continue Keppra 500mg BID   · Comfort measures only      Encephalopathy  Assessment & Plan  · Likely due to above     COPD (chronic obstructive pulmonary disease) ([facility] [address] )  Assessment & Plan  · Continue home regimen:   • Smoking cessation  • Trelegy Ellipta 100, 1 inhalation once a day  • Albuterol rescue inhaler 2 inhalations 4 times a day as needed  • Fluticasone nasal spray 2 sprays to each nostril once a day  • Astelin nasal spray 2 sprays to each nostril twice a day    • Follows with Dr Rosario Avitia as outpatient   • Nicotine patch ordered     Alcohol abuse, continuous  Assessment & Plan  · Drinks 6 beers daily   · CIWA   · Seizure precautions     Depression  Assessment & Plan  · Home regimen:   · Paxil 10mg daily   · Continue as needed for comfort    Hepatitis C antibody test positive  Assessment & Plan  · Hep C precautions   · Hep C antibody reactive, Haptoglobin 243,  in 2019   · Did not receive treatment   · LFTs within normal range     Gastroesophageal reflux disease with hiatal hernia  Assessment & Plan  · Continue home PPI              ICU Core Measures     A: Assess, Prevent, and Manage Pain · Has pain been assessed? Yes  · Need for changes to pain regimen? No   B: Both SAT/SAT  · N/A   C: Choice of Sedation · RASS Goal: 0 Alert and Calm  · Need for changes to sedation or analgesia regimen? NA   D: Delirium · CAM-ICU: Positive   E: Early Mobility  · Plan for early mobility? NA   F: Family Engagement · Plan for family engagement today? NA       Review of Invasive Devices: Fatima Plan: end of life care  Central access plan: Comfort care      Prophylaxis:  VTE Contraindicated secondary to: CC   Stress Ulcer  not ordered       Subjective   HPI/24hr events: Patient with brain herniation  Family wishes to proceed with comfort measures only         Objective                            Vitals I/O      Most Recent Min/Max in 24hrs   Temp (!) 100 8 °F (38 2 °C) No data recorded   Pulse 82 No data recorded   Resp (!) 33 No data recorded   /76 No data recorded   O2 Sat 99 % No data recorded    No intake or output data in the 24 hours ending 05/28/23 0222      No diet orders on file     Invasive Monitoring Physical exam   Neuro Invasive Monitoring   Most Recent  Min/Max in 24hrs    GCS 6  No data recorded    ICP     No data recorded    CPP (cuff)    No data recorded    CPP(ariella)    No data recorded   CVP   No data recorded    Physical Exam  Constitutional:       Comments: Comfortable              Diagnostic Studies      EKG: N/a    Imaging:  I have personally reviewed pertinent films in PACS     Medications:  Scheduled PRN      acetaminophen, 650 mg, Q6H PRN  bisacodyl, 10 mg, Daily PRN  glycopyrrolate, 0 1 mg, Q4H PRN  haloperidol lactate, 2 mg, Q2H PRN  LORazepam, 1 mg, Q10 Min PRN  morphine injection, 4 mg, Q10 Min PRN  ondansetron, 4 mg, Q6H PRN       Continuous    fentaNYL, 50 mcg/hr, Last Rate: 50 mcg/hr (05/28/23 0220)         Labs:    CBC    Recent Labs     05/26/23 0542   HCT 30 6*   HGB 9 9*      WBC 9 82     BMP    Recent Labs     05/26/23 0542   AGAP -2* BUN 16   CALCIUM 9 1   *   CO2 30   CREATININE 0 53*   K 4 4   SODIUM 149*       Coags    No recent results     Additional Electrolytes  Recent Labs     05/26/23  0542   MG 1 9   PHOS 1 8*          Blood Gas    No recent results  Recent Labs     05/26/23  0846   BEVEN 4 1   SMK9AVK 29 2   LGB9HFQ 46 2   PHVEN 7 419*   PO2VEN 41 9    LFTs  Recent Labs     05/26/23  0542   ALB 2 7*   ALKPHOS 57   ALT 12   AST 43   TBILI 0 21       Infectious  No recent results  Glucose  Recent Labs     05/26/23  0542   GLUC 147*               Critical Care Time Delivered: Upon my evaluation, this patient had a high probability of imminent or life-threatening deterioration due to brain herniation, which required my direct attention, intervention, and personal management  I have personally provided 0 minutes of critical care time, exclusive of procedures, teaching, family meetings, and any prior time recorded by providers other than myself       Emily Nixon PA-C
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
## 2023-05-30 ENCOUNTER — HOME CARE VISIT (OUTPATIENT)
Dept: HOME HOSPICE | Facility: HOSPICE | Age: 53
End: 2023-05-30
Payer: COMMERCIAL

## 2023-05-31 ENCOUNTER — HOME CARE VISIT (OUTPATIENT)
Dept: HOME HOSPICE | Facility: HOSPICE | Age: 53
End: 2023-05-31

## 2023-08-18 ENCOUNTER — TELEPHONE (OUTPATIENT)
Dept: FAMILY MEDICINE CLINIC | Facility: CLINIC | Age: 53
End: 2023-08-18

## (undated) DEVICE — DRAPE MICROSCOPE ARMATEC 54 X 150IN F/LEICA

## (undated) DEVICE — 3M™ IOBAN™ 2 ANTIMICROBIAL INCISE DRAPE 6650EZ: Brand: IOBAN™ 2

## (undated) DEVICE — DRAPE SHEET THREE QUARTER

## (undated) DEVICE — MARKER REFLECTIVE RADIOPAQUE SPHERE

## (undated) DEVICE — GLOVE INDICATOR PI UNDERGLOVE SZ 8 BLUE

## (undated) DEVICE — DISPOSABLE SLIM BIPOLAR FORCEPS, NON-STICK,: Brand: SPETZLER-MALIS

## (undated) DEVICE — TOOL MR8-9MH30 MR8 9CM MATCH 3MM: Brand: MIDAS REX MR8

## (undated) DEVICE — PACK CRANIOTOMY PBDS RF

## (undated) DEVICE — INTENDED FOR TISSUE SEPARATION, AND OTHER PROCEDURES THAT REQUIRE A SHARP SURGICAL BLADE TO PUNCTURE OR CUT.: Brand: BARD-PARKER ® CARBON RIB-BACK BLADES

## (undated) DEVICE — RANEY SCALP CLIP STERILE: Brand: AESCULAP

## (undated) DEVICE — SUT SILK 2-0 SH CR/8 18 IN C012D

## (undated) DEVICE — SUT PROLENE 2-0 CT-2 30 IN 8411H

## (undated) DEVICE — TUBING SUCTION 5MM X 12 FT

## (undated) DEVICE — LIGHT HANDLE COVER SLEEVE DISP BLUE STELLAR

## (undated) DEVICE — GLOVE SRG BIOGEL 7.5

## (undated) DEVICE — KERLIX BANDAGE ROLL: Brand: KERLIX

## (undated) DEVICE — CHLORAPREP HI-LITE 26ML ORANGE

## (undated) DEVICE — SUT NUROLON 4-0 TF CR/8 18 IN C584D

## (undated) DEVICE — HEMOSTATIC MATRIX SURGIFLO 8ML W/THROMBIN

## (undated) DEVICE — Device: Brand: IQ SYSTEM

## (undated) DEVICE — CURITY STRETCH BANDAGE: Brand: CURITY

## (undated) DEVICE — BETADINE OINTMENT FOIL PACK

## (undated) DEVICE — SURGICEL 4 X 8

## (undated) DEVICE — PROXIMATE SKIN STAPLERS (35 WIDE) CONTAINS 35 STAINLESS STEEL STAPLES (FIXED HEAD): Brand: PROXIMATE

## (undated) DEVICE — TOOL MR8-F2/7TA23 MR8 F2/7CM TAPER 2.3MM: Brand: MIDAS REX MR8

## (undated) DEVICE — TELFA ADHESIVE ISLAND DRESSING: Brand: TELFA

## (undated) DEVICE — DISPOSABLE EQUIPMENT COVER: Brand: SMALL TOWEL DRAPE

## (undated) DEVICE — UTILITY MARKER,BLACK WITH LABELS: Brand: DEVON

## (undated) DEVICE — MAYFIELD® DISPOSABLE ADULT SKULL PIN (PLASTIC BASE): Brand: MAYFIELD®

## (undated) DEVICE — ANTIBACTERIAL VIOLET BRAIDED (POLYGLACTIN 910), SYNTHETIC ABSORBABLE SUTURE: Brand: COATED VICRYL

## (undated) DEVICE — PAD GROUNDING ADULT

## (undated) DEVICE — OCCLUSIVE GAUZE STRIP,3% BISMUTH TRIBROMOPHENATE IN PETROLATUM BLEND: Brand: XEROFORM

## (undated) DEVICE — TRAY FOLEY 16FR URIMETER SURESTEP

## (undated) DEVICE — DRAPE INTESTINAL ISOLATION BAG

## (undated) DEVICE — SPONGE SCRUB 4 PCT CHLORHEXIDINE

## (undated) DEVICE — INTENDED FOR TISSUE SEPARATION, AND OTHER PROCEDURES THAT REQUIRE A SHARP SURGICAL BLADE TO PUNCTURE OR CUT.: Brand: BARD-PARKER SAFETY BLADES SIZE 10, STERILE

## (undated) DEVICE — DRAIN JACKSON PRATT 10FR 1/8END: Brand: CARDINAL HEALTH

## (undated) DEVICE — PETRI DISH STERILE

## (undated) DEVICE — JACKSON-PRATT 100CC BULB RESERVOIR: Brand: CARDINAL HEALTH

## (undated) DEVICE — ELECTRODE BLADE MOD E-Z CLEAN 2.5IN 6.4CM -0012M